# Patient Record
Sex: MALE | Race: OTHER | NOT HISPANIC OR LATINO | URBAN - METROPOLITAN AREA
[De-identification: names, ages, dates, MRNs, and addresses within clinical notes are randomized per-mention and may not be internally consistent; named-entity substitution may affect disease eponyms.]

---

## 2019-08-10 ENCOUNTER — INPATIENT (INPATIENT)
Facility: HOSPITAL | Age: 54
LOS: 10 days | Discharge: HOME | DRG: 247 | End: 2019-08-20
Attending: HOSPITALIST | Admitting: INTERNAL MEDICINE
Payer: MEDICAID

## 2019-08-10 VITALS
SYSTOLIC BLOOD PRESSURE: 154 MMHG | TEMPERATURE: 99 F | RESPIRATION RATE: 20 BRPM | OXYGEN SATURATION: 97 % | WEIGHT: 171.96 LBS | HEART RATE: 94 BPM | DIASTOLIC BLOOD PRESSURE: 104 MMHG

## 2019-08-10 DIAGNOSIS — I21.4 NON-ST ELEVATION (NSTEMI) MYOCARDIAL INFARCTION: ICD-10-CM
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
## 2019-08-10 LAB
ALBUMIN SERPL ELPH-MCNC: 3.5 G/DL — SIGNIFICANT CHANGE UP (ref 3.3–5)
ALBUMIN SERPL ELPH-MCNC: 3.6 G/DL — SIGNIFICANT CHANGE UP (ref 3.3–5)
ALBUMIN SERPL ELPH-MCNC: 3.7 G/DL — SIGNIFICANT CHANGE UP (ref 3.3–5)
ALP SERPL-CCNC: 55 U/L — SIGNIFICANT CHANGE UP (ref 40–120)
ALP SERPL-CCNC: 57 U/L — SIGNIFICANT CHANGE UP (ref 40–120)
ALP SERPL-CCNC: 59 U/L — SIGNIFICANT CHANGE UP (ref 40–120)
ALT FLD-CCNC: 182 U/L — HIGH (ref 10–45)
ALT FLD-CCNC: 87 U/L — HIGH (ref 10–45)
ALT FLD-CCNC: 98 U/L — HIGH (ref 10–45)
ANION GAP SERPL CALC-SCNC: 14 MMOL/L — SIGNIFICANT CHANGE UP (ref 5–17)
ANION GAP SERPL CALC-SCNC: 14 MMOL/L — SIGNIFICANT CHANGE UP (ref 5–17)
ANION GAP SERPL CALC-SCNC: 15 MMOL/L — SIGNIFICANT CHANGE UP (ref 5–17)
APTT BLD: 153.8 SEC — CRITICAL HIGH (ref 27.5–36.3)
AST SERPL-CCNC: 109 U/L — HIGH (ref 10–40)
AST SERPL-CCNC: 271 U/L — HIGH (ref 10–40)
AST SERPL-CCNC: 73 U/L — HIGH (ref 10–40)
BASOPHILS # BLD AUTO: 0 K/UL — SIGNIFICANT CHANGE UP (ref 0–0.2)
BILIRUB SERPL-MCNC: 0.2 MG/DL — SIGNIFICANT CHANGE UP (ref 0.2–1.2)
BILIRUB SERPL-MCNC: 0.3 MG/DL — SIGNIFICANT CHANGE UP (ref 0.2–1.2)
BILIRUB SERPL-MCNC: 0.3 MG/DL — SIGNIFICANT CHANGE UP (ref 0.2–1.2)
BLD GP AB SCN SERPL QL: NEGATIVE — SIGNIFICANT CHANGE UP
BUN SERPL-MCNC: 16 MG/DL — SIGNIFICANT CHANGE UP (ref 7–23)
BUN SERPL-MCNC: 17 MG/DL — SIGNIFICANT CHANGE UP (ref 7–23)
BUN SERPL-MCNC: 17 MG/DL — SIGNIFICANT CHANGE UP (ref 7–23)
CALCIUM SERPL-MCNC: 10 MG/DL — SIGNIFICANT CHANGE UP (ref 8.4–10.5)
CALCIUM SERPL-MCNC: 8.7 MG/DL — SIGNIFICANT CHANGE UP (ref 8.4–10.5)
CALCIUM SERPL-MCNC: 9.5 MG/DL — SIGNIFICANT CHANGE UP (ref 8.4–10.5)
CHLORIDE SERPL-SCNC: 100 MMOL/L — SIGNIFICANT CHANGE UP (ref 96–108)
CHLORIDE SERPL-SCNC: 102 MMOL/L — SIGNIFICANT CHANGE UP (ref 96–108)
CHLORIDE SERPL-SCNC: 103 MMOL/L — SIGNIFICANT CHANGE UP (ref 96–108)
CHOLEST SERPL-MCNC: 175 MG/DL — SIGNIFICANT CHANGE UP (ref 10–199)
CK MB BLD-MCNC: 10.1 % — HIGH (ref 0–3.5)
CK MB BLD-MCNC: 8.2 % — HIGH (ref 0–3.5)
CK MB CFR SERPL CALC: 14.1 NG/ML — HIGH (ref 0–6.7)
CK MB CFR SERPL CALC: 18.5 NG/ML — HIGH (ref 0–6.7)
CK SERPL-CCNC: 171 U/L — SIGNIFICANT CHANGE UP (ref 30–200)
CK SERPL-CCNC: 183 U/L — SIGNIFICANT CHANGE UP (ref 30–200)
CO2 SERPL-SCNC: 21 MMOL/L — LOW (ref 22–31)
CREAT SERPL-MCNC: 0.88 MG/DL — SIGNIFICANT CHANGE UP (ref 0.5–1.3)
CREAT SERPL-MCNC: 0.88 MG/DL — SIGNIFICANT CHANGE UP (ref 0.5–1.3)
CREAT SERPL-MCNC: 0.89 MG/DL — SIGNIFICANT CHANGE UP (ref 0.5–1.3)
EOSINOPHIL # BLD AUTO: 1.1 K/UL — HIGH (ref 0–0.5)
EOSINOPHIL NFR BLD AUTO: 7 % — HIGH (ref 0–6)
GLUCOSE SERPL-MCNC: 108 MG/DL — HIGH (ref 70–99)
GLUCOSE SERPL-MCNC: 112 MG/DL — HIGH (ref 70–99)
GLUCOSE SERPL-MCNC: 87 MG/DL — SIGNIFICANT CHANGE UP (ref 70–99)
HBA1C BLD-MCNC: 5.6 % — SIGNIFICANT CHANGE UP (ref 4–5.6)
HCT VFR BLD CALC: 36.5 % — LOW (ref 39–50)
HCT VFR BLD CALC: 38 % — LOW (ref 39–50)
HCT VFR BLD CALC: 38.6 % — LOW (ref 39–50)
HDLC SERPL-MCNC: 50 MG/DL — SIGNIFICANT CHANGE UP
HGB BLD-MCNC: 12.1 G/DL — LOW (ref 13–17)
HGB BLD-MCNC: 12.6 G/DL — LOW (ref 13–17)
HGB BLD-MCNC: 12.7 G/DL — LOW (ref 13–17)
INR BLD: 1.28 RATIO — HIGH (ref 0.88–1.16)
LIPID PNL WITH DIRECT LDL SERPL: 107 MG/DL — HIGH
LYMPHOCYTES # BLD AUTO: 10 % — LOW (ref 13–44)
LYMPHOCYTES # BLD AUTO: 2.6 K/UL — SIGNIFICANT CHANGE UP (ref 1–3.3)
MAGNESIUM SERPL-MCNC: 1.9 MG/DL — SIGNIFICANT CHANGE UP (ref 1.6–2.6)
MAGNESIUM SERPL-MCNC: 2.3 MG/DL — SIGNIFICANT CHANGE UP (ref 1.6–2.6)
MCHC RBC-ENTMCNC: 29.6 PG — SIGNIFICANT CHANGE UP (ref 27–34)
MCHC RBC-ENTMCNC: 30 PG — SIGNIFICANT CHANGE UP (ref 27–34)
MCHC RBC-ENTMCNC: 30.3 PG — SIGNIFICANT CHANGE UP (ref 27–34)
MCHC RBC-ENTMCNC: 32.5 GM/DL — SIGNIFICANT CHANGE UP (ref 32–36)
MCHC RBC-ENTMCNC: 33 GM/DL — SIGNIFICANT CHANGE UP (ref 32–36)
MCHC RBC-ENTMCNC: 33.3 GM/DL — SIGNIFICANT CHANGE UP (ref 32–36)
MCV RBC AUTO: 91 FL — SIGNIFICANT CHANGE UP (ref 80–100)
MCV RBC AUTO: 91 FL — SIGNIFICANT CHANGE UP (ref 80–100)
MCV RBC AUTO: 91.2 FL — SIGNIFICANT CHANGE UP (ref 80–100)
MONOCYTES # BLD AUTO: 2.4 K/UL — HIGH (ref 0–0.9)
MONOCYTES NFR BLD AUTO: 16 % — HIGH (ref 2–14)
NEUTROPHILS # BLD AUTO: 12.2 K/UL — HIGH (ref 1.8–7.4)
NEUTROPHILS NFR BLD AUTO: 67 % — SIGNIFICANT CHANGE UP (ref 43–77)
PHOSPHATE SERPL-MCNC: 3.3 MG/DL — SIGNIFICANT CHANGE UP (ref 2.5–4.5)
PLATELET # BLD AUTO: 247 K/UL — SIGNIFICANT CHANGE UP (ref 150–400)
PLATELET # BLD AUTO: 264 K/UL — SIGNIFICANT CHANGE UP (ref 150–400)
PLATELET # BLD AUTO: 271 K/UL — SIGNIFICANT CHANGE UP (ref 150–400)
POTASSIUM SERPL-MCNC: 3.7 MMOL/L — SIGNIFICANT CHANGE UP (ref 3.5–5.3)
POTASSIUM SERPL-MCNC: 3.8 MMOL/L — SIGNIFICANT CHANGE UP (ref 3.5–5.3)
POTASSIUM SERPL-MCNC: 4.5 MMOL/L — SIGNIFICANT CHANGE UP (ref 3.5–5.3)
POTASSIUM SERPL-SCNC: 3.7 MMOL/L — SIGNIFICANT CHANGE UP (ref 3.5–5.3)
POTASSIUM SERPL-SCNC: 3.8 MMOL/L — SIGNIFICANT CHANGE UP (ref 3.5–5.3)
POTASSIUM SERPL-SCNC: 4.5 MMOL/L — SIGNIFICANT CHANGE UP (ref 3.5–5.3)
PROT SERPL-MCNC: 6.5 G/DL — SIGNIFICANT CHANGE UP (ref 6–8.3)
PROT SERPL-MCNC: 6.6 G/DL — SIGNIFICANT CHANGE UP (ref 6–8.3)
PROT SERPL-MCNC: 6.9 G/DL — SIGNIFICANT CHANGE UP (ref 6–8.3)
PROTHROM AB SERPL-ACNC: 14.7 SEC — HIGH (ref 10–12.9)
RBC # BLD: 4.01 M/UL — LOW (ref 4.2–5.8)
RBC # BLD: 4.18 M/UL — LOW (ref 4.2–5.8)
RBC # BLD: 4.24 M/UL — SIGNIFICANT CHANGE UP (ref 4.2–5.8)
RBC # FLD: 12.1 % — SIGNIFICANT CHANGE UP (ref 10.3–14.5)
RBC # FLD: 12.2 % — SIGNIFICANT CHANGE UP (ref 10.3–14.5)
RBC # FLD: 12.3 % — SIGNIFICANT CHANGE UP (ref 10.3–14.5)
RH IG SCN BLD-IMP: POSITIVE — SIGNIFICANT CHANGE UP
SODIUM SERPL-SCNC: 135 MMOL/L — SIGNIFICANT CHANGE UP (ref 135–145)
SODIUM SERPL-SCNC: 138 MMOL/L — SIGNIFICANT CHANGE UP (ref 135–145)
SODIUM SERPL-SCNC: 138 MMOL/L — SIGNIFICANT CHANGE UP (ref 135–145)
TOTAL CHOLESTEROL/HDL RATIO MEASUREMENT: 3.5 RATIO — SIGNIFICANT CHANGE UP (ref 3.4–9.6)
TRIGL SERPL-MCNC: 88 MG/DL — SIGNIFICANT CHANGE UP (ref 10–149)
TROPONIN T, HIGH SENSITIVITY RESULT: 1176 NG/L — HIGH (ref 0–51)
TROPONIN T, HIGH SENSITIVITY RESULT: 506 NG/L — HIGH (ref 0–51)
TROPONIN T, HIGH SENSITIVITY RESULT: 756 NG/L — HIGH (ref 0–51)
TSH SERPL-MCNC: 5.66 UIU/ML — HIGH (ref 0.27–4.2)
WBC # BLD: 14.3 K/UL — HIGH (ref 3.8–10.5)
WBC # BLD: 14.7 K/UL — HIGH (ref 3.8–10.5)
WBC # BLD: 18.3 K/UL — HIGH (ref 3.8–10.5)
WBC # FLD AUTO: 14.3 K/UL — HIGH (ref 3.8–10.5)
WBC # FLD AUTO: 14.7 K/UL — HIGH (ref 3.8–10.5)
WBC # FLD AUTO: 18.3 K/UL — HIGH (ref 3.8–10.5)

## 2019-08-10 PROCEDURE — 99152 MOD SED SAME PHYS/QHP 5/>YRS: CPT

## 2019-08-10 PROCEDURE — 99291 CRITICAL CARE FIRST HOUR: CPT

## 2019-08-10 PROCEDURE — 93306 TTE W/DOPPLER COMPLETE: CPT | Mod: 26

## 2019-08-10 PROCEDURE — 92941 PRQ TRLML REVSC TOT OCCL AMI: CPT | Mod: LD

## 2019-08-10 PROCEDURE — 93458 L HRT ARTERY/VENTRICLE ANGIO: CPT | Mod: 26,59

## 2019-08-10 PROCEDURE — 93010 ELECTROCARDIOGRAM REPORT: CPT

## 2019-08-10 PROCEDURE — 93010 ELECTROCARDIOGRAM REPORT: CPT | Mod: 76

## 2019-08-10 RX ORDER — HYDRALAZINE HCL 50 MG
10 TABLET ORAL THREE TIMES A DAY
Refills: 0 | Status: DISCONTINUED | OUTPATIENT
Start: 2019-08-10 | End: 2019-08-10

## 2019-08-10 RX ORDER — MAGNESIUM SULFATE 500 MG/ML
1 VIAL (ML) INJECTION ONCE
Refills: 0 | Status: COMPLETED | OUTPATIENT
Start: 2019-08-10 | End: 2019-08-10

## 2019-08-10 RX ORDER — FENTANYL CITRATE 50 UG/ML
25 INJECTION INTRAVENOUS ONCE
Refills: 0 | Status: DISCONTINUED | OUTPATIENT
Start: 2019-08-10 | End: 2019-08-10

## 2019-08-10 RX ORDER — ATORVASTATIN CALCIUM 80 MG/1
80 TABLET, FILM COATED ORAL AT BEDTIME
Refills: 0 | Status: DISCONTINUED | OUTPATIENT
Start: 2019-08-10 | End: 2019-08-11

## 2019-08-10 RX ORDER — NITROGLYCERIN 6.5 MG
50 CAPSULE, EXTENDED RELEASE ORAL
Qty: 50 | Refills: 0 | Status: DISCONTINUED | OUTPATIENT
Start: 2019-08-10 | End: 2019-08-10

## 2019-08-10 RX ORDER — APIXABAN 2.5 MG/1
5 TABLET, FILM COATED ORAL
Refills: 0 | Status: DISCONTINUED | OUTPATIENT
Start: 2019-08-10 | End: 2019-08-12

## 2019-08-10 RX ORDER — METOPROLOL TARTRATE 50 MG
25 TABLET ORAL
Refills: 0 | Status: DISCONTINUED | OUTPATIENT
Start: 2019-08-10 | End: 2019-08-12

## 2019-08-10 RX ORDER — ACETAMINOPHEN 500 MG
650 TABLET ORAL EVERY 6 HOURS
Refills: 0 | Status: DISCONTINUED | OUTPATIENT
Start: 2019-08-10 | End: 2019-08-20

## 2019-08-10 RX ORDER — CHLORHEXIDINE GLUCONATE 213 G/1000ML
1 SOLUTION TOPICAL AT BEDTIME
Refills: 0 | Status: DISCONTINUED | OUTPATIENT
Start: 2019-08-10 | End: 2019-08-20

## 2019-08-10 RX ORDER — HYDRALAZINE HCL 50 MG
25 TABLET ORAL EVERY 8 HOURS
Refills: 0 | Status: DISCONTINUED | OUTPATIENT
Start: 2019-08-10 | End: 2019-08-12

## 2019-08-10 RX ORDER — ISOSORBIDE DINITRATE 5 MG/1
10 TABLET ORAL THREE TIMES A DAY
Refills: 0 | Status: DISCONTINUED | OUTPATIENT
Start: 2019-08-10 | End: 2019-08-11

## 2019-08-10 RX ORDER — TICAGRELOR 90 MG/1
90 TABLET ORAL EVERY 12 HOURS
Refills: 0 | Status: DISCONTINUED | OUTPATIENT
Start: 2019-08-10 | End: 2019-08-11

## 2019-08-10 RX ORDER — ACETAMINOPHEN 500 MG
650 TABLET ORAL ONCE
Refills: 0 | Status: COMPLETED | OUTPATIENT
Start: 2019-08-10 | End: 2019-08-10

## 2019-08-10 RX ORDER — POTASSIUM CHLORIDE 20 MEQ
40 PACKET (EA) ORAL ONCE
Refills: 0 | Status: COMPLETED | OUTPATIENT
Start: 2019-08-10 | End: 2019-08-10

## 2019-08-10 RX ORDER — ASPIRIN/CALCIUM CARB/MAGNESIUM 324 MG
81 TABLET ORAL DAILY
Refills: 0 | Status: DISCONTINUED | OUTPATIENT
Start: 2019-08-10 | End: 2019-08-20

## 2019-08-10 RX ORDER — HYDRALAZINE HCL 50 MG
10 TABLET ORAL ONCE
Refills: 0 | Status: COMPLETED | OUTPATIENT
Start: 2019-08-10 | End: 2019-08-10

## 2019-08-10 RX ORDER — HEPARIN SODIUM 5000 [USP'U]/ML
INJECTION INTRAVENOUS; SUBCUTANEOUS
Qty: 25000 | Refills: 0 | Status: DISCONTINUED | OUTPATIENT
Start: 2019-08-10 | End: 2019-08-10

## 2019-08-10 RX ORDER — CALAMINE AND ZINC OXIDE AND PHENOL 160; 10 MG/ML; MG/ML
1 LOTION TOPICAL THREE TIMES A DAY
Refills: 0 | Status: DISCONTINUED | OUTPATIENT
Start: 2019-08-10 | End: 2019-08-20

## 2019-08-10 RX ADMIN — HEPARIN SODIUM 950 UNIT(S)/HR: 5000 INJECTION INTRAVENOUS; SUBCUTANEOUS at 01:14

## 2019-08-10 RX ADMIN — Medication 15 MICROGRAM(S)/MIN: at 08:31

## 2019-08-10 RX ADMIN — Medication 1 APPLICATION(S): at 06:29

## 2019-08-10 RX ADMIN — Medication 15 MICROGRAM(S)/MIN: at 06:30

## 2019-08-10 RX ADMIN — Medication 81 MILLIGRAM(S): at 06:22

## 2019-08-10 RX ADMIN — Medication 25 MILLIGRAM(S): at 06:30

## 2019-08-10 RX ADMIN — ISOSORBIDE DINITRATE 10 MILLIGRAM(S): 5 TABLET ORAL at 21:35

## 2019-08-10 RX ADMIN — Medication 650 MILLIGRAM(S): at 06:05

## 2019-08-10 RX ADMIN — Medication 10 MILLIGRAM(S): at 09:30

## 2019-08-10 RX ADMIN — Medication 100 GRAM(S): at 08:31

## 2019-08-10 RX ADMIN — APIXABAN 5 MILLIGRAM(S): 2.5 TABLET, FILM COATED ORAL at 18:42

## 2019-08-10 RX ADMIN — Medication 40 MILLIEQUIVALENT(S): at 08:30

## 2019-08-10 RX ADMIN — Medication 10 MILLIGRAM(S): at 15:00

## 2019-08-10 RX ADMIN — FENTANYL CITRATE 25 MICROGRAM(S): 50 INJECTION INTRAVENOUS at 12:30

## 2019-08-10 RX ADMIN — Medication 15 MICROGRAM(S)/MIN: at 00:31

## 2019-08-10 RX ADMIN — CHLORHEXIDINE GLUCONATE 1 APPLICATION(S): 213 SOLUTION TOPICAL at 21:48

## 2019-08-10 RX ADMIN — Medication 25 MILLIGRAM(S): at 21:35

## 2019-08-10 RX ADMIN — FENTANYL CITRATE 25 MICROGRAM(S): 50 INJECTION INTRAVENOUS at 12:00

## 2019-08-10 RX ADMIN — FENTANYL CITRATE 25 MICROGRAM(S): 50 INJECTION INTRAVENOUS at 12:10

## 2019-08-10 RX ADMIN — TICAGRELOR 90 MILLIGRAM(S): 90 TABLET ORAL at 06:23

## 2019-08-10 RX ADMIN — Medication 650 MILLIGRAM(S): at 07:01

## 2019-08-10 RX ADMIN — ISOSORBIDE DINITRATE 10 MILLIGRAM(S): 5 TABLET ORAL at 13:20

## 2019-08-10 RX ADMIN — Medication 25 MILLIGRAM(S): at 18:42

## 2019-08-10 RX ADMIN — CALAMINE AND ZINC OXIDE AND PHENOL 1 APPLICATION(S): 160; 10 LOTION TOPICAL at 06:34

## 2019-08-10 RX ADMIN — Medication 1 APPLICATION(S): at 09:27

## 2019-08-10 RX ADMIN — ISOSORBIDE DINITRATE 10 MILLIGRAM(S): 5 TABLET ORAL at 08:30

## 2019-08-10 RX ADMIN — Medication 1 APPLICATION(S): at 18:43

## 2019-08-10 RX ADMIN — ATORVASTATIN CALCIUM 80 MILLIGRAM(S): 80 TABLET, FILM COATED ORAL at 21:35

## 2019-08-10 RX ADMIN — TICAGRELOR 90 MILLIGRAM(S): 90 TABLET ORAL at 18:42

## 2019-08-10 NOTE — ED PROVIDER NOTE - OBJECTIVE STATEMENT
54M no PMH presents as transfer from [facility] for NSTEMI 54M no PMH presents as transfer from [facility] for NSTEMI. Pt reports that while gardening today he developed sudden onset chest pain and shortness of breath.  He went to [facility] where EKG and trop were done. EKG with J point elevation in V1 V2 and pt found to have elevated troponin. Pt was given ASA, brillinta, started on heparin gtt and given nitro and transferred to [facility] for further cards eval. Pt reports that he was dx with poison ivy this week and has had a widespread rash. Upon arrival to [facility] pt reports being chest pain free without SOB; complaining of generalized throbbing frontal HA

## 2019-08-10 NOTE — ED PROVIDER NOTE - INTERPRETATION
EKG reviewed for rate, rhythm, axis, intervals and segments, including QRS morphology, P wave appearance T wave appearance, NM interval, and QT interval.  I find the EKG to be unremarkable in all of these regards except as follows: precordial j point elevation
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
## 2019-08-10 NOTE — CONSULT NOTE ADULT - ASSESSMENT
53 yo male, current smoker, has not seen a doctor in many years, presented to [place] with chest pain, noted to have elevated troponin and transferred to [facility].    - Admit to CCU for NSTEMI  - At outside hospital pt was loaded with ASA and Brilinta. c/w Heparin gtt.  - Check TTE  - Start Lipitor 80  - On nitro gtt for chest pain and hypertension  - Check TTE    Crow Chapa MD  Cardiology Fellow  [phone]  All Cardiology service information can be found 24/7 on [email or web address], password: [email or web address] 55 yo male, current smoker, has not seen a doctor in many years, presented to [place] with chest pain, noted to have elevated troponin and transferred to [facility].    - Admit to CCU for NSTEMI  - At outside hospital pt was loaded with ASA and Brilinta. c/w Heparin gtt.  - Check TTE  - Start Lipitor 80  - On nitro gtt for chest pain and hypertension  - At outside hospital had CTA which was negative for dissection. Did show lung nodule and recommended 12 month repeat CT chest.  - Check TTE    Crow Chapa MD  Cardiology Fellow  [phone]  All Cardiology service information can be found 24/7 on [email or web address], password: [email or web address]

## 2019-08-10 NOTE — H&P ADULT - HISTORY OF PRESENT ILLNESS
53 yo male, current smoker, has not seen a doctor in many years, presented to [place] with chest pain, noted to have elevated troponin and transferred to [facility]. Pt reports his symptoms began around noon while eating lunch. He reports 8/10 chest pain/pressure that radiated down his L arm and to his back. He felt short of breath during the episode. He went to the outside hospital where he received sublingual nitro and his symptoms improved. At this time his chest pain is 2-3/10 and he feels much improved. The pt denies any recent chest pain prior to today. No syncope, dyspnea, peripheral edema. The pt was diagnosed with Poison Ivy three days ago and was started yesterday on a Prednisone taper and Bactrim. Pt states he has not seen a doctor "in 30 yrs."    At OSH,  BP 170s/110s. Pt was started on heparin and nitro GTT, received ASA/plavix load, transferred to [facility] for further eval.    ED Course:  /104  HR 94  RR 20 T 98.6F 97%02  In the ED, pt was resumed on heparin and nitro GTT. Transferred to CCU for monitoring of NSTEMI. 55 yo male, current smoker, has not seen a doctor in many years, presented to [place] with chest pain, noted to have elevated troponin and transferred to [facility]. Pt reports his symptoms began around noon while eating lunch. He reports 8/10 chest pain/pressure that radiated down his L arm and to his back. He felt short of breath during the episode. He went to the outside hospital where he received sublingual nitro and his symptoms improved. At this time his chest pain is 2-3/10 and he feels much improved. The pt denies any recent chest pain prior to today. No syncope, dyspnea, peripheral edema.     Of note, pt was recently in the woods by a friends house 3d prior. States he had multiple mosquito bites all over his arm and exposure to bushes, does not know whether they looked like poison ivy but recalls that he was wearing a short-sleeved shirt and first noticed the rash on his b/l forearms. Afterwards, developed rash on his legs. The rash is itchy, although less now compared to before. Pt went to a doctor (?[facility]), where he was told he has poison ivy, was Rx'd a steroid taper (took 50mg x1 yesterday only), hydroxyzine PRN and bactrim. Has also been taking his friend's halobetasol which has helped.    At OSH,  BP 170s/110s. Pt was started on heparin and nitro GTT, received ASA/plavix load, transferred to [facility] for further eval.    ED Course:  /104  HR 94  RR 20 T 98.6F 97%02  In the ED, pt was resumed on heparin and nitro GTT. Transferred to CCU for monitoring of NSTEMI. 55 yo male, current smoker, has not seen a doctor in many years, presented to [place] with chest pain, noted to have elevated troponin and transferred to [facility]. Pt reports his symptoms began around noon while eating lunch. He reports 8/10 chest pain/pressure that radiated down his L arm and to his back. He felt short of breath during the episode. He went to the outside hospital where he received sublingual nitro and his symptoms improved. At this time his chest pain is 2-3/10 and he feels much improved. The pt denies any recent chest pain prior to today. No syncope, dyspnea, peripheral edema.     Of note, pt was recently in the woods/garden by a friends house 3d prior. States he had multiple mosquito bites all over his arm and exposure to bushes, does not know whether they looked like poison ivy but recalls that he was wearing a short-sleeved shirt and first noticed the rash on his b/l forearms. Afterwards, developed rash on his legs. The rash is itchy, although less now compared to before. Pt went to a doctor (?[facility]), where he was told he has poison ivy, was Rx'd a steroid taper (took 50mg x1 yesterday only), hydroxyzine PRN and bactrim. Has also been taking his friend's halobetasol which has helped.    At OSH,  /144. Pt was started on heparin and nitro GTT, received ASA/plavix load, transferred to [facility] for further eval. probnp 816. Trop I 1.420.    ED Course:  /104  HR 94  RR 20 T 98.6F 97%02  In the ED, pt was resumed on heparin and nitro GTT. Transferred to CCU for monitoring of NSTEMI.   Currently denies CP, SOB or palpitations. Has some headaches after starting nitro. 54M tobacco user w/ no known PMH (has not seen a doctor in many years) who presented to [place] with chest pain, noted to have elevated troponin and transferred to [facility]. Pt reports his symptoms began around noon while eating lunch. He reports 8/10 chest pain/pressure that radiated down his L arm and to his back. He felt short of breath during the episode. He went to the outside hospital where he received sublingual nitro and his symptoms improved. At this time his chest pain is 2-3/10 and he feels much improved. The pt denies any recent chest pain prior to today. No syncope, dyspnea, peripheral edema.     Of note, pt was recently in the woods/garden by a friends house 3d prior. States he had multiple mosquito bites all over his arm and exposure to bushes, does not know whether they looked like poison ivy but recalls that he was wearing a short-sleeved shirt and first noticed the rash on his b/l forearms. Afterwards, developed rash on his legs. The rash is itchy, although less now compared to before. Pt went to a doctor (?[facility]), where he was told he has poison ivy, was Rx'd a steroid taper (took 50mg x1 yesterday only), hydroxyzine PRN and bactrim. Has also been taking his friend's halobetasol which has helped.    At OSH,  /144. Pt was started on heparin and nitro GTT, received ASA/brilinta load, transferred to [facility] for further eval. probnp 816. Trop I 1.420.    ED Course:  /104  HR 94  RR 20 T 98.6F 97%02  In the ED, pt was resumed on heparin and nitro GTT. Transferred to CCU for monitoring of NSTEMI.   Currently denies CP, SOB or palpitations. Has some headaches after starting nitro.

## 2019-08-10 NOTE — H&P ADULT - NSHPSOCIALHISTORY_GEN_ALL_CORE
current smoker of 30 pack yrs -works as a ''  -drinks heavily per friends (about 3-4 100-150mL shots per day), has never had withdrawal symptoms, has gone 5d w/o drinking w/o symptoms  -family is in [place], only contacts are his close friends  -rents an apartment from someone. does not recall bed bugs at home  -uses tobacco but not cigarettes or chewing tobacco (per friend, keeps the tobacco under his tongue)  -denies rec drug use

## 2019-08-10 NOTE — PATIENT PROFILE ADULT - INTERPRETATION SERVICES DECLINED
Pt can make needs met in English Patient/Caregiver requests family/friend to interpret./Pt can make needs met in English

## 2019-08-10 NOTE — PROGRESS NOTE ADULT - SUBJECTIVE AND OBJECTIVE BOX
====================  CCU MIDNIGHT ROUNDS  ====================    CAMILLA STILL  [id number]  Patient is a 54y old  Male who presents with a chief complaint of NSTEMI (10 Aug 2019 02:09)      ====================  SUMMARY: 54M Bulgarian speaking current smoker, no known PMH 2/2 not seeing a doctor for many yrs who presented to [place] w/ chest +back pain, elevated BPs and elevated troponin, transferred to [facility] for mgmt of HTN emergency w/ NSTEMI, now s/p LHC 8/10 showing 100%occ to AD s/p RADHA x1.  ====================      ====================  NEW EVENTS: Hydralazine dose increased to 25mg TID from 10mg due to elevated BP.   ====================    MEDICATIONS  (STANDING):  apixaban 5 milliGRAM(s) Oral two times a day  aspirin  chewable 81 milliGRAM(s) Oral daily  atorvastatin 80 milliGRAM(s) Oral at bedtime  chlorhexidine 2% Cloths 1 Application(s) Topical at bedtime  clobetasol 0.05% Ointment 1 Application(s) Topical two times a day  hydrALAZINE 25 milliGRAM(s) Oral every 8 hours  isosorbide   dinitrate Tablet (ISORDIL) 10 milliGRAM(s) Oral three times a day  metoprolol tartrate 25 milliGRAM(s) Oral two times a day  ticagrelor 90 milliGRAM(s) Oral every 12 hours    MEDICATIONS  (PRN):  acetaminophen   Tablet .. 650 milliGRAM(s) Oral every 6 hours PRN Temp greater or equal to 38C (100.4F), Mild Pain (1 - 3), Moderate Pain (4 - 6), Severe Pain (7 - 10)  calamine Lotion 1 Application(s) Topical three times a day PRN Itching      ====================  VITALS (Last 12 hrs):  ====================    T(C): --  T(F): --  HR: 70 (08-10-19 @ 21:00) (66 - 94)  BP: 146/92 (08-10-19 @ 21:00) (118/75 - 164/106)  BP(mean): 119 (08-10-19 @ 21:00) (94 - 136)  ABP: 74/98 (08-10-19 @ 12:00) (74/98 - 148/94)  ABP(mean): 40 (08-10-19 @ 12:00) (40 - 112)  RR: 22 (08-10-19 @ 21:00) (17 - 36)  SpO2: 99% (08-10-19 @ 20:44) (97% - 99%)    I&O's Summary    09 Aug 2019 07:01  -  10 Aug 2019 07:00  --------------------------------------------------------  IN: 217.5 mL / OUT: 700 mL / NET: -482.5 mL    10 Aug 2019 07:01  -  10 Aug 2019 21:56  --------------------------------------------------------  IN: 425 mL / OUT: 2175 mL / NET: -1750 mL      ====================  NEW LABS:  ====================      08-10    135  |  100  |  17  ----------------------------<  87  4.5   |  21<L>  |  0.88    Ca    8.7      10 Aug 2019 12:45  Phos  3.3     08-10  Mg     2.3     08-10    TPro  6.5  /  Alb  3.6  /  TBili  0.3  /  DBili  x   /  AST  271<H>  /  ALT  182<H>  /  AlkPhos  57  08-10    PT/INR - ( 10 Aug 2019 06:20 )   PT: 14.7 sec;   INR: 1.28 ratio         PTT - ( 10 Aug 2019 06:20 )  PTT:153.8 sec  Creatine Kinase, Serum: 171 U/L (08-10-19 @ 12:45)  Creatine Kinase, Serum: 183 U/L (08-10-19 @ 06:20)  Creatine Kinase, Serum: 208 U/L <H> (08-10-19 @ 00:50)    CKMB Units: 14.1 ng/mL (08-10 @ 12:45)  CKMB Units: 18.5 ng/mL (08-10 @ 06:20)  CKMB Units: 18.6 ng/mL (08-10 @ 00:50)      ====================  PLAN:  ====================  #Cardiac  NSTEMI s/p LHC RADHA x1 mLAD  -c/w DAPT and high dose statin  -c/w lopressor 25mg BID  -increased hydralazine to 25mg TID  -c/w isosorbide 10mg TID  -Planned to transition to ACE/ARB tomorrow  -CE peaked    LV thrombus  -TTE 8/10 shows apical thrombus  -started eliquis 5mg BID    #DERM  Poison Ivy rash  -c/w calamine lotion for itchiness  -c/w temovate oinment  -f/u derm recs    Maria Esther Aggarwal CCU NP  Beeper #[id number]  Spectra # [phone]

## 2019-08-10 NOTE — ED PROVIDER NOTE - CLINICAL SUMMARY MEDICAL DECISION MAKING FREE TEXT BOX
see attending attestation authored by me, Bill Rivas MD see attending attestation authored by me, MD Salina Pang PGY4: 54M arrives as transfer for NSTEMI; no evidence of STEMI on EKG; s/p asa and brillinta; on hep gtt; will check trop, cxr, and admit per cardiology

## 2019-08-10 NOTE — ED PROVIDER NOTE - ATTENDING CONTRIBUTION TO CARE
55 yo male transfer from OSH for CP, reportedly elevated trop, dx with NSTEMI, started on heparin and nitro GTT, transferred here for further eval.  EKG here without STEMI, cards fellow at the bedside.  BP 170s/110s.  draw labs, resume heparin and nitro GTT, dispo per cardiology.  already got ASA and plavix load at OSH.

## 2019-08-10 NOTE — H&P ADULT - ASSESSMENT
54M German speaking current smoker, no known PMH 2/2 not seeing a doctor for many yrs who presented to [place] w/ chest +back pain, elevated BPs and elevated troponin, transferred to [facility] for mgmt of NSTEMI.      #Neuro  -mentating at baseline, A0 x 4    #Pulm  - satting well on RA  - CTA Chest at OSH unremarkable for PE but did show lung nodule  - pt will need 12 mth repeat CT chest as oupt    #CV  NSTEMI  Linn score:      ANTWAN score:  -EKG shows  -Found to have elevated troponins to 506, continue to trend  -CTA chest at OSH neg for acute dissection  -TTE in AM  -s/p asa, brilinta load at OSH  -c/w asa 81mg, brilinta 90 mg qd  -c/w hep gtt and nitro gtt  -monitor BPs  -start BB and ACE/ARB when tolerated    Hypertensive Emergency  -presented to OSH w/ BP 170s/110s w/ concurrent ACS  -now on nitro gtt  -monitor BPs; goal <160/110 over 24hrs    #GI  -Diet: DASH  -found to have transaminitis   -possibly 2/2 acute HTN emergency    #Derm  -pt w/ diffuse pruritic rash, c/w poison ivy  -will administer calamine lotion for symptom control    #Heme  -Hgb stable, no signs of acute bleed    #ID:  -leukocytosis to 18.3, afebrile. suspect 2/2 recent prednisone use.  -will continue to monitor     #DVT ppx:  -on hep gtt for NSTEMI      Franklyn Dickson  PGY2, Internal Medicine  CCU  pager [phone] 54M [place] speaking current smoker, no known PMH 2/2 not seeing a doctor for many yrs who presented to [place] w/ chest +back pain, elevated BPs and elevated troponin, transferred to [facility] for mgmt of NSTEMI.      #Neuro  -mentating at baseline, A0 x 4    #Pulm  - satting well on RA  - CTA Chest at OSH unremarkable for PE but did show lung nodule  - pt will need 12 mth repeat CT chest as oupt    #CV  NSTEMI  Linn score:      ANTWAN score:  -EKG shows  -Found to have elevated troponins to 506, continue to trend  -CTA chest at OSH neg for acute dissection  -TTE in AM  -s/p asa, brilinta load at OSH  -c/w asa 81mg, brilinta 90 mg qd  -start atorvastatin 80mg qhs  -c/w hep gtt and nitro gtt  -monitor BPs  -start BB and ACE/ARB when tolerated    Hypertensive Emergency  -presented to OSH w/ BP 170s/110s w/ concurrent ACS  -now on nitro gtt  -monitor BPs; goal <160/110 over 24hrs    #GI  -Diet: DASH  -found to have transaminitis   -possibly 2/2 acute HTN emergency    #Derm  -pt w/ diffuse pruritic rash, c/w poison ivy  -will administer calamine lotion for symptom control    #Heme  -Hgb stable, no signs of acute bleed    #ID:  -leukocytosis to 18.3, afebrile. suspect 2/2 recent prednisone use.  -will continue to monitor     #DVT ppx:  -on hep gtt for NSTEMI      Franklyn Dickson  PGY2, Internal Medicine  CCU  pager [phone] 54M [place] speaking current smoker, no known PMH 2/2 not seeing a doctor for many yrs who presented to [place] w/ chest +back pain, elevated BPs and elevated troponin, transferred to [facility] for mgmt of HTN emergency w/ NSTEMI.       #Neuro  -mentating at baseline, A0 x 4    #Pulm  - satting well on RA  - CTA Chest at OSH unremarkable for PE but did show lung nodule  - pt will need 12 mth repeat CT chest as oupt for f/u nodule    #CV  NSTEMI  Linn score:      ANTWAN score:  -Found to have elevated troponins to 506, continue to trend  -CTA chest at OSH neg for acute dissection  -TTE in AM  -s/p asa, brilinta load at OSH  -c/w asa 81mg, brilinta 90 mg qd  -start atorvastatin 80mg qhs  -c/w hep gtt and nitro gtt  -monitor BPs  -start BB and ACE/ARB when tolerated    Hypertensive Emergency  -presented to OSH w/ BP 170s/110s w/ concurrent ACS  -now on nitro gtt  -monitor BPs; goal <160/110 over 24hrs    #GI  -Diet: DASH  -found to have transaminitis   -possibly elevated in the setting of NSTEMI/HTN emergency  -will continue to trend  -no abd pain    #Derm  -pt w/ diffuse pruritic rash, c/w poison ivy  -will administer calamine lotion for symptom control  -consider topic corticosteroids if necessary, derm consult if unresolving    #Heme  -Hgb stable, no signs of acute bleed    #ID:  -leukocytosis to 18.3, afebrile. suspect 2/2 recent prednisone use.  -will continue to monitor     #DVT ppx:  -on hep gtt for NSTEMI      Franklyn Dickson  PGY2, Internal Medicine  CCU  pager [phone] 54M [place] speaking current smoker, no known PMH 2/2 not seeing a doctor for many yrs who presented to [place] w/ chest +back pain, elevated BPs and elevated troponin, transferred to [facility] for mgmt of HTN emergency w/ NSTEMI.       #Neuro  -mentating at baseline, A0 x 4    #Pulm  - satting well on RA  - CTA Chest at OSH unremarkable for PE but did show lung nodule  - pt will need 12 mth repeat CT chest as oupt for f/u nodule    #CV  NSTEMI  Linn score: 74 (1.8% prob of death < 6mths)  ANTWAN score: 2 (8% risk in 14d)  -Found to have elevated troponins to 506, continue to trend  -CTA chest at OSH neg for acute dissection  -TTE in AM  -s/p asa, brilinta load at OSH  -c/w asa 81mg, brilinta 90 mg qd  -start atorvastatin 80mg qhs  -c/w hep gtt and nitro gtt  -monitor BPs  -start BB and ACE/ARB when tolerated  -will need LHC at some pt, currently HD stable w/ controlled symptoms    Hypertensive Emergency  -presented to OSH w/ BP 170s/110s w/ concurrent ACS  -now on nitro gtt  -monitor BPs; goal <160/110 over 24hrs    #GI  -Diet: DASH  -found to have transaminitis   -possibly elevated in the setting of NSTEMI/HTN emergency  -will continue to trend  -no abd pain    #Derm  -pt w/ diffuse pruritic rash, c/w poison ivy  -will administer calamine lotion + topical corticosteroids for symptom control  -derm consult if unresolving    #Heme  -Hgb stable, no signs of acute bleed    #Endo  -HbA1c and lipid panel in AM  -no active issues    #ID:  -leukocytosis to 18.3, afebrile. suspect 2/2 recent prednisone use.  -will continue to monitor     #DVT ppx:  -on hep gtt for NSTEMI      Franklyn Dickson  PGY2, Internal Medicine  CCU  pager [phone] 54M Zimbabwean speaking current smoker, no known PMH 2/2 not seeing a doctor for many yrs who presented to [place] w/ chest +back pain, elevated BPs and elevated troponin, transferred to [facility] for mgmt of HTN emergency w/ NSTEMI.       #Neuro  -mentating at baseline, A0 x 4    #Pulm  - satting well on RA  - CTA Chest at OSH unremarkable for PE but did show lung nodule  - pt will need 12 mth repeat CT chest as oupt for f/u nodule    #CV  NSTEMI  Linn score: 74 (1.8% prob of death < 6mths)  ANTWAN score: 2 (8% risk in 14d)  -Found to have elevated troponins to 506, continue to trend  -CTA chest at OSH neg for acute dissection  -TTE in AM  -s/p asa, brilinta load at OSH  -c/w asa 81mg, brilinta 90 mg qd  -start atorvastatin 80mg qhs  -c/w hep gtt and nitro gtt  -monitor BPs  -start BB and ACE/ARB when tolerated  -will need [facility] (tentatively this wknd), currently HD stable w/ controlled symptoms    Hypertensive Emergency  -presented to OSH w/ BP 170s/110s w/ concurrent ACS  -now on nitro gtt  -monitor BPs; goal <160/110 over 24hrs from presentation    #GI  -Diet: DASH  -found to have transaminitis   -possibly elevated in the setting of NSTEMI/HTN emergency  -will continue to trend  -no abd pain    #Derm  -pt w/ diffuse pruritic weeping rash in the s/o of recent outing in the woods, c/w poison ivy. interdigital vesicles noted, though given history, lower susp for scabies justin  -will administer calamine lotion + topical corticosteroids for symptom control  -will hold off on bactrim as arm does not appear to be superinfected justin  -derm consult in AM for assistance with diagnosis and treatment    #Heme  -Hgb stable, no signs of acute bleed    #Endo  -HbA1c and lipid panel in AM  -no active issues    #ID:  -leukocytosis to 18.3, afebrile. suspect 2/2 recent prednisone use, lower susp for systemic infection  -blood cx's if febrile  -will continue to monitor     #DVT ppx:  -on hep gtt for NSTEMI      Franklyn Dickson  PGY2, Internal Medicine  CCU  pager [phone] 54M Montenegrin speaking current smoker, no known PMH 2/2 not seeing a doctor for many yrs who presented to [place] w/ chest +back pain, elevated BPs and elevated troponin, transferred to [facility] for mgmt of HTN emergency w/ NSTEMI.       #Neuro  -mentating at baseline, A0 x 4    #Pulm  - satting well on RA  - CTA Chest at OSH unremarkable for PE but did show lung nodule  - pt will need 12 mth repeat CT chest as oupt for f/u nodule    #CV  NSTEMI  Linn score: 74 (1.8% prob of death < 6mths)  ANTWAN score: 2 (8% risk in 14d)  -Found to have elevated troponins to 506, continue to trend  -CTA chest at OSH neg for acute dissection  -TTE in AM  -s/p asa, brilinta load at OSH  -c/w asa 81mg, brilinta 90 mg qd  -start atorvastatin 80mg qhs  -c/w hep gtt and nitro gtt  -monitor BPs  -start BB and ACE/ARB when tolerated  -will need [facility] (tentatively this wknd), currently HD stable w/ controlled symptoms    Hypertensive Emergency  -presented to OSH w/ BP 170s/110s w/ concurrent ACS  -now on nitro gtt  -monitor BPs; goal <160/110 over 24hrs from presentation    #GI  -Diet: DASH  -found to have transaminitis   -possibly elevated in the setting of NSTEMI/HTN emergency  -will continue to trend  -no abd pain    #Derm  -pt w/ diffuse pruritic weeping rash in the s/o of recent outing in the woods, c/w poison ivy. interdigital vesicles noted, though given history, lower susp for scabies justin  -will administer calamine lotion + topical corticosteroids for symptom control  -will hold off on bactrim as arm does not appear to be superinfected justin  -derm consult in AM for assistance with diagnosis and treatment    #Heme  -Hgb stable, no signs of acute bleed    #Endo  -HbA1c and lipid panel in AM  -no active issues    #Renal  -Cr 0.89  -continue to monitor  -replete lytes as necessary    #ID:  -leukocytosis to 18.3, afebrile. suspect 2/2 recent prednisone use, lower susp for systemic infection  -blood cx's if febrile  -will continue to monitor     #DVT ppx:  -on hep gtt for NSTEMI      Franklyn Dickson  PGY2, Internal Medicine  CCU  pager [phone] 54M Ukrainian speaking current smoker, no known PMH 2/2 not seeing a doctor for many yrs who presented to [place] w/ chest +back pain, elevated BPs and elevated troponin, transferred to [facility] for mgmt of HTN emergency w/ NSTEMI.       #Neuro  -mentating at baseline, A0 x 4    #Pulm  - satting well on RA  - CTA Chest at OSH unremarkable for PE but did show lung nodule  - pt will need 12 mth repeat CT chest as oupt for f/u nodule    #CV  NSTEMI  Linn score: 74 (1.8% prob of death < 6mths)  ANTWAN score: 2 (8% risk in 14d)  -now s/p LHC on 8/10 w/ 1 RADHA to pLAD via R fem  -currently HDS  -Found to have elevated troponins to 506, continue to trend  -CTA chest at OSH neg for acute dissection  -TTE in AM  -s/p asa, brilinta load at OSH  -c/w asa 81mg, brilinta 90 mg qd  -start atorvastatin 80mg qhs  -c/w hep gtt and nitro gtt  -monitor BPs  -start BB and ACE/ARB when tolerated    Hypertensive Emergency  -presented to OSH w/ BP 170s/110s w/ concurrent ACS  -now on nitro gtt  -monitor BPs; goal <160/110 over 24hrs from presentation    #GI  -Diet: DASH  -found to have transaminitis   -possibly elevated in the setting of NSTEMI/HTN emergency  -will continue to trend  -no abd pain    #Derm  -pt w/ diffuse pruritic weeping rash in the s/o of recent outing in the woods, c/w poison ivy. interdigital vesicles noted, though given history, lower susp for scabies justin  -will administer calamine lotion + topical corticosteroids for symptom control  -will hold off on bactrim as arm does not appear to be superinfected justin  -derm consult in AM for assistance with diagnosis and treatment    #Heme  -Hgb stable, no signs of acute bleed    #Endo  -HbA1c and lipid panel in AM  -no active issues    #Renal  -Cr 0.89  -continue to monitor  -replete lytes as necessary    #ID:  -leukocytosis to 18.3, afebrile. suspect 2/2 recent prednisone use, lower susp for systemic infection  -blood cx's if febrile  -will continue to monitor     #DVT ppx:  -on hep gtt for NSTEMI      Franklyn Claytonjordandelfin  PGY2, Internal Medicine  CCU  pager [phone] 54M Filipino speaking current smoker, no known PMH 2/2 not seeing a doctor for many yrs who presented to [place] w/ chest +back pain, elevated BPs and elevated troponin, transferred to [facility] for mgmt of HTN emergency w/ NSTEMI.       #Neuro  -mentating at baseline, A0 x 4    #Pulm  - satting well on RA  - CTA Chest at OSH unremarkable for PE but did show lung nodule  - pt will need 12 mth repeat CT chest as oupt for f/u nodule    #CV  NSTEMI  Linn score: 74 (1.8% prob of death < 6mths)  ANTWAN score: 2 (8% risk in 14d)  --now s/p LHC on 8/10 w/ 1 RADHA to pLAD via R fem  -currently HDS  -found to have elevated troponins to 506, continue to trend  -CTA chest at OSH neg for acute dissection  -TTE in AM  -s/p asa, brilinta load at OSH  -c/w asa 81mg, brilinta 90 mg qd  -start atorvastatin 80mg qhs  -started on metoprolol 25 BID  -s/p hep gtt  - c/w nitro gtt for HTN emergency as below  -monitor BPs  -start ACE/ARB when tolerated    Hypertensive Emergency  -presented to OSH w/ BP 170s/110s w/ concurrent ACS  -now on nitro gtt  -monitor BPs; goal <160/110 over 24hrs from presentation    #GI  -Diet: DASH  -found to have transaminitis   -possibly elevated in the setting of NSTEMI/HTN emergency  -will continue to trend  -no abd pain    #Derm  -pt w/ diffuse pruritic weeping rash in the s/o of recent outing in the woods, c/w poison ivy. interdigital vesicles noted, though given history, lower susp for scabies justin  -will administer calamine lotion + topical corticosteroids for symptom control  -will hold off on bactrim as arm does not appear to be superinfected justin  -derm consult in AM for assistance with diagnosis and treatment    #Heme  -Hgb stable, no signs of acute bleed    #Endo  -HbA1c and lipid panel in AM  -no active issues    #Renal  -Cr 0.89  -continue to monitor  -replete lytes as necessary    #ID:  -leukocytosis to 18.3, afebrile. suspect 2/2 recent prednisone use, lower susp for systemic infection  -blood cx's if febrile  -will continue to monitor     #DVT ppx:  -on hep gtt for NSTEMI      Franklyn Dickson  PGY2, Internal Medicine  CCU  pager [phone] 54M Maldivian speaking current smoker, no known PMH 2/2 not seeing a doctor for many yrs who presented to [place] w/ chest +back pain, elevated BPs and elevated troponin, transferred to [facility] for mgmt of HTN emergency w/ NSTEMI.       #Neuro  -mentating at baseline, A0 x 4    #Pulm  - satting well on RA  - CTA Chest at OSH unremarkable for PE but did show lung nodule  - pt will need 12 mth repeat CT chest as oupt for f/u nodule    #CV  NSTEMI  Linn score: 74 (1.8% prob of death < 6mths)  ANTWAN score: 2 (8% risk in 14d)  --now s/p LHC on 8/10 w/ 1 RADHA to pLAD via R fem  -currently HDS  -found to have elevated troponins to 506, continue to trend  -CTA chest at OSH neg for acute dissection  -TTE in AM  -s/p asa, brilinta load at OSH  -c/w asa 81mg, brilinta 90 mg qd  -start atorvastatin 80mg qhs  -started on metoprolol 25 BID  -s/p hep gtt  - c/w nitro gtt for HTN emergency as below  -monitor BPs  -start ACE/ARB when tolerated    Hypertensive Emergency  -presented to OSH w/ BP 170s/110s w/ concurrent ACS  -now on nitro gtt  -monitor BPs; goal <160/110 over 24hrs from presentation    #GI  -Diet: DASH  -found to have transaminitis   -possibly elevated in the setting of NSTEMI/HTN emergency  -will continue to trend  -no abd pain    #Derm  -pt w/ diffuse pruritic weeping rash in the s/o of recent outing in the woods, c/w poison ivy. interdigital vesicles noted, though given history, lower susp for scabies justin  -will administer calamine lotion + topical corticosteroids (clobetasol ointment BID) for symptom control  -holding oral prednisone given pt is on antiplatelets w/ recent hep gtt, would prefer to avoid prolongued healing of fem cath site  -will hold off on bactrim as arm does not appear to be superinfected justin  -derm consulted for assistance with diagnosis and treatment, will f/u recs    #Heme  -Hgb stable, no signs of acute bleed    #Endo  -HbA1c and lipid panel in AM  -no active issues    #Renal  -Cr 0.89  -continue to monitor  -replete lytes as necessary    #ID:  -leukocytosis to 18.3, afebrile. suspect 2/2 recent prednisone use, lower susp for systemic infection  -blood cx's if febrile  -will continue to monitor     #DVT ppx:  -on hep gtt for NSTEMI      Franklyn Dickson  PGY2, Internal Medicine  CCU  pager [phone] 54M Armenian speaking current smoker, no known PMH 2/2 not seeing a doctor for many yrs who presented to [place] w/ chest +back pain, elevated BPs and elevated troponin, transferred to [facility] for mgmt of HTN emergency w/ NSTEMI.       #Neuro  -mentating at baseline, A0 x 4    #Pulm  - satting well on RA  - CTA Chest at OSH unremarkable for PE but did show lung nodule  - pt will need 12 mth repeat CT chest as oupt for f/u nodule    #CV  NSTEMI  Linn score: 74 (1.8% prob of death < 6mths)  ANTWAN score: 2 (8% risk in 14d)  --now s/p LHC on 8/10 w/ 1 RADHA to pLAD via R fem  -currently HDS  -found to have elevated troponins to 506, continue to trend  -CTA chest at OSH neg for acute dissection  -TTE in AM  -s/p asa, brilinta load at OSH  -c/w asa 81mg, brilinta 90 mg qd  -start atorvastatin 80mg qhs  -started on metoprolol 25 BID  -s/p hep gtt  - c/w nitro gtt for HTN emergency as below  -monitor BPs  -start ACE/ARB when tolerated (pt just had 2 contrast    Hypertensive Emergency  -presented to OSH w/ BP 170s/110s w/ concurrent ACS  -now on nitro gtt, plan to wean as BPs allow  -started on isordil 10 TID  -plan to start ACEi   -monitor BPs; goal <160/110 over 24hrs from presentation    #GI  -Diet: DASH  -found to have transaminitis   -possibly elevated in the setting of NSTEMI/HTN emergency  -will continue to trend  -no abd pain    #Derm  -pt w/ diffuse pruritic weeping rash in the s/o of recent outing in the woods, c/w poison ivy. interdigital vesicles noted, though given history, lower susp for scabies justin  -will administer calamine lotion + topical corticosteroids (clobetasol ointment BID) for symptom control  -holding oral prednisone given pt is on antiplatelets w/ recent hep gtt, would prefer to avoid prolongued healing of fem cath site  -will hold off on bactrim as arm does not appear to be superinfected justin  -derm consulted for assistance with diagnosis and treatment, will f/u recs    #Heme  -Hgb stable, no signs of acute bleed    #Endo  -HbA1c and lipid panel in AM  -no active issues    #Renal  -Cr 0.89  -continue to monitor  -replete lytes as necessary    #ID:  -leukocytosis to 18.3, afebrile. suspect 2/2 recent prednisone use, lower susp for systemic infection  -blood cx's if febrile  -will continue to monitor     #DVT ppx:  -on hep gtt for NSTEMI      Franklyn Dickson  PGY2, Internal Medicine  CCU  pager [phone] 54M Barbadian speaking current smoker, no known PMH 2/2 not seeing a doctor for many yrs who presented to [place] w/ chest +back pain, elevated BPs and elevated troponin, transferred to [facility] for mgmt of HTN emergency w/ NSTEMI.       #Neuro  -mentating at baseline, A0 x 4    #Pulm  - satting well on RA  - CTA Chest at OSH unremarkable for PE but did show lung nodule  - pt will need 12 mth repeat CT chest as oupt for f/u nodule    #CV  NSTEMI  Linn score: 74 (1.8% prob of death < 6mths)  ANTWAN score: 2 (8% risk in 14d)  --now s/p LHC on 8/10 w/ 1 RADHA to pLAD via R fem  -currently HDS  -found to have elevated troponins to 506, continue to trend  -CTA chest at OSH neg for acute dissection  -TTE in AM  -s/p asa, brilinta load at OSH, hep gtt  -c/w asa 81mg, brilinta 90 mg qd  -start atorvastatin 80mg qhs  -started on metoprolol 25 BID  - c/w nitro gtt for HTN emergency as below  -monitor BPs  -start ACE/ARB when tolerated (pt just had 2 contrast procedures, will wait 24hrs prior to starting)    Hypertensive Emergency  -presented to OSH w/ BP 170s/110s w/ concurrent ACS  -now on nitro gtt, plan to wean as BPs allow  -started on isordil 10 TID  -plan to start ACEi   -monitor BPs; goal <160/110 over 24hrs from presentation    #GI  -Diet: DASH  -found to have transaminitis   -possibly elevated in the setting of NSTEMI/HTN emergency  -will continue to trend  -no abd pain    #Derm  -pt w/ diffuse pruritic weeping rash in the s/o of recent outing in the woods, c/w poison ivy. interdigital vesicles noted, though given history, lower susp for scabies justin  -will administer calamine lotion + topical corticosteroids (clobetasol ointment BID) for symptom control  -holding oral prednisone given pt is on antiplatelets w/ recent hep gtt, would prefer to avoid prolongued healing of fem cath site  -will hold off on bactrim as arm does not appear to be superinfected justin  -derm consulted for assistance with diagnosis and treatment, will f/u recs    #Heme  -Hgb stable, no signs of acute bleed    #Endo  -HbA1c and lipid panel in AM  -no active issues    #Renal  -Cr 0.89  -continue to monitor  -replete lytes as necessary    #ID:  -leukocytosis to 18.3, afebrile. suspect 2/2 recent prednisone use, lower susp for systemic infection  -blood cx's if febrile  -will continue to monitor     #DVT ppx:  -on hep gtt for NSTEMI      Franklyn Dickson  PGY2, Internal Medicine  CCU  pager [phone] 54M Kittitian speaking current smoker, no known PMH 2/2 not seeing a doctor for many yrs who presented to [place] w/ chest +back pain, elevated BPs and elevated troponin, transferred to [facility] for mgmt of HTN emergency w/ NSTEMI, now s/p LHC 8/10 showing 100%occ to mLAD s/p RADHA x1.      #Neuro  -mentating at baseline, A0 x 4    #Pulm  - satting well on RA  - CTA Chest at OSH unremarkable for PE but did show lung nodule  - pt will need 12 mth repeat CT chest as oupt for f/u nodule    #CV  NSTEMI  Linn score: 74 (1.8% prob of death < 6mths)  ANTWAN score: 2 (8% risk in 14d)  --now s/p LHC on 8/10 showing 100% occlusion to mLAD (thought to be chronic) w/ 1 RADHA to mLAD via R fem  -R fem sheath to be pulled at 7:30am, currently CDI  -currently HDS  -found to have elevated troponins to 506 -> 1176  -CK peaked at 208 ->183, will no longer trend  -CTA chest at OSH neg for acute dissection  -TTE in AM  -s/p asa, brilinta load at OSH, hep gtt  -c/w asa 81mg, brilinta 90 mg qd  -start atorvastatin 80mg qhs  -started on metoprolol 25 BID  - c/w nitro gtt for HTN emergency as below  -monitor BPs  -start ACE/ARB when tolerated (pt just had 2 contrast procedures, will wait 24hrs prior to starting)    Hypertensive Emergency  -presented to OSH w/ BP 170s/110s w/ concurrent ACS  -now on nitro gtt, plan to wean as BPs allow  -started on isordil 10 TID  -plan to start ACEi   -monitor BPs; goal <160/110 over 24hrs from presentation    #GI  -Diet: DASH  -found to have transaminitis   -possibly elevated in the setting of NSTEMI/HTN emergency  -will continue to trend  -no abd pain    #Derm  -pt w/ diffuse pruritic weeping rash in the s/o of recent outing in the woods, c/w poison ivy. interdigital vesicles noted, though given history, lower susp for scabies justin  -will administer calamine lotion + topical corticosteroids (clobetasol ointment BID) for symptom control  -holding oral prednisone given pt is on antiplatelets w/ recent hep gtt, would prefer to avoid prolongued healing of fem cath site  -will hold off on bactrim as arm does not appear to be superinfected justin  -derm consulted for assistance with diagnosis and treatment, will f/u recs    #Heme  -Hgb stable, no signs of acute bleed    #Endo  -HbA1c and lipid panel in AM  -no active issues    #Renal  -Cr 0.89  -continue to monitor  -replete lytes as necessary    #ID:  -leukocytosis to 18.3, afebrile. suspect 2/2 recent prednisone use, lower susp for systemic infection  -blood cx's if febrile  -will continue to monitor     #DVT ppx:  -on hep gtt for NSTEMI      Franklyn Dickson  PGY2, Internal Medicine  CCU  pager [phone]

## 2019-08-10 NOTE — ED ADULT NURSE NOTE - OBJECTIVE STATEMENT
54 year old male presents to the ED via EMS as transfer from OSH for NSTEMI. Pt is currently on heparin gtt infusing @ 9.5cc/hr and nitro gtt @ 15cc/hr.  As per MD Rivas and MD Downing, continue heparin gtt at current stated weight on transfer paperwork and repeat PTT labs @ 0436 - 6 hours from start of infusion at prior hospital at 2236. Patient hypertensive upon arrival to ED with no c/o chest pain at this time, cardiology at bedside for eval. Pt is also avid  and is currently covered in poison ivy. Denies itchiness or pain. No c/o discomfort at this time. Comfort and safety measures maintained.

## 2019-08-10 NOTE — H&P ADULT - NSHPLABSRESULTS_GEN_ALL_CORE
12.7   18.3  )-----------( 271      ( 10 Aug 2019 00:50 )             38.0       08-10    138  |  103  |  17  ----------------------------<  112<H>  3.8   |  21<L>  |  0.89    Ca    10.0      10 Aug 2019 00:50    TPro  6.9  /  Alb  3.7  /  TBili  0.2  /  DBili  x   /  AST  73<H>  /  ALT  87<H>  /  AlkPhos  59  08-10              Lactate Trend      CAPILLARY BLOOD GLUCOSE      Cultures: none    Radiology:  none    EKG: TELEMETRY: 	    ECG:  	Sinus HR 89 poor R wave progression 12.7   18.3  )-----------( 271      ( 10 Aug 2019 00:50 )             38.0       08-10    138  |  103  |  17  ----------------------------<  112<H>  3.8   |  21<L>  |  0.89    Ca    10.0      10 Aug 2019 00:50    TPro  6.9  /  Alb  3.7  /  TBili  0.2  /  DBili  x   /  AST  73<H>  /  ALT  87<H>  /  AlkPhos  59  08-10              Lactate Trend      CAPILLARY BLOOD GLUCOSE      Cultures: none    Radiology:  none    EKG: TELEMETRY: 	    ECG:  Sinus HR 89 poor R wave progression 12.7   18.3  )-----------( 271      ( 10 Aug 2019 00:50 )             38.0       08-10    138  |  103  |  17  ----------------------------<  112<H>  3.8   |  21<L>  |  0.89    Ca    10.0      10 Aug 2019 00:50    TPro  6.9  /  Alb  3.7  /  TBili  0.2  /  DBili  x   /  AST  73<H>  /  ALT  87<H>  /  AlkPhos  59  08-10              Lactate Trend      CAPILLARY BLOOD GLUCOSE      Cultures: none    Radiology:  none    EKG: TELEMETRY: 	    ECG:  Sinus HR 89 poor R wave progression. ?Qwave in V2

## 2019-08-10 NOTE — H&P ADULT - NSHPREVIEWOFSYSTEMS_GEN_ALL_CORE
REVIEW OF SYSTEMS:    CONSTITUTIONAL: No weakness, fevers or chills  EYES/ENT: No visual changes;  No vertigo or throat pain   NECK: No pain or stiffness  RESPIRATORY: No cough, wheezing, hemoptysis; No shortness of breath  CARDIOVASCULAR: No chest pain or palpitations  GASTROINTESTINAL: No abdominal or epigastric pain. No nausea, vomiting, or hematemesis; No diarrhea or constipation. No melena or hematochezia.  GENITOURINARY: No dysuria, frequency or hematuria  NEUROLOGICAL: No numbness or weakness  SKIN: No itching, rashes  All other negative REVIEW OF SYSTEMS:    CONSTITUTIONAL: No weakness, fevers or chills. +HAs  EYES/ENT: No visual changes;  No vertigo or throat pain   NECK: No pain or stiffness  RESPIRATORY: No cough, wheezing, hemoptysis; No shortness of breath  CARDIOVASCULAR: No chest pain or palpitations  GASTROINTESTINAL: No abdominal or epigastric pain. No nausea, vomiting, or hematemesis; No diarrhea or constipation. No melena or hematochezia.  GENITOURINARY: No dysuria, frequency or hematuria  NEUROLOGICAL: No numbness or weakness  SKIN: No itching, rashes  All other negative

## 2019-08-10 NOTE — ED PROVIDER NOTE - PROGRESS NOTE DETAILS
[place] PGY4: trop 504; cardiology notified; will admit to CCU; pt remains CP free continues to c/o mild frontal HA
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
## 2019-08-10 NOTE — H&P ADULT - ATTENDING COMMENTS
Patient presents with anterior wall MI, NSTEMI now status post PCI to LAD.  TTE with LV thrombus.  Continue dual antiplatelet therapy, high intensity statin, beta-blocker and ARB as tolerated.  For LV thrombus, patient will require anticoagulation.    I have personally provided 30 minutes of critical care time concurrently with resident/fellow and excludes time spent on separate procedures.  I have reviewed the resident and fellow's documentation and I agree with the resident and fellow's assessments and plans of care.

## 2019-08-11 LAB
ALBUMIN SERPL ELPH-MCNC: 3.9 G/DL — SIGNIFICANT CHANGE UP (ref 3.3–5)
ALP SERPL-CCNC: 71 U/L — SIGNIFICANT CHANGE UP (ref 40–120)
ALT FLD-CCNC: 250 U/L — HIGH (ref 10–45)
ANION GAP SERPL CALC-SCNC: 15 MMOL/L — SIGNIFICANT CHANGE UP (ref 5–17)
APTT BLD: 27.2 SEC — LOW (ref 27.5–36.3)
AST SERPL-CCNC: 270 U/L — HIGH (ref 10–40)
BILIRUB SERPL-MCNC: 0.4 MG/DL — SIGNIFICANT CHANGE UP (ref 0.2–1.2)
BUN SERPL-MCNC: 17 MG/DL — SIGNIFICANT CHANGE UP (ref 7–23)
CALCIUM SERPL-MCNC: 9.2 MG/DL — SIGNIFICANT CHANGE UP (ref 8.4–10.5)
CHLORIDE SERPL-SCNC: 99 MMOL/L — SIGNIFICANT CHANGE UP (ref 96–108)
CO2 SERPL-SCNC: 20 MMOL/L — LOW (ref 22–31)
CREAT SERPL-MCNC: 0.89 MG/DL — SIGNIFICANT CHANGE UP (ref 0.5–1.3)
GLUCOSE SERPL-MCNC: 106 MG/DL — HIGH (ref 70–99)
HCT VFR BLD CALC: 42 % — SIGNIFICANT CHANGE UP (ref 39–50)
HGB BLD-MCNC: 14 G/DL — SIGNIFICANT CHANGE UP (ref 13–17)
INR BLD: 1.2 RATIO — HIGH (ref 0.88–1.16)
MAGNESIUM SERPL-MCNC: 2.3 MG/DL — SIGNIFICANT CHANGE UP (ref 1.6–2.6)
MCHC RBC-ENTMCNC: 30.2 PG — SIGNIFICANT CHANGE UP (ref 27–34)
MCHC RBC-ENTMCNC: 33.3 GM/DL — SIGNIFICANT CHANGE UP (ref 32–36)
MCV RBC AUTO: 90.9 FL — SIGNIFICANT CHANGE UP (ref 80–100)
PHOSPHATE SERPL-MCNC: 4.4 MG/DL — SIGNIFICANT CHANGE UP (ref 2.5–4.5)
PLATELET # BLD AUTO: 254 K/UL — SIGNIFICANT CHANGE UP (ref 150–400)
POTASSIUM SERPL-MCNC: 4.7 MMOL/L — SIGNIFICANT CHANGE UP (ref 3.5–5.3)
POTASSIUM SERPL-SCNC: 4.7 MMOL/L — SIGNIFICANT CHANGE UP (ref 3.5–5.3)
PROT SERPL-MCNC: 7.7 G/DL — SIGNIFICANT CHANGE UP (ref 6–8.3)
PROTHROM AB SERPL-ACNC: 13.9 SEC — HIGH (ref 10–12.9)
RBC # BLD: 4.63 M/UL — SIGNIFICANT CHANGE UP (ref 4.2–5.8)
RBC # FLD: 12.3 % — SIGNIFICANT CHANGE UP (ref 10.3–14.5)
SODIUM SERPL-SCNC: 134 MMOL/L — LOW (ref 135–145)
WBC # BLD: 12 K/UL — HIGH (ref 3.8–10.5)
WBC # FLD AUTO: 12 K/UL — HIGH (ref 3.8–10.5)

## 2019-08-11 PROCEDURE — 99291 CRITICAL CARE FIRST HOUR: CPT

## 2019-08-11 PROCEDURE — 93010 ELECTROCARDIOGRAM REPORT: CPT

## 2019-08-11 RX ORDER — LISINOPRIL 2.5 MG/1
5 TABLET ORAL DAILY
Refills: 0 | Status: DISCONTINUED | OUTPATIENT
Start: 2019-08-11 | End: 2019-08-12

## 2019-08-11 RX ORDER — ATORVASTATIN CALCIUM 80 MG/1
40 TABLET, FILM COATED ORAL AT BEDTIME
Refills: 0 | Status: DISCONTINUED | OUTPATIENT
Start: 2019-08-11 | End: 2019-08-13

## 2019-08-11 RX ORDER — CLOPIDOGREL BISULFATE 75 MG/1
75 TABLET, FILM COATED ORAL DAILY
Refills: 0 | Status: DISCONTINUED | OUTPATIENT
Start: 2019-08-12 | End: 2019-08-20

## 2019-08-11 RX ORDER — CLOPIDOGREL BISULFATE 75 MG/1
300 TABLET, FILM COATED ORAL ONCE
Refills: 0 | Status: COMPLETED | OUTPATIENT
Start: 2019-08-11 | End: 2019-08-11

## 2019-08-11 RX ORDER — LISINOPRIL 2.5 MG/1
2.5 TABLET ORAL DAILY
Refills: 0 | Status: DISCONTINUED | OUTPATIENT
Start: 2019-08-11 | End: 2019-08-11

## 2019-08-11 RX ADMIN — CLOPIDOGREL BISULFATE 300 MILLIGRAM(S): 75 TABLET, FILM COATED ORAL at 05:18

## 2019-08-11 RX ADMIN — APIXABAN 5 MILLIGRAM(S): 2.5 TABLET, FILM COATED ORAL at 17:46

## 2019-08-11 RX ADMIN — ISOSORBIDE DINITRATE 10 MILLIGRAM(S): 5 TABLET ORAL at 13:01

## 2019-08-11 RX ADMIN — Medication 25 MILLIGRAM(S): at 20:42

## 2019-08-11 RX ADMIN — Medication 1 APPLICATION(S): at 18:19

## 2019-08-11 RX ADMIN — Medication 25 MILLIGRAM(S): at 21:01

## 2019-08-11 RX ADMIN — Medication 25 MILLIGRAM(S): at 15:02

## 2019-08-11 RX ADMIN — CHLORHEXIDINE GLUCONATE 1 APPLICATION(S): 213 SOLUTION TOPICAL at 21:02

## 2019-08-11 RX ADMIN — Medication 81 MILLIGRAM(S): at 13:01

## 2019-08-11 RX ADMIN — ISOSORBIDE DINITRATE 10 MILLIGRAM(S): 5 TABLET ORAL at 05:19

## 2019-08-11 RX ADMIN — Medication 650 MILLIGRAM(S): at 16:34

## 2019-08-11 RX ADMIN — Medication 25 MILLIGRAM(S): at 05:18

## 2019-08-11 RX ADMIN — LISINOPRIL 2.5 MILLIGRAM(S): 2.5 TABLET ORAL at 05:18

## 2019-08-11 RX ADMIN — APIXABAN 5 MILLIGRAM(S): 2.5 TABLET, FILM COATED ORAL at 05:19

## 2019-08-11 RX ADMIN — ATORVASTATIN CALCIUM 40 MILLIGRAM(S): 80 TABLET, FILM COATED ORAL at 21:01

## 2019-08-11 RX ADMIN — Medication 40 MILLIGRAM(S): at 20:41

## 2019-08-11 RX ADMIN — Medication 1 APPLICATION(S): at 05:19

## 2019-08-11 RX ADMIN — Medication 650 MILLIGRAM(S): at 17:00

## 2019-08-11 RX ADMIN — Medication 25 MILLIGRAM(S): at 07:58

## 2019-08-11 NOTE — CHART NOTE - NSCHARTNOTEFT_GEN_A_CORE
Dermatology Brief Note    HPI:  55 yo M admitted for NSTEMI now s/p cath. Dermatology consulted for itchy rash on the arms, right leg, abdomen. Had exposure to plants through gardening. Very itchy. Rash x several days. Was on prednisone as an outpatient but was stopped in the hospital. Improving with use of halobetasol topically.     PHYSICAL EXAM:  Vital Signs Last 24 Hrs  T(C): 36.3 (11 Aug 2019 11:00), Max: 36.3 (11 Aug 2019 08:00)  T(F): 97.4 (11 Aug 2019 11:00), Max: 97.4 (11 Aug 2019 11:00)  HR: 66 (11 Aug 2019 15:00) (58 - 98)  BP: 111/67 (11 Aug 2019 15:00) (100/56 - 168/87)  BP(mean): 88 (11 Aug 2019 15:00) (71 - 136)  RR: 20 (11 Aug 2019 15:00) (16 - 34)  SpO2: 97% (11 Aug 2019 15:00) (96% - 99%)    Skin exam notable for:  The patient was alert and oriented X 3, well nourished, and in no apparent distress. Oropharynx showed no ulcerations. There was no visible lymphadenopathy. Conjunctiva were non-injected. There was no clubbing or edema of extremities.    The scalp, hair, face, eyebrows, lips, oropharynx , neck, chest, back, buttocks, extremities X 4, hands, feet, nails were examined. There was no hyperhidrosis or bromhidrosis.     The following lesions are noted:   pink vesiculopapules on the bilateral hands forearms with purpuric changes and linear excoriations    the abdomen with pink eczematous plaque and papules     the right leg with eczematous plaque      ASSESSMENT/PLAN:    #Contact Dermatitis- poison ivy. Anticoagulation is making scratched and traumatized areas appear purpuric. Rash can continue to spread up to 3 weeks after exposure     -Clobetasol ointment BID to the AA, not to apply in the underarms or groin or face  -Start prednisone taper- 40mg for 5 days, 30mg for 5 days, 20mg for 5 days, 10mg for 5 days then STOP    Discussed with primary team.  Discussed with attending, Dr. Thomson. Formal rounds to be conducted on 8/12. Will update assessment and plan at that time.    Carri Murphy MD  PGY-3, Dermatology

## 2019-08-11 NOTE — PROGRESS NOTE ADULT - SUBJECTIVE AND OBJECTIVE BOX
PATIENT:  CAMILLA STILL  [id number]    CHIEF COMPLAINT:  Patient is a 54y old  Male who presents with a chief complaint of NSTEMI (10 Aug 2019 21:56)      INTERVAL HISTORYOVERNIGHT EVENTS: off nitro gtt, on PO metoprolol/hydral/isordil      REVIEW OF SYSTEMS:    Constitutional:     [ ] negative [ ] fevers [ ] chills [ ] weight loss [ ] weight gain  HEENT:                  [ ] negative [ ] dry eyes [ ] eye irritation [ ] postnasal drip [ ] nasal congestion  CV:                         [ ] negative  [ ] chest pain [ ] orthopnea [ ] palpitations [ ] murmur  Resp:                     [ ] negative [ ] cough [ ] shortness of breath [ ] dyspnea [ ] wheezing [ ] sputum [ ] hemoptysis  GI:                          [ ] negative [ ] nausea [ ] vomiting [ ] diarrhea [ ] constipation [ ] abd pain [ ] dysphagia   :                        [ ] negative [ ] dysuria [ ] nocturia [ ] hematuria [ ] increased urinary frequency  Musculoskeletal: [ ] negative [ ] back pain [ ] myalgias [ ] arthralgias [ ] fracture  Skin:                       [ ] negative [ ] rash [ ] itch  Neurological:        [ ] negative [ ] headache [ ] dizziness [ ] syncope [ ] weakness [ ] numbness  Psychiatric:           [ ] negative [ ] anxiety [ ] depression  Endocrine:            [ ] negative [ ] diabetes [ ] thyroid problem  Heme/Lymph:      [ ] negative [ ] anemia [ ] bleeding problem  Allergic/Immune: [ ] negative [ ] itchy eyes [ ] nasal discharge [ ] hives [ ] angioedema    [ ] All other systems negative  [ ] Unable to assess ROS because ________.    MEDICATIONS:  MEDICATIONS  (STANDING):  apixaban 5 milliGRAM(s) Oral two times a day  aspirin  chewable 81 milliGRAM(s) Oral daily  atorvastatin 80 milliGRAM(s) Oral at bedtime  chlorhexidine 2% Cloths 1 Application(s) Topical at bedtime  clobetasol 0.05% Ointment 1 Application(s) Topical two times a day  hydrALAZINE 25 milliGRAM(s) Oral every 8 hours  isosorbide   dinitrate Tablet (ISORDIL) 10 milliGRAM(s) Oral three times a day  lisinopril 2.5 milliGRAM(s) Oral daily  metoprolol tartrate 25 milliGRAM(s) Oral two times a day    MEDICATIONS  (PRN):  acetaminophen   Tablet .. 650 milliGRAM(s) Oral every 6 hours PRN Temp greater or equal to 38C (100.4F), Mild Pain (1 - 3), Moderate Pain (4 - 6), Severe Pain (7 - 10)  calamine Lotion 1 Application(s) Topical three times a day PRN Itching      ALLERGIES:  Allergies    No Known Allergies    Intolerances        OBJECTIVE:  ICU Vital Signs Last 24 Hrs  T(C): --  T(F): --  HR: 98 (11 Aug 2019 05:00) (64 - 98)  BP: 119/84 (11 Aug 2019 05:00) (118/75 - 168/87)  BP(mean): 92 (11 Aug 2019 05:00) (92 - 136)  ABP: 74/98 (10 Aug 2019 12:00) (74/98 - 164/106)  ABP(mean): 40 (10 Aug 2019 12:00) (40 - 124)  RR: 34 (11 Aug 2019 05:00) (16 - 38)  SpO2: 99% (10 Aug 2019 20:44) (96% - 99%)      Adult Advanced Hemodynamics Last 24 Hrs  CVP(mm Hg): --  CVP(cm H2O): --  CO: --  CI: --  PA: --  PA(mean): --  PCWP: --  SVR: --  SVRI: --  PVR: --  PVRI: --  CAPILLARY BLOOD GLUCOSE        CAPILLARY BLOOD GLUCOSE        I&O's Summary    09 Aug 2019 07:01  -  10 Aug 2019 07:00  --------------------------------------------------------  IN: 217.5 mL / OUT: 700 mL / NET: -482.5 mL    10 Aug 2019 07:01  -  11 Aug 2019 06:31  --------------------------------------------------------  IN: 425 mL / OUT: 3375 mL / NET: -2950 mL      Daily     Daily     PHYSICAL EXAMINATION:  General: WN/WD NAD  HEENT: PERRLA, EOMI, moist mucous membranes  Neurology: A&Ox3, nonfocal, MUÑOZ x 4  Respiratory: CTA B/L, normal respiratory effort, no wheezes, crackles, rales  CV: RRR, S1S2, no murmurs, rubs or gallops  Abdominal: Soft, NT, ND +BS, Last BM  Extremities: No edema, + peripheral pulses  Incisions:   Tubes:    LABS:                          14.0   12.0  )-----------( 254      ( 11 Aug 2019 05:21 )             42.0     08-11    134<L>  |  99  |  17  ----------------------------<  106<H>  4.7   |  20<L>  |  0.89    Ca    9.2      11 Aug 2019 05:21  Phos  4.4     08-11  Mg     2.3     08-11    TPro  7.7  /  Alb  3.9  /  TBili  0.4  /  DBili  x   /  AST  270<H>  /  ALT  250<H>  /  AlkPhos  71  08-11    LIVER FUNCTIONS - ( 11 Aug 2019 05:21 )  Alb: 3.9 g/dL / Pro: 7.7 g/dL / ALK PHOS: 71 U/L / ALT: 250 U/L / AST: 270 U/L / GGT: x           PT/INR - ( 11 Aug 2019 05:21 )   PT: 13.9 sec;   INR: 1.20 ratio         PTT - ( 11 Aug 2019 05:21 )  PTT:27.2 sec  CKMB Units: 14.1 ng/mL (08-10 @ 12:45)  Creatine Kinase, Serum: 171 U/L (08-10 @ 12:45)    CARDIAC MARKERS ( 10 Aug 2019 12:45 )  x     / x     / 171 U/L / x     / 14.1 ng/mL  CARDIAC MARKERS ( 10 Aug 2019 06:20 )  x     / x     / 183 U/L / x     / 18.5 ng/mL  CARDIAC MARKERS ( 10 Aug 2019 00:50 )  x     / x     / 208 U/L / x     / 18.6 ng/mL          TELEMETRY:     EKG:     IMAGING: PATIENT:  CAMILLA STILL  [id number]    CHIEF COMPLAINT:  Patient is a 54y old  Male who presents with a chief complaint of NSTEMI (10 Aug 2019 21:56)      INTERVAL HISTORYOVERNIGHT EVENTS: off nitro gtt, on PO metoprolol/hydral/isordil. Lisinopril started. Echo showed LV thrombus, started on eliquis and brilinta switched to plavix for triple therapy.      REVIEW OF SYSTEMS:    Constitutional:     [ ] negative [ ] fevers [ ] chills [ ] weight loss [ ] weight gain  HEENT:                  [ ] negative [ ] dry eyes [ ] eye irritation [ ] postnasal drip [ ] nasal congestion  CV:                         [ ] negative  [ ] chest pain [ ] orthopnea [ ] palpitations [ ] murmur  Resp:                     [ ] negative [ ] cough [ ] shortness of breath [ ] dyspnea [ ] wheezing [ ] sputum [ ] hemoptysis  GI:                          [ ] negative [ ] nausea [ ] vomiting [ ] diarrhea [ ] constipation [ ] abd pain [ ] dysphagia   :                        [ ] negative [ ] dysuria [ ] nocturia [ ] hematuria [ ] increased urinary frequency  Musculoskeletal: [ ] negative [ ] back pain [ ] myalgias [ ] arthralgias [ ] fracture  Skin:                       [ ] negative [ ] rash [ ] itch  Neurological:        [ ] negative [ ] headache [ ] dizziness [ ] syncope [ ] weakness [ ] numbness  Psychiatric:           [ ] negative [ ] anxiety [ ] depression  Endocrine:            [ ] negative [ ] diabetes [ ] thyroid problem  Heme/Lymph:      [ ] negative [ ] anemia [ ] bleeding problem  Allergic/Immune: [ ] negative [ ] itchy eyes [ ] nasal discharge [ ] hives [ ] angioedema    [x] All other systems negative  [ ] Unable to assess ROS because ________.    MEDICATIONS:  MEDICATIONS  (STANDING):  apixaban 5 milliGRAM(s) Oral two times a day  aspirin  chewable 81 milliGRAM(s) Oral daily  atorvastatin 80 milliGRAM(s) Oral at bedtime  chlorhexidine 2% Cloths 1 Application(s) Topical at bedtime  clobetasol 0.05% Ointment 1 Application(s) Topical two times a day  hydrALAZINE 25 milliGRAM(s) Oral every 8 hours  isosorbide   dinitrate Tablet (ISORDIL) 10 milliGRAM(s) Oral three times a day  lisinopril 2.5 milliGRAM(s) Oral daily  metoprolol tartrate 25 milliGRAM(s) Oral two times a day    MEDICATIONS  (PRN):  acetaminophen   Tablet .. 650 milliGRAM(s) Oral every 6 hours PRN Temp greater or equal to 38C (100.4F), Mild Pain (1 - 3), Moderate Pain (4 - 6), Severe Pain (7 - 10)  calamine Lotion 1 Application(s) Topical three times a day PRN Itching      ALLERGIES:  Allergies    No Known Allergies    Intolerances        OBJECTIVE:  ICU Vital Signs Last 24 Hrs  T(C): --  T(F): --  HR: 98 (11 Aug 2019 05:00) (64 - 98)  BP: 119/84 (11 Aug 2019 05:00) (118/75 - 168/87)  BP(mean): 92 (11 Aug 2019 05:00) (92 - 136)  ABP: 74/98 (10 Aug 2019 12:00) (74/98 - 164/106)  ABP(mean): 40 (10 Aug 2019 12:00) (40 - 124)  RR: 34 (11 Aug 2019 05:00) (16 - 38)  SpO2: 99% (10 Aug 2019 20:44) (96% - 99%)      Adult Advanced Hemodynamics Last 24 Hrs  CVP(mm Hg): --  CVP(cm H2O): --  CO: --  CI: --  PA: --  PA(mean): --  PCWP: --  SVR: --  SVRI: --  PVR: --  PVRI: --  CAPILLARY BLOOD GLUCOSE        CAPILLARY BLOOD GLUCOSE        I&O's Summary    09 Aug 2019 07:01  -  10 Aug 2019 07:00  --------------------------------------------------------  IN: 217.5 mL / OUT: 700 mL / NET: -482.5 mL    10 Aug 2019 07:01  -  11 Aug 2019 06:31  --------------------------------------------------------  IN: 425 mL / OUT: 3375 mL / NET: -2950 mL      Daily     Daily     PHYSICAL EXAMINATION:  General: WN/WD NAD  HEENT: PERRLA, EOMI, moist mucous membranes  Neurology: A&Ox3, nonfocal, MUÑOZ x 4  Respiratory: CTA B/L, normal respiratory effort, no wheezes, crackles, rales  CV: RRR, S1S2, no murmurs, rubs or gallops  Abdominal: Soft, NT, ND +BS, Last BM  Extremities: No edema, + peripheral pulses  Incisions:   Tubes:    LABS:                          14.0   12.0  )-----------( 254      ( 11 Aug 2019 05:21 )             42.0     08-11    134<L>  |  99  |  17  ----------------------------<  106<H>  4.7   |  20<L>  |  0.89    Ca    9.2      11 Aug 2019 05:21  Phos  4.4     08-11  Mg     2.3     08-11    TPro  7.7  /  Alb  3.9  /  TBili  0.4  /  DBili  x   /  AST  270<H>  /  ALT  250<H>  /  AlkPhos  71  08-11    LIVER FUNCTIONS - ( 11 Aug 2019 05:21 )  Alb: 3.9 g/dL / Pro: 7.7 g/dL / ALK PHOS: 71 U/L / ALT: 250 U/L / AST: 270 U/L / GGT: x           PT/INR - ( 11 Aug 2019 05:21 )   PT: 13.9 sec;   INR: 1.20 ratio         PTT - ( 11 Aug 2019 05:21 )  PTT:27.2 sec  CKMB Units: 14.1 ng/mL (08-10 @ 12:45)  Creatine Kinase, Serum: 171 U/L (08-10 @ 12:45)    CARDIAC MARKERS ( 10 Aug 2019 12:45 )  x     / x     / 171 U/L / x     / 14.1 ng/mL  CARDIAC MARKERS ( 10 Aug 2019 06:20 )  x     / x     / 183 U/L / x     / 18.5 ng/mL  CARDIAC MARKERS ( 10 Aug 2019 00:50 )  x     / x     / 208 U/L / x     / 18.6 ng/mL          TELEMETRY:     EKG:     IMAGING: PATIENT:  CAMILLA STILL  [id number]    CHIEF COMPLAINT:  Patient is a 54y old  Male who presents with a chief complaint of NSTEMI (10 Aug 2019 21:56)      INTERVAL HISTORYOVERNIGHT EVENTS: off nitro gtt, on PO metoprolol/hydral/isordil. Lisinopril started. Echo showed LV thrombus, started on eliquis and brilinta switched to plavix for triple therapy.      REVIEW OF SYSTEMS:    Constitutional:     [ ] negative [ ] fevers [ ] chills [ ] weight loss [ ] weight gain  HEENT:                  [ ] negative [ ] dry eyes [ ] eye irritation [ ] postnasal drip [ ] nasal congestion  CV:                         [ ] negative  [ ] chest pain [ ] orthopnea [ ] palpitations [ ] murmur  Resp:                     [ ] negative [ ] cough [ ] shortness of breath [ ] dyspnea [ ] wheezing [ ] sputum [ ] hemoptysis  GI:                          [ ] negative [ ] nausea [ ] vomiting [ ] diarrhea [ ] constipation [ ] abd pain [ ] dysphagia   :                        [ ] negative [ ] dysuria [ ] nocturia [ ] hematuria [ ] increased urinary frequency  Musculoskeletal: [ ] negative [ ] back pain [ ] myalgias [ ] arthralgias [ ] fracture  Skin:                       [ ] negative [ ] rash [ ] itch  Neurological:        [ ] negative [ ] headache [ ] dizziness [ ] syncope [ ] weakness [ ] numbness  Psychiatric:           [ ] negative [ ] anxiety [ ] depression  Endocrine:            [ ] negative [ ] diabetes [ ] thyroid problem  Heme/Lymph:      [ ] negative [ ] anemia [ ] bleeding problem  Allergic/Immune: [ ] negative [ ] itchy eyes [ ] nasal discharge [ ] hives [ ] angioedema    [x] All other systems negative  [ ] Unable to assess ROS because ________.    MEDICATIONS:  MEDICATIONS  (STANDING):  apixaban 5 milliGRAM(s) Oral two times a day  aspirin  chewable 81 milliGRAM(s) Oral daily  atorvastatin 80 milliGRAM(s) Oral at bedtime  chlorhexidine 2% Cloths 1 Application(s) Topical at bedtime  clobetasol 0.05% Ointment 1 Application(s) Topical two times a day  hydrALAZINE 25 milliGRAM(s) Oral every 8 hours  isosorbide   dinitrate Tablet (ISORDIL) 10 milliGRAM(s) Oral three times a day  lisinopril 2.5 milliGRAM(s) Oral daily  metoprolol tartrate 25 milliGRAM(s) Oral two times a day    MEDICATIONS  (PRN):  acetaminophen   Tablet .. 650 milliGRAM(s) Oral every 6 hours PRN Temp greater or equal to 38C (100.4F), Mild Pain (1 - 3), Moderate Pain (4 - 6), Severe Pain (7 - 10)  calamine Lotion 1 Application(s) Topical three times a day PRN Itching      ALLERGIES:  Allergies    No Known Allergies    Intolerances        OBJECTIVE:  ICU Vital Signs Last 24 Hrs  T(C): --  T(F): --  HR: 98 (11 Aug 2019 05:00) (64 - 98)  BP: 119/84 (11 Aug 2019 05:00) (118/75 - 168/87)  BP(mean): 92 (11 Aug 2019 05:00) (92 - 136)  ABP: 74/98 (10 Aug 2019 12:00) (74/98 - 164/106)  ABP(mean): 40 (10 Aug 2019 12:00) (40 - 124)  RR: 34 (11 Aug 2019 05:00) (16 - 38)  SpO2: 99% (10 Aug 2019 20:44) (96% - 99%)      Adult Advanced Hemodynamics Last 24 Hrs  CVP(mm Hg): --  CVP(cm H2O): --  CO: --  CI: --  PA: --  PA(mean): --  PCWP: --  SVR: --  SVRI: --  PVR: --  PVRI: --  CAPILLARY BLOOD GLUCOSE        CAPILLARY BLOOD GLUCOSE        I&O's Summary    09 Aug 2019 07:01  -  10 Aug 2019 07:00  --------------------------------------------------------  IN: 217.5 mL / OUT: 700 mL / NET: -482.5 mL    10 Aug 2019 07:01  -  11 Aug 2019 06:31  --------------------------------------------------------  IN: 425 mL / OUT: 3375 mL / NET: -2950 mL      Daily     Daily     PHYSICAL EXAMINATION:  General: WN/WD NAD  HEENT: PERRLA, EOMI, moist mucous membranes  Neurology: A&Ox3, nonfocal, MUÑOZ x 4  Respiratory: CTA B/L, normal respiratory effort, no wheezes, crackles, rales  CV: RRR, S1S2, no murmurs, rubs or gallops  Abdominal: Soft, NT, ND +BS, Last BM  Extremities: No edema, + peripheral pulses  Skin: Diffuse macular purpural rash to b/l forearms with erythematous raised linear appearance at margins, involvement of abd and torso scattered  Lines: PIV    LABS:                          14.0   12.0  )-----------( 254      ( 11 Aug 2019 05:21 )             42.0     08-11    134<L>  |  99  |  17  ----------------------------<  106<H>  4.7   |  20<L>  |  0.89    Ca    9.2      11 Aug 2019 05:21  Phos  4.4     08-11  Mg     2.3     08-11    TPro  7.7  /  Alb  3.9  /  TBili  0.4  /  DBili  x   /  AST  270<H>  /  ALT  250<H>  /  AlkPhos  71  08-11    LIVER FUNCTIONS - ( 11 Aug 2019 05:21 )  Alb: 3.9 g/dL / Pro: 7.7 g/dL / ALK PHOS: 71 U/L / ALT: 250 U/L / AST: 270 U/L / GGT: x           PT/INR - ( 11 Aug 2019 05:21 )   PT: 13.9 sec;   INR: 1.20 ratio         PTT - ( 11 Aug 2019 05:21 )  PTT:27.2 sec  CKMB Units: 14.1 ng/mL (08-10 @ 12:45)  Creatine Kinase, Serum: 171 U/L (08-10 @ 12:45)    CARDIAC MARKERS ( 10 Aug 2019 12:45 )  x     / x     / 171 U/L / x     / 14.1 ng/mL  CARDIAC MARKERS ( 10 Aug 2019 06:20 )  x     / x     / 183 U/L / x     / 18.5 ng/mL  CARDIAC MARKERS ( 10 Aug 2019 00:50 )  x     / x     / 208 U/L / x     / 18.6 ng/mL          TELEMETRY:     EKG:     IMAGING:

## 2019-08-11 NOTE — PROGRESS NOTE ADULT - SUBJECTIVE AND OBJECTIVE BOX
====================  CCU MIDNIGHT ROUNDS  ====================    CAMILLA STILL  [id number]    Patient is a 54y old  Male who presents with a chief complaint of NSTEMI (11 Aug 2019 06:30)    ====================  SUMMARY:  ====================      ====================  NEW EVENTS:  ====================    MEDICATIONS  (STANDING):  apixaban 5 milliGRAM(s) Oral two times a day  aspirin  chewable 81 milliGRAM(s) Oral daily  atorvastatin 40 milliGRAM(s) Oral at bedtime  chlorhexidine 2% Cloths 1 Application(s) Topical at bedtime  clobetasol 0.05% Ointment 1 Application(s) Topical two times a day  hydrALAZINE 25 milliGRAM(s) Oral every 8 hours  lisinopril 5 milliGRAM(s) Oral daily  metoprolol tartrate 25 milliGRAM(s) Oral two times a day  predniSONE   Tablet 40 milliGRAM(s) Oral daily    MEDICATIONS  (PRN):  acetaminophen   Tablet .. 650 milliGRAM(s) Oral every 6 hours PRN Temp greater or equal to 38C (100.4F), Mild Pain (1 - 3), Moderate Pain (4 - 6), Severe Pain (7 - 10)  calamine Lotion 1 Application(s) Topical three times a day PRN Itching    ====================  VITALS (Last 12 hrs):  ====================  T(C): 37 (08-11-19 @ 18:00), Max: 37 (08-11-19 @ 18:00)  T(F): 98.6 (08-11-19 @ 18:00), Max: 98.6 (08-11-19 @ 18:00)  HR: 82 (08-11-19 @ 21:00) (60 - 92)  BP: 151/83 (08-11-19 @ 21:00) (106/63 - 151/83)  BP(mean): 106 (08-11-19 @ 21:00) (78 - 106)  RR: 42 (08-11-19 @ 21:00) (15 - 42)  SpO2: 98% (08-11-19 @ 18:00) (96% - 99%)      I&O's Summary    10 Aug 2019 07:01  -  11 Aug 2019 07:00  --------------------------------------------------------  IN: 425 mL / OUT: 3375 mL / NET: -2950 mL    11 Aug 2019 07:01  -  11 Aug 2019 21:15  --------------------------------------------------------  IN: 720 mL / OUT: 0 mL / NET: 720 mL        ====================  NEW LABS:  ====================  08-11    134<L>  |  99  |  17  ----------------------------<  106<H>  4.7   |  20<L>  |  0.89    Ca    9.2      11 Aug 2019 05:21  Phos  4.4     08-11  Mg     2.3     08-11    TPro  7.7  /  Alb  3.9  /  TBili  0.4  /  DBili  x   /  AST  270<H>  /  ALT  250<H>  /  AlkPhos  71  08-11    PT/INR - ( 11 Aug 2019 05:21 )   PT: 13.9 sec;   INR: 1.20 ratio       PTT - ( 11 Aug 2019 05:21 )  PTT:27.2 sec    ====================  PLAN:  ====================      Holli Knight U NP  Beeper #[id number]  Spectra # [phone] ====================  CCU MIDNIGHT ROUNDS  ====================    CAMILLA STILL  [id number]    Patient is a 54y old  Male who presents with a chief complaint of NSTEMI (11 Aug 2019 06:30)    ====================  SUMMARY: 54M Azerbaijani speaking current smoker, no known PMH 2/2 not seeing a doctor for many yrs who presented to [place] w/ chest +back pain, elevated BPs and elevated troponin, transferred to [facility] for mgmt of HTN emergency w/ NSTEMI, now s/p C 8/10 showing 100%occ to AD s/p RADHA x1.  ====================      ====================  NEW EVENTS: None   ====================    MEDICATIONS  (STANDING):  apixaban 5 milliGRAM(s) Oral two times a day  aspirin  chewable 81 milliGRAM(s) Oral daily  atorvastatin 40 milliGRAM(s) Oral at bedtime  chlorhexidine 2% Cloths 1 Application(s) Topical at bedtime  clobetasol 0.05% Ointment 1 Application(s) Topical two times a day  hydrALAZINE 25 milliGRAM(s) Oral every 8 hours  lisinopril 5 milliGRAM(s) Oral daily  metoprolol tartrate 25 milliGRAM(s) Oral two times a day  predniSONE   Tablet 40 milliGRAM(s) Oral daily    MEDICATIONS  (PRN):  acetaminophen   Tablet .. 650 milliGRAM(s) Oral every 6 hours PRN Temp greater or equal to 38C (100.4F), Mild Pain (1 - 3), Moderate Pain (4 - 6), Severe Pain (7 - 10)  calamine Lotion 1 Application(s) Topical three times a day PRN Itching    ====================  VITALS (Last 12 hrs):  ====================  T(C): 37 (08-11-19 @ 18:00), Max: 37 (08-11-19 @ 18:00)  T(F): 98.6 (08-11-19 @ 18:00), Max: 98.6 (08-11-19 @ 18:00)  HR: 82 (08-11-19 @ 21:00) (60 - 92)  BP: 151/83 (08-11-19 @ 21:00) (106/63 - 151/83)  BP(mean): 106 (08-11-19 @ 21:00) (78 - 106)  RR: 42 (08-11-19 @ 21:00) (15 - 42)  SpO2: 98% (08-11-19 @ 18:00) (96% - 99%)      I&O's Summary    10 Aug 2019 07:01  -  11 Aug 2019 07:00  --------------------------------------------------------  IN: 425 mL / OUT: 3375 mL / NET: -2950 mL    11 Aug 2019 07:01  -  11 Aug 2019 21:15  --------------------------------------------------------  IN: 720 mL / OUT: 0 mL / NET: 720 mL        ====================  NEW LABS:  ====================  08-11    #Derm  -pt w/ diffuse pruritic weeping rash in the s/o of recent outing in the woods, c/w poison ivy. interdigital vesicles noted, though given history, lower susp for scabies justin  -will administer calamine lotion + topical corticosteroids (clobetasol ointment BID) for symptom control  -holding oral prednisone given pt is on antiplatelets w/ recent hep gtt, would prefer to avoid prolongued healing of fem cath site  -will hold off on bactrim as arm does not appear to be superinfected justin  -derm consulted for assistance with diagnosis and treatment, will f/u [id number]<L>  |  99  |  17  ----------------------------<  106<H>  4.7   |  20<L>  |  0.89    Ca    9.2      11 Aug 2019 05:21  Phos  4.4     08-11  Mg     2.3     08-11    TPro  7.7  /  Alb  3.9  /  TBili  0.4  /  DBili  x   /  AST  270<H>  /  ALT  250<H>  /  AlkPhos  71  08-11    PT/INR - ( 11 Aug 2019 05:21 )   PT: 13.9 sec;   INR: 1.20 ratio       PTT - ( 11 Aug 2019 05:21 )  PTT:27.2 sec    ====================  PLAN:  ====================  #NSTEMI s/p RADHA to mLAD  - Cont. DAPT + High intensity statin; monitor LFTs closely   - Currently on triple therapy, plan stop ASA outpatient in 1 month   - Change Metoprolol 25mg q12h to Toprol 50mg daily   - Increase Lisinopril to 10mg daily   - CE trended down   - DC planning     #Chronic systolic HF; EF 25%  - EP consult in AM for possible Lifevest   - Euvolemic on exam; c/w BB/ACE-i     #LV Thrombus   - Currently off label NOAC per Dr. Yusuf   - Triple therapy x 1month, stop ASA in 1 month         Holli Knight CCU NP  Beeper #[id number]  Spectra # [phone] ====================  CCU MIDNIGHT ROUNDS  ====================    CAMILLA STILL  [id number]    Patient is a 54y old  Male who presents with a chief complaint of NSTEMI (11 Aug 2019 06:30)    ====================  SUMMARY: 54M Central African speaking current smoker, no known PMH 2/2 not seeing a doctor for many yrs who presented to [place] w/ chest +back pain, elevated BPs and elevated troponin, transferred to [facility] for mgmt of HTN emergency w/ NSTEMI, now s/p C 8/10 showing 100%occ to AD s/p RADHA x1.  ====================      ====================  NEW EVENTS: None   ====================    MEDICATIONS  (STANDING):  apixaban 5 milliGRAM(s) Oral two times a day  aspirin  chewable 81 milliGRAM(s) Oral daily  atorvastatin 40 milliGRAM(s) Oral at bedtime  chlorhexidine 2% Cloths 1 Application(s) Topical at bedtime  clobetasol 0.05% Ointment 1 Application(s) Topical two times a day  hydrALAZINE 25 milliGRAM(s) Oral every 8 hours  lisinopril 5 milliGRAM(s) Oral daily  metoprolol tartrate 25 milliGRAM(s) Oral two times a day  predniSONE   Tablet 40 milliGRAM(s) Oral daily    MEDICATIONS  (PRN):  acetaminophen   Tablet .. 650 milliGRAM(s) Oral every 6 hours PRN Temp greater or equal to 38C (100.4F), Mild Pain (1 - 3), Moderate Pain (4 - 6), Severe Pain (7 - 10)  calamine Lotion 1 Application(s) Topical three times a day PRN Itching    ====================  VITALS (Last 12 hrs):  ====================  T(C): 37 (08-11-19 @ 18:00), Max: 37 (08-11-19 @ 18:00)  T(F): 98.6 (08-11-19 @ 18:00), Max: 98.6 (08-11-19 @ 18:00)  HR: 82 (08-11-19 @ 21:00) (60 - 92)  BP: 151/83 (08-11-19 @ 21:00) (106/63 - 151/83)  BP(mean): 106 (08-11-19 @ 21:00) (78 - 106)  RR: 42 (08-11-19 @ 21:00) (15 - 42)  SpO2: 98% (08-11-19 @ 18:00) (96% - 99%)      I&O's Summary    10 Aug 2019 07:01  -  11 Aug 2019 07:00  --------------------------------------------------------  IN: 425 mL / OUT: 3375 mL / NET: -2950 mL    11 Aug 2019 07:01  -  11 Aug 2019 21:15  --------------------------------------------------------  IN: 720 mL / OUT: 0 mL / NET: 720 mL        ====================  NEW LABS:  ====================  08-11    134<L>  |  99  |  17  ----------------------------<  106<H>  4.7   |  20<L>  |  0.89    Ca    9.2      11 Aug 2019 05:21  Phos  4.4     08-11  Mg     2.3     08-11    TPro  7.7  /  Alb  3.9  /  TBili  0.4  /  DBili  x   /  AST  270<H>  /  ALT  250<H>  /  AlkPhos  71  08-11    PT/INR - ( 11 Aug 2019 05:21 )   PT: 13.9 sec;   INR: 1.20 ratio       PTT - ( 11 Aug 2019 05:21 )  PTT:27.2 sec    ====================  PLAN:  ====================  #NSTEMI s/p RADHA to mLAD  - Cont. DAPT + High intensity statin; monitor LFTs closely   - Currently on triple therapy, plan stop ASA outpatient in 1 month   - Cont. Metoprolol 25mg q12h, change to Toprol XL upon DC   - Cont. Lisinopril 5mg daily; titrate up if BP tolerates    - CE trended down   - DC planning     #Chronic systolic HF; EF 25%  - EP consult in AM for possible Lifevest   - Euvolemic on exam; c/w BB/ACE-i     #LV Thrombus   - Cont. Eliquis 5mg BD (Dr. Yusuf)     #Derm  - Predisone 3 week hayde for presumed poison ivy   - F/U Derm recs       Holli Knight CCU NP  Beeper #[id number]  Spectra # [phone] ====================  CCU MIDNIGHT ROUNDS  ====================    CAMILLA STILL  [id number]    Patient is a 54y old  Male who presents with a chief complaint of NSTEMI (11 Aug 2019 06:30)    ====================  SUMMARY: 54M Chadian speaking current smoker, no known PMH 2/2 not seeing a doctor for many yrs who presented to [place] w/ chest +back pain, elevated BPs and elevated troponin, transferred to [facility] for mgmt of HTN emergency w/ NSTEMI, now s/p C 8/10 showing 100%occ to AD s/p RADHA x1.  ====================      ====================  NEW EVENTS: None   ====================    MEDICATIONS  (STANDING):  apixaban 5 milliGRAM(s) Oral two times a day  aspirin  chewable 81 milliGRAM(s) Oral daily  atorvastatin 40 milliGRAM(s) Oral at bedtime  chlorhexidine 2% Cloths 1 Application(s) Topical at bedtime  clobetasol 0.05% Ointment 1 Application(s) Topical two times a day  hydrALAZINE 25 milliGRAM(s) Oral every 8 hours  lisinopril 5 milliGRAM(s) Oral daily  metoprolol tartrate 25 milliGRAM(s) Oral two times a day  predniSONE   Tablet 40 milliGRAM(s) Oral daily    MEDICATIONS  (PRN):  acetaminophen   Tablet .. 650 milliGRAM(s) Oral every 6 hours PRN Temp greater or equal to 38C (100.4F), Mild Pain (1 - 3), Moderate Pain (4 - 6), Severe Pain (7 - 10)  calamine Lotion 1 Application(s) Topical three times a day PRN Itching    ====================  VITALS (Last 12 hrs):  ====================  T(C): 37 (08-11-19 @ 18:00), Max: 37 (08-11-19 @ 18:00)  T(F): 98.6 (08-11-19 @ 18:00), Max: 98.6 (08-11-19 @ 18:00)  HR: 82 (08-11-19 @ 21:00) (60 - 92)  BP: 151/83 (08-11-19 @ 21:00) (106/63 - 151/83)  BP(mean): 106 (08-11-19 @ 21:00) (78 - 106)  RR: 42 (08-11-19 @ 21:00) (15 - 42)  SpO2: 98% (08-11-19 @ 18:00) (96% - 99%)      I&O's Summary    10 Aug 2019 07:01  -  11 Aug 2019 07:00  --------------------------------------------------------  IN: 425 mL / OUT: 3375 mL / NET: -2950 mL    11 Aug 2019 07:01  -  11 Aug 2019 21:15  --------------------------------------------------------  IN: 720 mL / OUT: 0 mL / NET: 720 mL        ====================  NEW LABS:  ====================  08-11    134<L>  |  99  |  17  ----------------------------<  106<H>  4.7   |  20<L>  |  0.89    Ca    9.2      11 Aug 2019 05:21  Phos  4.4     08-11  Mg     2.3     08-11    TPro  7.7  /  Alb  3.9  /  TBili  0.4  /  DBili  x   /  AST  270<H>  /  ALT  250<H>  /  AlkPhos  71  08-11    PT/INR - ( 11 Aug 2019 05:21 )   PT: 13.9 sec;   INR: 1.20 ratio       PTT - ( 11 Aug 2019 05:21 )  PTT:27.2 sec    ====================  PLAN:  ====================  #NSTEMI s/p RADHA to mLAD  - Cont. DAPT + High intensity statin; monitor LFTs closely   - Currently on triple therapy, plan stop ASA outpatient in 1 month   - Cont. Metoprolol 25mg q12h, change to Toprol XL upon DC   - Cont. Lisinopril 5mg daily; titrate up if BP tolerates    - CE trended down   - DC planning     #Chronic systolic HF; EF 25%  - EP consult in AM for possible Lifevest   - Euvolemic on exam; c/w BB/ACE-i     #LV Thrombus   - Cont. Eliquis 5mg BID (Dr. Yusuf)     #Derm  - Predisone 3 week hayde for presumed poison ivy   - F/U Derm recs       Holli Knight CCU NP  Beeper #[id number]  Spectra # [phone]

## 2019-08-11 NOTE — PROGRESS NOTE ADULT - ASSESSMENT
54M Zimbabwean speaking current smoker, no known PMH 2/2 not seeing a doctor for many yrs who presented to [place] w/ chest +back pain, elevated BPs and elevated troponin, transferred to [facility] for mgmt of HTN emergency w/ NSTEMI, now s/p LHC 8/10 showing 100%occ to mLAD s/p RADHA x1.      #Neuro  -mentating at baseline, A0 x 4    #Pulm  - satting well on RA  - CTA Chest at OSH unremarkable for PE but did show lung nodule  - pt will need 12 mth repeat CT chest as oupt for f/u nodule    #CV  NSTEMI  Linn score: 74 (1.8% prob of death < 6mths)  ANTWAN score: 2 (8% risk in 14d)  --now s/p C on 8/10 showing 100% occlusion to mLAD (thought to be chronic) w/ 1 RADHA to mLAD via R fem  -currently HDS  -found to have elevated troponins to 506 -> 1176  -CK peaked at 208 ->183, will no longer trend  -CTA chest at OSH neg for acute dissection  -TTE in AM  -s/p asa, brilinta load at OSH, hep gtt  -c/w asa 81mg, brilinta 90 mg qd  -start atorvastatin 80mg qhs  -started on metoprolol 25 BID, hydralazine 10 TID, and isordil 10 TID - off nitro gtt  -monitor BPs  -start ACE/ARB when tolerated (pt just had 2 contrast procedures, will wait 24hrs prior to starting)    Hypertensive Emergency  -presented to OSH w/ BP 170s/110s w/ concurrent ACS  -now weaned off nitro gtt  -started on isordil 10 TID, metoprolol 25 BID, and hydral 10 TID  -plan to start ACEi   -monitor BPs; goal <160/110 over 24hrs from presentation    #GI  -Diet: DASH  -found to have transaminitis   -possibly elevated in the setting of NSTEMI/HTN emergency  -will continue to trend  -no abd pain    #Derm  -pt w/ diffuse pruritic weeping rash in the s/o of recent outing in the woods, c/w poison ivy. interdigital vesicles noted, though given history, lower susp for scabies justin  -will administer calamine lotion + topical corticosteroids (clobetasol ointment BID) for symptom control  -holding oral prednisone given pt is on antiplatelets w/ recent hep gtt, would prefer to avoid prolongued healing of fem cath site  -will hold off on bactrim as arm does not appear to be superinfected justin  -derm consulted for assistance with diagnosis and treatment, will f/u recs    #Heme  -Hgb stable, no signs of acute bleed    #Endo  -HbA1c and lipid panel in AM  -no active issues    #Renal  -Cr 0.89  -continue to monitor  -replete lytes as necessary    #ID:  -leukocytosis to 18.3, afebrile. suspect 2/2 recent prednisone use, lower susp for systemic infection  -blood cx's if febrile  -will continue to monitor     #DVT ppx:  -on hep gtt for NSTEMI      Franklyn Dickson  PGY2, Internal Medicine  CCU  pager [phone] 54M Angolan speaking current smoker, no known PMH 2/2 not seeing a doctor for many yrs who presented to [place] w/ chest +back pain, elevated BPs and elevated troponin, transferred to [facility] for mgmt of HTN emergency w/ NSTEMI, now s/p LHC 8/10 showing 100%occ to mLAD s/p RADHA x1.      #Neuro  -mentating at baseline, A0 x 4    #Pulm  - satting well on RA  - CTA Chest at OSH unremarkable for PE but did show lung nodule  - pt will need 12 mth repeat CT chest as oupt for f/u nodule    #CV  NSTEMI  Linn score: 74 (1.8% prob of death < 6mths)  ANTWAN score: 2 (8% risk in 14d)  --now s/p LHC on 8/10 showing 100% occlusion to mLAD (thought to be chronic) w/ 1 RADHA to mLAD via R fem  -found to have elevated troponins to 506 -> 1176  -CK peaked at 208 ->183, will no longer trend  -CTA chest at OSH neg for acute dissection  -TTE w/ LV thrombus - EF 25%  -c/w asa 81mg, brilinta 90 mg qd  -Statin decreased to 40 lipitor (uptrending LFTs)  -monitor BPs    Hypertensive Emergency  -presented to OSH w/ BP 170s/110s w/ concurrent ACS  -now weaned off nitro gtt  -started on isordil 10 TID, metoprolol 25 BID, and hydral 10 TID  - Lisinopril 5mg started  -monitor BPs; goal <160/110 over 24hrs from presentation    #GI  -Diet: DASH  -found to have transaminitis - lowered dose of lipitor  -possibly elevated in the setting of NSTEMI/HTN emergency v statin  -will continue to trend  -no abd pain    #Derm  -pt w/ diffuse pruritic weeping rash in the s/o of recent outing in the woods, c/w poison ivy. interdigital vesicles noted, though given history, lower susp for scabies justin  -will administer calamine lotion + topical corticosteroids (clobetasol ointment BID) for symptom control  -holding oral prednisone given pt is on antiplatelets w/ recent hep gtt, would prefer to avoid prolongued healing of fem cath site  -will hold off on bactrim as arm does not appear to be superinfected justin  -derm consulted for assistance with diagnosis and treatment, will f/u recs    #Heme  -Hgb stable, no signs of acute bleed  -DAP and eliquis    #Endo  -HbA1c and lipid panel in AM  -no active issues    #Renal  -Cr 0.89  -continue to monitor  -replete lytes as necessary    #ID:  -leukocytosis to 18.3, afebrile. suspect 2/2 recent prednisone use, lower susp for systemic infection  -blood cx's if febrile  -will continue to monitor     #DVT ppx:  -on eliquis for lv thrombus      Franklyn Dickson  PGY2, Internal Medicine  CCU  pager [phone] 54M Welsh speaking current smoker, no known PMH 2/2 not seeing a doctor for many yrs who presented to [place] w/ chest +back pain, elevated BPs and elevated troponin, transferred to [facility] for mgmt of HTN emergency w/ NSTEMI, now s/p LHC 8/10 showing 100%occ to mLAD s/p RADHA x1.      #Neuro  -mentating at baseline, A0 x 4    #Pulm  - satting well on RA  - CTA Chest at OSH unremarkable for PE but did show lung nodule  - pt will need 12 mth repeat CT chest as oupt for f/u nodule    #CV  NSTEMI  Linn score: 74 (1.8% prob of death < 6mths)  ANTWAN score: 2 (8% risk in 14d)  --now s/p LHC on 8/10 showing 100% occlusion to mLAD (thought to be chronic) w/ 1 RADHA to mLAD via R fem  -found to have elevated troponins to 506 -> 1176  -CK peaked at 208 ->183, will no longer trend  -CTA chest at OSH neg for acute dissection  -TTE w/ LV thrombus - EF 25%  -c/w asa 81mg, brilinta 90 mg qd  -Statin decreased to 40 lipitor (uptrending LFTs)  -monitor BPs    Hypertensive Emergency  -presented to OSH w/ BP 170s/110s w/ concurrent ACS  -now weaned off nitro gtt  -started on isordil 10 TID, metoprolol 25 BID, and hydral 10 TID - wean isordil as tolerated  - Lisinopril 5mg started  -monitor BPs; goal <160/110 over 24hrs from presentation    #GI  -Diet: DASH  -found to have transaminitis - lowered dose of lipitor  -possibly elevated in the setting of NSTEMI/HTN emergency v statin  -will continue to trend  -no abd pain    #Derm  -pt w/ diffuse pruritic weeping rash in the s/o of recent outing in the woods, c/w poison ivy. interdigital vesicles noted, though given history, lower susp for scabies justin  -will administer calamine lotion + topical corticosteroids (clobetasol ointment BID) for symptom control  -holding oral prednisone given pt is on antiplatelets w/ recent hep gtt, would prefer to avoid prolongued healing of fem cath site  -will hold off on bactrim as arm does not appear to be superinfected justin  -derm consulted for assistance with diagnosis and treatment, will f/u recs    #Heme  -Hgb stable, no signs of acute bleed  -DAP and eliquis    #Endo  -HbA1c and lipid panel in AM  -no active issues    #Renal  -Cr 0.89  -continue to monitor  -replete lytes as necessary    #ID:  -leukocytosis to 18.3, afebrile. suspect 2/2 recent prednisone use, lower susp for systemic infection  -blood cx's if febrile  -will continue to monitor     #DVT ppx:  -on eliquis for lv thrombus      Franklyn Dickson  PGY2, Internal Medicine  CCU  pager [phone] 54M [place]ine[place] speaking current smoker, no known PMH 2/2 not seeing a doctor for many yrs who presented to [place] w/ chest +back pain, elevated BPs and elevated troponin, transferred to [facility] for mgmt of HTN emergency w/ NSTEMI, now s/p LHC 8/10 showing 100%occ to mLAD s/p RADHA x1.      #Neuro  -mentating at baseline, A0 x 4    #Pulm  - satting well on RA  - CTA Chest at OSH unremarkable for PE but did show lung nodule  - pt will need 12 mth repeat CT chest as oupt for f/u nodule    #CV  NSTEMI  Linn score: 74 (1.8% prob of death < 6mths)  ANTWAN score: 2 (8% risk in 14d)  --now s/p LHC on 8/10 showing 100% occlusion to mLAD (thought to be chronic) w/ 1 RADHA to mLAD via R fem  -found to have elevated troponins to 506 -> 1176  -CK peaked at 208 ->183, will no longer trend  -CTA chest at OSH neg for acute dissection  -TTE w/ LV thrombus - EF 25%  -c/w asa 81mg, brilinta switched to plavix  -Statin decreased to 40 lipitor (uptrending LFTs)  -monitor BPs    Hypertensive Emergency  -presented to OSH w/ BP 170s/110s w/ concurrent ACS  -now weaned off nitro gtt  -started on isordil 10 TID, metoprolol 25 BID, and hydral 10 TID - wean isordil as tolerated  - Lisinopril 5mg started  -monitor BPs; goal <160/110 over 24hrs from presentation    #GI  -Diet: DASH  -found to have transaminitis - lowered dose of lipitor  -possibly elevated in the setting of NSTEMI/HTN emergency v statin  -will continue to trend  -no abd pain    #Derm  -pt w/ diffuse pruritic weeping rash in the s/o of recent outing in the woods, c/w poison ivy. interdigital vesicles noted, though given history, lower susp for scabies justin  -will administer calamine lotion + topical corticosteroids (clobetasol ointment BID) for symptom control  -holding oral prednisone given pt is on antiplatelets w/ recent hep gtt, would prefer to avoid prolongued healing of fem cath site  -will hold off on bactrim as arm does not appear to be superinfected justin  -derm consulted for assistance with diagnosis and treatment, will f/u recs    #Heme  -Hgb stable, no signs of acute bleed  -DAP and eliquis    #Endo  -HbA1c and lipid panel in AM  -no active issues    #Renal  -Cr 0.89  -continue to monitor  -replete lytes as necessary    #ID:  -leukocytosis to 18.3, afebrile. suspect 2/2 recent prednisone use, lower susp for systemic infection  -blood cx's if febrile  -will continue to monitor     #DVT ppx:  -on eliquis for lv thrombus      Franklyn Dickson  PGY2, Internal Medicine  CCU  pager [phone]

## 2019-08-12 LAB
ALBUMIN SERPL ELPH-MCNC: 3.8 G/DL — SIGNIFICANT CHANGE UP (ref 3.3–5)
ALP SERPL-CCNC: 73 U/L — SIGNIFICANT CHANGE UP (ref 40–120)
ALT FLD-CCNC: 233 U/L — HIGH (ref 10–45)
ANION GAP SERPL CALC-SCNC: 13 MMOL/L — SIGNIFICANT CHANGE UP (ref 5–17)
APTT BLD: 52.4 SEC — HIGH (ref 27.5–36.3)
AST SERPL-CCNC: 178 U/L — HIGH (ref 10–40)
BILIRUB SERPL-MCNC: 0.3 MG/DL — SIGNIFICANT CHANGE UP (ref 0.2–1.2)
BUN SERPL-MCNC: 23 MG/DL — SIGNIFICANT CHANGE UP (ref 7–23)
CALCIUM SERPL-MCNC: 9.1 MG/DL — SIGNIFICANT CHANGE UP (ref 8.4–10.5)
CHLORIDE SERPL-SCNC: 101 MMOL/L — SIGNIFICANT CHANGE UP (ref 96–108)
CO2 SERPL-SCNC: 20 MMOL/L — LOW (ref 22–31)
CREAT SERPL-MCNC: 0.84 MG/DL — SIGNIFICANT CHANGE UP (ref 0.5–1.3)
GLUCOSE SERPL-MCNC: 154 MG/DL — HIGH (ref 70–99)
HCT VFR BLD CALC: 40.7 % — SIGNIFICANT CHANGE UP (ref 39–50)
HCT VFR BLD CALC: 41.8 % — SIGNIFICANT CHANGE UP (ref 39–50)
HGB BLD-MCNC: 13.1 G/DL — SIGNIFICANT CHANGE UP (ref 13–17)
HGB BLD-MCNC: 13.6 G/DL — SIGNIFICANT CHANGE UP (ref 13–17)
MAGNESIUM SERPL-MCNC: 2.5 MG/DL — SIGNIFICANT CHANGE UP (ref 1.6–2.6)
MCHC RBC-ENTMCNC: 29.1 PG — SIGNIFICANT CHANGE UP (ref 27–34)
MCHC RBC-ENTMCNC: 29.9 PG — SIGNIFICANT CHANGE UP (ref 27–34)
MCHC RBC-ENTMCNC: 32.1 GM/DL — SIGNIFICANT CHANGE UP (ref 32–36)
MCHC RBC-ENTMCNC: 32.6 GM/DL — SIGNIFICANT CHANGE UP (ref 32–36)
MCV RBC AUTO: 90.6 FL — SIGNIFICANT CHANGE UP (ref 80–100)
MCV RBC AUTO: 91.6 FL — SIGNIFICANT CHANGE UP (ref 80–100)
PHOSPHATE SERPL-MCNC: 4.5 MG/DL — SIGNIFICANT CHANGE UP (ref 2.5–4.5)
PLATELET # BLD AUTO: 225 K/UL — SIGNIFICANT CHANGE UP (ref 150–400)
PLATELET # BLD AUTO: 273 K/UL — SIGNIFICANT CHANGE UP (ref 150–400)
POTASSIUM SERPL-MCNC: 4.8 MMOL/L — SIGNIFICANT CHANGE UP (ref 3.5–5.3)
POTASSIUM SERPL-SCNC: 4.8 MMOL/L — SIGNIFICANT CHANGE UP (ref 3.5–5.3)
PROT SERPL-MCNC: 7.4 G/DL — SIGNIFICANT CHANGE UP (ref 6–8.3)
RBC # BLD: 4.49 M/UL — SIGNIFICANT CHANGE UP (ref 4.2–5.8)
RBC # BLD: 4.56 M/UL — SIGNIFICANT CHANGE UP (ref 4.2–5.8)
RBC # FLD: 12 % — SIGNIFICANT CHANGE UP (ref 10.3–14.5)
RBC # FLD: 12.1 % — SIGNIFICANT CHANGE UP (ref 10.3–14.5)
SODIUM SERPL-SCNC: 134 MMOL/L — LOW (ref 135–145)
WBC # BLD: 13.8 K/UL — HIGH (ref 3.8–10.5)
WBC # BLD: 9.5 K/UL — SIGNIFICANT CHANGE UP (ref 3.8–10.5)
WBC # FLD AUTO: 13.8 K/UL — HIGH (ref 3.8–10.5)
WBC # FLD AUTO: 9.5 K/UL — SIGNIFICANT CHANGE UP (ref 3.8–10.5)

## 2019-08-12 PROCEDURE — 99233 SBSQ HOSP IP/OBS HIGH 50: CPT | Mod: GC

## 2019-08-12 PROCEDURE — 93010 ELECTROCARDIOGRAM REPORT: CPT

## 2019-08-12 PROCEDURE — 99223 1ST HOSP IP/OBS HIGH 75: CPT

## 2019-08-12 RX ORDER — METOPROLOL TARTRATE 50 MG
50 TABLET ORAL DAILY
Refills: 0 | Status: DISCONTINUED | OUTPATIENT
Start: 2019-08-12 | End: 2019-08-13

## 2019-08-12 RX ORDER — WARFARIN SODIUM 2.5 MG/1
7.5 TABLET ORAL ONCE
Refills: 0 | Status: COMPLETED | OUTPATIENT
Start: 2019-08-12 | End: 2019-08-12

## 2019-08-12 RX ORDER — HEPARIN SODIUM 5000 [USP'U]/ML
INJECTION INTRAVENOUS; SUBCUTANEOUS
Qty: 25000 | Refills: 0 | Status: DISCONTINUED | OUTPATIENT
Start: 2019-08-12 | End: 2019-08-16

## 2019-08-12 RX ORDER — WARFARIN SODIUM 2.5 MG/1
5 TABLET ORAL ONCE
Refills: 0 | Status: DISCONTINUED | OUTPATIENT
Start: 2019-08-12 | End: 2019-08-12

## 2019-08-12 RX ORDER — HEPARIN SODIUM 5000 [USP'U]/ML
6500 INJECTION INTRAVENOUS; SUBCUTANEOUS EVERY 6 HOURS
Refills: 0 | Status: DISCONTINUED | OUTPATIENT
Start: 2019-08-12 | End: 2019-08-16

## 2019-08-12 RX ORDER — LISINOPRIL 2.5 MG/1
10 TABLET ORAL DAILY
Refills: 0 | Status: DISCONTINUED | OUTPATIENT
Start: 2019-08-12 | End: 2019-08-13

## 2019-08-12 RX ORDER — HEPARIN SODIUM 5000 [USP'U]/ML
3000 INJECTION INTRAVENOUS; SUBCUTANEOUS EVERY 6 HOURS
Refills: 0 | Status: DISCONTINUED | OUTPATIENT
Start: 2019-08-12 | End: 2019-08-16

## 2019-08-12 RX ADMIN — Medication 25 MILLIGRAM(S): at 05:45

## 2019-08-12 RX ADMIN — CLOPIDOGREL BISULFATE 75 MILLIGRAM(S): 75 TABLET, FILM COATED ORAL at 12:34

## 2019-08-12 RX ADMIN — WARFARIN SODIUM 7.5 MILLIGRAM(S): 2.5 TABLET ORAL at 22:00

## 2019-08-12 RX ADMIN — Medication 40 MILLIGRAM(S): at 05:46

## 2019-08-12 RX ADMIN — HEPARIN SODIUM 1600 UNIT(S)/HR: 5000 INJECTION INTRAVENOUS; SUBCUTANEOUS at 18:44

## 2019-08-12 RX ADMIN — APIXABAN 5 MILLIGRAM(S): 2.5 TABLET, FILM COATED ORAL at 05:46

## 2019-08-12 RX ADMIN — ATORVASTATIN CALCIUM 40 MILLIGRAM(S): 80 TABLET, FILM COATED ORAL at 21:33

## 2019-08-12 RX ADMIN — Medication 650 MILLIGRAM(S): at 23:42

## 2019-08-12 RX ADMIN — Medication 1 APPLICATION(S): at 18:45

## 2019-08-12 RX ADMIN — HEPARIN SODIUM 1400 UNIT(S)/HR: 5000 INJECTION INTRAVENOUS; SUBCUTANEOUS at 11:02

## 2019-08-12 RX ADMIN — LISINOPRIL 5 MILLIGRAM(S): 2.5 TABLET ORAL at 05:45

## 2019-08-12 RX ADMIN — Medication 81 MILLIGRAM(S): at 12:34

## 2019-08-12 RX ADMIN — CHLORHEXIDINE GLUCONATE 1 APPLICATION(S): 213 SOLUTION TOPICAL at 21:33

## 2019-08-12 NOTE — PROGRESS NOTE ADULT - SUBJECTIVE AND OBJECTIVE BOX
Admission date:  CHIEF COMPLAINT:  HPI:  54M tobacco user w/ no known PMH (has not seen a doctor in many years) who presented to [facility] with chest pain, noted to have elevated troponin and transferred to [facility]. Pt reports his symptoms began around noon while eating lunch. He reports 8/10 chest pain/pressure that radiated down his L arm and to his back. He felt short of breath during the episode. He went to the outside hospital where he received sublingual nitro and his symptoms improved. At this time his chest pain is 2-3/10 and he feels much improved. The pt denies any recent chest pain prior to today. No syncope, dyspnea, peripheral edema.     Of note, pt was recently in the woods/garden by a friends house 3d prior. States he had multiple mosquito bites all over his arm and exposure to bushes, does not know whether they looked like poison ivy but recalls that he was wearing a short-sleeved shirt and first noticed the rash on his b/l forearms. Afterwards, developed rash on his legs. The rash is itchy, although less now compared to before. Pt went to a doctor (?[facility]), where he was told he has poison ivy, was Rx'd a steroid taper (took 50mg x1 yesterday only), hydroxyzine PRN and bactrim. Has also been taking his friend's halobetasol which has helped.    At OSH,  /144. Pt was started on heparin and nitro GTT, received ASA/brilinta load, transferred to [facility] for further eval. probnp 816. Trop I 1.420.    ED Course:  /104  HR 94  RR 20 T 98.6F 97%02  In the ED, pt was resumed on heparin and nitro GTT. Transferred to CCU for monitoring of NSTEMI.   Currently denies CP, SOB or palpitations. Has some headaches after starting nitro. (10 Aug 2019 02:09)    INTERVAL HISTORY: Patient seen and examined, denies CP, dyspnea, orthopnea, PND, palpitations and able to lay flat. NAD noted.    REVIEW OF SYSTEMS:    CONSTITUTIONAL: No weakness, fevers or chills  EYES/ENT: No visual changes;  No vertigo or throat pain   NECK: No pain or stiffness  RESPIRATORY: No cough, wheezing, hemoptysis; No shortness of breath  CARDIOVASCULAR: No chest pain or palpitations  GASTROINTESTINAL: No abdominal or epigastric pain. No nausea, vomiting, or hematemesis; No diarrhea or constipation. No melena or hematochezia.  GENITOURINARY: No dysuria, frequency or hematuria  NEUROLOGICAL: No numbness or weakness  SKIN: No itching, rashes      MEDICATIONS  (STANDING):  apixaban 5 milliGRAM(s) Oral two times a day  aspirin  chewable 81 milliGRAM(s) Oral daily  atorvastatin 40 milliGRAM(s) Oral at bedtime  chlorhexidine 2% Cloths 1 Application(s) Topical at bedtime  clobetasol 0.05% Ointment 1 Application(s) Topical two times a day  clopidogrel Tablet 75 milliGRAM(s) Oral daily  hydrALAZINE 25 milliGRAM(s) Oral every 8 hours  lisinopril 5 milliGRAM(s) Oral daily  metoprolol tartrate 25 milliGRAM(s) Oral two times a day  predniSONE   Tablet 40 milliGRAM(s) Oral daily    MEDICATIONS  (PRN):  acetaminophen   Tablet .. 650 milliGRAM(s) Oral every 6 hours PRN Temp greater or equal to 38C (100.4F), Mild Pain (1 - 3), Moderate Pain (4 - 6), Severe Pain (7 - 10)  calamine Lotion 1 Application(s) Topical three times a day PRN Itching      Objective:  ICU Vital Signs Last 24 Hrs  T(C): 36.5 (12 Aug 2019 04:00), Max: 37 (11 Aug 2019 18:00)  T(F): 97.7 (12 Aug 2019 04:00), Max: 98.6 (11 Aug 2019 18:00)  HR: 58 (12 Aug 2019 07:00) (54 - 92)  BP: 114/64 (12 Aug 2019 07:00) (106/63 - 151/83)  BP(mean): 82 (12 Aug 2019 07:00) (78 - 113)  ABP: --  ABP(mean): --  RR: 18 (12 Aug 2019 07:00) (13 - 42)  SpO2: 97% (12 Aug 2019 07:00) (96% - 99%)      08-11 @ 07:01  -  08-12 @ 07:00  --------------------------------------------------------  IN: 720 mL / OUT: 825 mL / NET: -105 mL     Daily Weight in k.9 (12 Aug 2019 05:00)    PHYSICAL EXAM:    General: WN/WD NAD  Neurology: Awake, nonfocal, MUÑOZ x 4  Eyes: Scleras clear, PERRLA/ EOMI, Gross vision intact  ENT:Gross hearing intact, grossly patent pharynx, no stridor  Neck: Neck supple, trachea midline, No JVD,   Respiratory: CTA B/L, No wheezing, rales, rhonchi  CV: RRR, S1S2, no murmurs, rubs or gallops  Abdominal: Soft, NT, ND +BS,   Extremities: No edema, + peripheral pulses  Skin: No Rashes, Hematoma, Ecchymosis  Lymphatic: No Neck, axilla, groin LAD  Psych: Oriented x 3, normal affect  Incisions:       TELEMETRY:     EKG:   < from: 12 Lead ECG (08.10.19 @ 00:59) >    Ventricular Rate 89 BPM    Atrial Rate 89 BPM    P-R Interval 132 ms    QRS Duration 100 ms    Q-T Interval 390 ms    QTC Calculation(Bezet) 474 ms    P Axis 64 degrees    R Axis 23 degrees    T Axis 47 degrees    Diagnosis Line NORMAL SINUS RHYTHM  NONSPECIFIC T WAVE ABNORMALITY  PROLONGED QT  ABNORMAL ECG    < end of copied text >    IMAGING:  < from: Transthoracic Echocardiogram (08.10.19 @ 08:07) >  CONCLUSIONS:  1. The left ventricle is mildly dilated.  There is globaly  hypokinesis with minor regional variation.  The LVEF about  25%.  There is a filling defect in the apical septum with  Definity contrast (and suggested on the non-Defiinty image  #59), consistent with a left ventricular apical thrombus.  2. The right ventricle is not well visualized.  On the  parasternal and right ventricular outflow views the  function is normal.  However, not all the walls are well  seen as the apical views are limited.  3. There is no pericardial effusion.  4. There is no significant valvular regurgitation or  stenosis.  There are no prior studies available for comparison.  Results discussed with Dr. Reggie Santos on 8/10/2019 at  4:33PM when these results were seen.    < end of copied text >    Labs:                          13.6   9.5   )-----------( 225      ( 12 Aug 2019 04:18 )             41.8     08-12    134<L>  |  101  |  23  ----------------------------<  154<H>  4.8   |  20<L>  |  0.84    Ca    9.1      12 Aug 2019 04:18  Phos  4.5     -  Mg     2.5         TPro  7.4  /  Alb  3.8  /  TBili  0.3  /  DBili  x   /  AST  178<H>  /  ALT  233<H>  /  AlkPhos  73  0812    LIVER FUNCTIONS - ( 12 Aug 2019 04:18 )  Alb: 3.8 g/dL / Pro: 7.4 g/dL / ALK PHOS: 73 U/L / ALT: 233 U/L / AST: 178 U/L / GGT: x           PT/INR - ( 11 Aug 2019 05:21 )   PT: 13.9 sec;   INR: 1.20 ratio         PTT - ( 11 Aug 2019 05:21 )  PTT:27.2 sec        HEALTH ISSUES - PROBLEM Dx:

## 2019-08-12 NOTE — PROGRESS NOTE ADULT - SUBJECTIVE AND OBJECTIVE BOX
====================  CCU MIDNIGHT ROUNDS  ====================    CAMILLA STILL  [id number]    Patient is a 54y old  Male who presents with a chief complaint of NSTEMI (12 Aug 2019 12:40)    ====================  SUMMARY: 54M Uruguayan speaking current smoker, no known PMH 2/2 not seeing a doctor for many yrs who presented to [place] w/ chest +back pain, elevated BPs and elevated troponin, transferred to [facility] for mgmt of HTN emergency w/ NSTEMI, now s/p LHC 8/10 showing 100%occ to mLAD s/p RADHA x1.  ====================    ====================  NEW EVENTS: None  ====================    MEDICATIONS  (STANDING):  aspirin  chewable 81 milliGRAM(s) Oral daily  atorvastatin 40 milliGRAM(s) Oral at bedtime  chlorhexidine 2% Cloths 1 Application(s) Topical at bedtime  clobetasol 0.05% Ointment 1 Application(s) Topical two times a day  clopidogrel Tablet 75 milliGRAM(s) Oral daily  heparin  Infusion.  Unit(s)/Hr (14 mL/Hr) IV Continuous <Continuous>  lisinopril 10 milliGRAM(s) Oral daily  metoprolol succinate ER 50 milliGRAM(s) Oral daily  predniSONE   Tablet 40 milliGRAM(s) Oral daily  warfarin 7.5 milliGRAM(s) Oral once    MEDICATIONS  (PRN):  acetaminophen   Tablet .. 650 milliGRAM(s) Oral every 6 hours PRN Temp greater or equal to 38C (100.4F), Mild Pain (1 - 3), Moderate Pain (4 - 6), Severe Pain (7 - 10)  calamine Lotion 1 Application(s) Topical three times a day PRN Itching  heparin  Injectable 6500 Unit(s) IV Push every 6 hours PRN For aPTT less than 40  heparin  Injectable 3000 Unit(s) IV Push every 6 hours PRN For aPTT between 40 - 57    ====================  VITALS (Last 12 hrs):  ====================  T(C): 36.8 (08-12-19 @ 19:00), Max: 36.8 (08-12-19 @ 19:00)  T(F): 98.3 (08-12-19 @ 19:00), Max: 98.3 (08-12-19 @ 19:00)  HR: 64 (08-12-19 @ 20:00) (62 - 82)  BP: 137/78 (08-12-19 @ 20:00) (112/71 - 137/78)  BP(mean): 101 (08-12-19 @ 20:00) (87 - 109)  RR: 19 (08-12-19 @ 20:00) (16 - 23)  SpO2: 96% (08-12-19 @ 16:00) (92% - 98%)        I&O's Summary    11 Aug 2019 07:01  -  12 Aug 2019 07:00  --------------------------------------------------------  IN: 720 mL / OUT: 825 mL / NET: -105 mL    12 Aug 2019 07:01  -  12 Aug 2019 20:44  --------------------------------------------------------  IN: 596 mL / OUT: 900 mL / NET: -304 mL        ====================  NEW LABS:  ====================  08-12    134<L>  |  101  |  23  ----------------------------<  154<H>  4.8   |  20<L>  |  0.84    Ca    9.1      12 Aug 2019 04:18  Phos  4.5     08-12  Mg     2.5     08-12    TPro  7.4  /  Alb  3.8  /  TBili  0.3  /  DBili  x   /  AST  178<H>  /  ALT  233<H>  /  AlkPhos  73  08-12    PT/INR - ( 11 Aug 2019 05:21 )   PT: 13.9 sec;   INR: 1.20 ratio      PTT - ( 12 Aug 2019 17:35 )  PTT:52.4 sec  ====================  PLAN:  ====================  #NSTEMI s/p RADHA to mLAD  - Cont. DAPT + High intensity statin; monitor LFTs   - Currently on triple therapy, plan stop ASA outpatient in 1 month   - Cont. Toprol XL 50mg, Lisinopril 10mg; titrate up if BP tolerates      #Chronic systolic HF; EF 25%  - Euvolemic on exam; c/w BB/ACE-i   - No lifevest due to insurance coverage inability      #LV Thrombus   - Heparin gtt bridge to Coumadin  - Coumadin 7.5mg tonight, trend coags   - DC planning once INR therapeutic     #Derm  - Cont. Prednisone 3 week hayde for presumed poison ivy   - F/U Derm recs       Holli Knight CCU NP  Beeper #[id number]  Spectra # [phone] ====================  CCU MIDNIGHT ROUNDS  ====================    CAMILLA STILL  [id number]    Patient is a 54y old  Male who presents with a chief complaint of NSTEMI (12 Aug 2019 12:40)    ====================  SUMMARY: 54M Chinese speaking current smoker, no known PMH 2/2 not seeing a doctor for many yrs who presented to [place] w/ chest +back pain, elevated BPs and elevated troponin, transferred to [facility] for mgmt of HTN emergency w/ NSTEMI, now s/p LHC 8/10 showing 100%occ to mLAD s/p RADHA x1.  ====================    ====================  NEW EVENTS: None  ====================    MEDICATIONS  (STANDING):  aspirin  chewable 81 milliGRAM(s) Oral daily  atorvastatin 40 milliGRAM(s) Oral at bedtime  chlorhexidine 2% Cloths 1 Application(s) Topical at bedtime  clobetasol 0.05% Ointment 1 Application(s) Topical two times a day  clopidogrel Tablet 75 milliGRAM(s) Oral daily  heparin  Infusion.  Unit(s)/Hr (14 mL/Hr) IV Continuous <Continuous>  lisinopril 10 milliGRAM(s) Oral daily  metoprolol succinate ER 50 milliGRAM(s) Oral daily  predniSONE   Tablet 40 milliGRAM(s) Oral daily  warfarin 7.5 milliGRAM(s) Oral once    MEDICATIONS  (PRN):  acetaminophen   Tablet .. 650 milliGRAM(s) Oral every 6 hours PRN Temp greater or equal to 38C (100.4F), Mild Pain (1 - 3), Moderate Pain (4 - 6), Severe Pain (7 - 10)  calamine Lotion 1 Application(s) Topical three times a day PRN Itching  heparin  Injectable 6500 Unit(s) IV Push every 6 hours PRN For aPTT less than 40  heparin  Injectable 3000 Unit(s) IV Push every 6 hours PRN For aPTT between 40 - 57    ====================  VITALS (Last 12 hrs):  ====================  T(C): 36.8 (08-12-19 @ 19:00), Max: 36.8 (08-12-19 @ 19:00)  T(F): 98.3 (08-12-19 @ 19:00), Max: 98.3 (08-12-19 @ 19:00)  HR: 64 (08-12-19 @ 20:00) (62 - 82)  BP: 137/78 (08-12-19 @ 20:00) (112/71 - 137/78)  BP(mean): 101 (08-12-19 @ 20:00) (87 - 109)  RR: 19 (08-12-19 @ 20:00) (16 - 23)  SpO2: 96% (08-12-19 @ 16:00) (92% - 98%)        I&O's Summary    11 Aug 2019 07:01  -  12 Aug 2019 07:00  --------------------------------------------------------  IN: 720 mL / OUT: 825 mL / NET: -105 mL    12 Aug 2019 07:01  -  12 Aug 2019 20:44  --------------------------------------------------------  IN: 596 mL / OUT: 900 mL / NET: -304 mL        ====================  NEW LABS:  ====================  08-12    134<L>  |  101  |  23  ----------------------------<  154<H>  4.8   |  20<L>  |  0.84    Ca    9.1      12 Aug 2019 04:18  Phos  4.5     08-12  Mg     2.5     08-12    TPro  7.4  /  Alb  3.8  /  TBili  0.3  /  DBili  x   /  AST  178<H>  /  ALT  233<H>  /  AlkPhos  73  08-12    PT/INR - ( 11 Aug 2019 05:21 )   PT: 13.9 sec;   INR: 1.20 ratio      PTT - ( 12 Aug 2019 17:35 )  PTT:52.4 sec  ====================  PLAN:  ====================  #NSTEMI s/p RADHA to mLAD  - Cont. DAPT + High intensity statin; monitor LFTs   - Currently on triple therapy, stop ASA outpatient in 1 month   - Cont. Toprol XL 50mg, Lisinopril 10mg; titrate up if BP tolerates      #Chronic systolic HF; EF 25%  - Euvolemic on exam; c/w BB/ACE-i   - No lifevest due to insurance coverage inability      #LV Thrombus   - Heparin gtt bridge to Coumadin  - Coumadin 7.5mg tonight, trend coags   - DC planning once INR therapeutic     #Derm  - Cont. Prednisone 3 week hayde for presumed poison ivy   - F/U Derm recs       Holli Knight CCU NP  Beeper #[id number]  Spectra # [phone]
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
## 2019-08-12 NOTE — PROGRESS NOTE ADULT - ASSESSMENT
54M [place] speaking current smoker, no known PMH 2/2 not seeing a doctor for many yrs who presented to [facility] w/ chest +back pain, elevated BPs and elevated troponin, transferred to [facility] for mgmt of HTN emergency w/ NSTEMI, now s/p LHC 8/10 showing 100%occ to mLAD s/p RADHA x1.      #Neuro  -mentating at baseline, A0 x 4    #Pulm  - satting well on RA  - CTA Chest at OSH unremarkable for PE but did show lung nodule  - pt will need 12 mth repeat CT chest as oupt for f/u nodule    #CV  NSTEMI  Linn score: 74 (1.8% prob of death < 6mths)  ANTWAN score: 2 (8% risk in 14d)  --now s/p LHC on 8/10 showing 100% occlusion to mLAD (thought to be chronic) w/ 1 RADHA to mLAD via R fem  -found to have elevated troponins to 506 -> 1176  -CK peaked at 208 ->183, will no longer trend  -CTA chest at OSH neg for acute dissection  -TTE w/ LV thrombus - EF 25%  -c/w asa 81mg, brilinta switched to plavix  -Statin decreased to 40 lipitor (uptrending LFTs)  -monitor BPs    Hypertensive Emergency  -presented to OSH w/ BP 170s/110s w/ concurrent ACS  -now weaned off nitro gtt  -started on isordil 10 TID, metoprolol 25 BID, and hydral 10 TID - wean isordil as tolerated  - Lisinopril 5mg started  -monitor BPs; goal <160/110 over 24hrs from presentation    #GI  -Diet: DASH  -found to have transaminitis - lowered dose of lipitor  -possibly elevated in the setting of NSTEMI/HTN emergency v statin  -will continue to trend  -no abd pain    #Derm  -pt w/ diffuse pruritic weeping rash in the s/o of recent outing in the woods, c/w poison ivy. interdigital vesicles noted, though given history, lower susp for scabies justin  -will administer calamine lotion + topical corticosteroids (clobetasol ointment BID) for symptom control  -holding oral prednisone given pt is on antiplatelets w/ recent hep gtt, would prefer to avoid prolongued healing of fem cath site  -will hold off on bactrim as arm does not appear to be superinfected justin  -derm consulted for assistance with diagnosis and treatment, will f/u recs    #Heme  -Hgb stable, no signs of acute bleed  -DAP and eliquis    #Endo  -HbA1c and lipid panel in AM  -no active issues    #Renal  -Cr 0.84  -continue to monitor  -replete lytes as necessary    #ID:  -leukocytosis to 18.3--> now 9.3, afebrile. possible 2/2 recent prednisone use, lower susp for systemic infection  -blood cx's if febrile  -will continue to monitor     #DVT ppx:  -on eliquis for lv thrombus 54M [place] speaking current smoker, no known PMH 2/2 not seeing a doctor for many yrs who presented to [place] w/ chest +back pain, elevated BPs and elevated troponin, transferred to [facility] for mgmt of HTN emergency w/ NSTEMI, now s/p LHC 8/10 showing 100%occ to mLAD s/p RADHA x1.      #Neuro  -mentating at baseline, A0 x 4    #Pulm  - satting well on RA  - CTA Chest at OSH unremarkable for PE but did show lung nodule  - pt will need 12 mth repeat CT chest as oupt for f/u nodule    #CV  NSTEMI  Linn score: 74 (1.8% prob of death < 6mths)  ANTWAN score: 2 (8% risk in 14d)  --now s/p LHC on 8/10 showing 100% occlusion to mLAD (thought to be chronic) w/ 1 RADHA to mLAD via R fem  -found to have elevated troponins to 506 -> 1176  -CK peaked at 208 ->183, will no longer trend  -CTA chest at OSH neg for acute dissection  -TTE w/ LV thrombus - EF 25% Heparin gtt bridge to coumadin; As per LSCW Patient reports that he does not have a green card and is not a [place] citizen. NS  finance office following for Emergency Medicaid application. Patient is not covered for [employer]  understands the limitations of coverage with Emergency Medicaid.   -c/w asa 81mg, brilinta switched to plavix  -Statin decreased to 40 lipitor (uptrending LFTs)  -monitor BPs    Hypertensive Emergency  -presented to OSH w/ BP 170s/110s w/ concurrent ACS  -now weaned off nitro gtt  -change Toprol XL 50mg, stopped hydral 10 TID   -increased to Lisinopril 10mg with parameters  -monitor BPs; goal <160/110 over 24hrs from presentation    #GI  -Diet: DASH  -found to have transaminitis - lowered dose of lipitor  -possibly elevated in the setting of NSTEMI / HTN emergency v statin  -will continue to trend  -no abd pain    #Derm  -pt w/ diffuse pruritic weeping rash in the s/o of recent outing in the woods, c/w poison ivy. interdigital vesicles noted, though given history, lower susp for scabies justin  -will administer calamine lotion + topical corticosteroids (clobetasol ointment BID) for symptom control  -holding oral prednisone given pt is on antiplatelets w/ recent hep gtt, would prefer to avoid prolongued healing of fem cath site  -will hold off on bactrim as arm does not appear to be superinfected justin  -derm consulted for assistance with diagnosis and treatment, will f/u recs    #Heme  -Hgb stable, no signs of acute bleed  -DAP and eliquis    #Endo  -HbA1c and lipid panel in AM  -no active issues    #Renal  -Cr 0.84  -continue to monitor  -replete lytes as necessary    #ID:  -leukocytosis to 18.3--> now 9.3, afebrile. possible 2/2 recent prednisone use, lower susp for systemic infection  -blood cx's if febrile  -will continue to monitor     #DVT ppx:  -on eliquis for lv thrombus

## 2019-08-12 NOTE — CONSULT NOTE ADULT - ASSESSMENT
#Contact Dermatitis- likely secondary to poison ivy. Anticoagulation is making scratched and traumatized areas appear purpuric. Rash can continue to spread up to 3 weeks after exposure     -Continue with clobetasol ointment BID to the AA, not to apply in the underarms or groin or face  -Dermatology to sign off on this patient. Please re-consult for further questions or concerns.

## 2019-08-13 DIAGNOSIS — I21.02 ST ELEVATION (STEMI) MYOCARDIAL INFARCTION INVOLVING LEFT ANTERIOR DESCENDING CORONARY ARTERY: ICD-10-CM

## 2019-08-13 DIAGNOSIS — L23.7 ALLERGIC CONTACT DERMATITIS DUE TO PLANTS, EXCEPT FOOD: ICD-10-CM

## 2019-08-13 DIAGNOSIS — I16.0 HYPERTENSIVE URGENCY: ICD-10-CM

## 2019-08-13 DIAGNOSIS — I51.9 HEART DISEASE, UNSPECIFIED: ICD-10-CM

## 2019-08-13 DIAGNOSIS — I21.29 ST ELEVATION (STEMI) MYOCARDIAL INFARCTION INVOLVING OTHER SITES: ICD-10-CM

## 2019-08-13 LAB
ALBUMIN SERPL ELPH-MCNC: 3.5 G/DL — SIGNIFICANT CHANGE UP (ref 3.3–5)
ALP SERPL-CCNC: 65 U/L — SIGNIFICANT CHANGE UP (ref 40–120)
ALT FLD-CCNC: 171 U/L — HIGH (ref 10–45)
ANION GAP SERPL CALC-SCNC: 13 MMOL/L — SIGNIFICANT CHANGE UP (ref 5–17)
APTT BLD: 73.3 SEC — HIGH (ref 27.5–36.3)
APTT BLD: 82.4 SEC — HIGH (ref 27.5–36.3)
AST SERPL-CCNC: 87 U/L — HIGH (ref 10–40)
BILIRUB SERPL-MCNC: 0.2 MG/DL — SIGNIFICANT CHANGE UP (ref 0.2–1.2)
BUN SERPL-MCNC: 22 MG/DL — SIGNIFICANT CHANGE UP (ref 7–23)
CALCIUM SERPL-MCNC: 8.9 MG/DL — SIGNIFICANT CHANGE UP (ref 8.4–10.5)
CHLORIDE SERPL-SCNC: 102 MMOL/L — SIGNIFICANT CHANGE UP (ref 96–108)
CO2 SERPL-SCNC: 19 MMOL/L — LOW (ref 22–31)
CREAT SERPL-MCNC: 0.92 MG/DL — SIGNIFICANT CHANGE UP (ref 0.5–1.3)
GLUCOSE SERPL-MCNC: 170 MG/DL — HIGH (ref 70–99)
HCT VFR BLD CALC: 39 % — SIGNIFICANT CHANGE UP (ref 39–50)
HGB BLD-MCNC: 12.3 G/DL — LOW (ref 13–17)
INR BLD: 1.22 RATIO — HIGH (ref 0.88–1.16)
MAGNESIUM SERPL-MCNC: 2.2 MG/DL — SIGNIFICANT CHANGE UP (ref 1.6–2.6)
MCHC RBC-ENTMCNC: 28.5 PG — SIGNIFICANT CHANGE UP (ref 27–34)
MCHC RBC-ENTMCNC: 31.5 GM/DL — LOW (ref 32–36)
MCV RBC AUTO: 90.5 FL — SIGNIFICANT CHANGE UP (ref 80–100)
PHOSPHATE SERPL-MCNC: 2.9 MG/DL — SIGNIFICANT CHANGE UP (ref 2.5–4.5)
PLATELET # BLD AUTO: 225 K/UL — SIGNIFICANT CHANGE UP (ref 150–400)
POTASSIUM SERPL-MCNC: 4.1 MMOL/L — SIGNIFICANT CHANGE UP (ref 3.5–5.3)
POTASSIUM SERPL-SCNC: 4.1 MMOL/L — SIGNIFICANT CHANGE UP (ref 3.5–5.3)
PROT SERPL-MCNC: 6.9 G/DL — SIGNIFICANT CHANGE UP (ref 6–8.3)
PROTHROM AB SERPL-ACNC: 14.1 SEC — HIGH (ref 10–12.9)
RBC # BLD: 4.31 M/UL — SIGNIFICANT CHANGE UP (ref 4.2–5.8)
RBC # FLD: 11.9 % — SIGNIFICANT CHANGE UP (ref 10.3–14.5)
SODIUM SERPL-SCNC: 134 MMOL/L — LOW (ref 135–145)
WBC # BLD: 12 K/UL — HIGH (ref 3.8–10.5)
WBC # FLD AUTO: 12 K/UL — HIGH (ref 3.8–10.5)

## 2019-08-13 PROCEDURE — 99233 SBSQ HOSP IP/OBS HIGH 50: CPT | Mod: GC

## 2019-08-13 PROCEDURE — 99223 1ST HOSP IP/OBS HIGH 75: CPT

## 2019-08-13 PROCEDURE — 93010 ELECTROCARDIOGRAM REPORT: CPT

## 2019-08-13 RX ORDER — WARFARIN SODIUM 2.5 MG/1
7.5 TABLET ORAL AT BEDTIME
Refills: 0 | Status: DISCONTINUED | OUTPATIENT
Start: 2019-08-13 | End: 2019-08-14

## 2019-08-13 RX ORDER — LISINOPRIL 2.5 MG/1
20 TABLET ORAL DAILY
Refills: 0 | Status: DISCONTINUED | OUTPATIENT
Start: 2019-08-13 | End: 2019-08-20

## 2019-08-13 RX ORDER — ATORVASTATIN CALCIUM 80 MG/1
80 TABLET, FILM COATED ORAL AT BEDTIME
Refills: 0 | Status: DISCONTINUED | OUTPATIENT
Start: 2019-08-13 | End: 2019-08-20

## 2019-08-13 RX ORDER — METOPROLOL TARTRATE 50 MG
75 TABLET ORAL DAILY
Refills: 0 | Status: DISCONTINUED | OUTPATIENT
Start: 2019-08-14 | End: 2019-08-20

## 2019-08-13 RX ORDER — METOPROLOL TARTRATE 50 MG
25 TABLET ORAL ONCE
Refills: 0 | Status: COMPLETED | OUTPATIENT
Start: 2019-08-13 | End: 2019-08-13

## 2019-08-13 RX ADMIN — ATORVASTATIN CALCIUM 80 MILLIGRAM(S): 80 TABLET, FILM COATED ORAL at 21:07

## 2019-08-13 RX ADMIN — CHLORHEXIDINE GLUCONATE 1 APPLICATION(S): 213 SOLUTION TOPICAL at 21:07

## 2019-08-13 RX ADMIN — Medication 650 MILLIGRAM(S): at 11:04

## 2019-08-13 RX ADMIN — HEPARIN SODIUM 1600 UNIT(S)/HR: 5000 INJECTION INTRAVENOUS; SUBCUTANEOUS at 02:00

## 2019-08-13 RX ADMIN — Medication 650 MILLIGRAM(S): at 00:30

## 2019-08-13 RX ADMIN — HEPARIN SODIUM 1600 UNIT(S)/HR: 5000 INJECTION INTRAVENOUS; SUBCUTANEOUS at 09:31

## 2019-08-13 RX ADMIN — Medication 1 APPLICATION(S): at 05:13

## 2019-08-13 RX ADMIN — Medication 81 MILLIGRAM(S): at 11:05

## 2019-08-13 RX ADMIN — Medication 25 MILLIGRAM(S): at 16:48

## 2019-08-13 RX ADMIN — WARFARIN SODIUM 7.5 MILLIGRAM(S): 2.5 TABLET ORAL at 21:07

## 2019-08-13 RX ADMIN — Medication 1 APPLICATION(S): at 16:48

## 2019-08-13 RX ADMIN — CLOPIDOGREL BISULFATE 75 MILLIGRAM(S): 75 TABLET, FILM COATED ORAL at 11:05

## 2019-08-13 RX ADMIN — Medication 650 MILLIGRAM(S): at 11:34

## 2019-08-13 RX ADMIN — Medication 50 MILLIGRAM(S): at 05:14

## 2019-08-13 RX ADMIN — LISINOPRIL 10 MILLIGRAM(S): 2.5 TABLET ORAL at 05:14

## 2019-08-13 RX ADMIN — Medication 40 MILLIGRAM(S): at 05:13

## 2019-08-13 NOTE — CONSULT NOTE ADULT - SUBJECTIVE AND OBJECTIVE BOX
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 deployed at amaximum inflation pressure  of 16 justin. Balloon angioplasty was performed, using a 3.50 X 20 NC APEX  balloon, with 3 inflations and a maximum inflation pressure of 16 justin.  CONTRAST GIVEN: Omnipaque 28 ml. Omnipaque 75 ml.  MEDICATIONS GIVEN: Fentanyl, 25 mcg, IV. Midazolam, 1 mg, IV. Heparin, 7000  units, IV. Aspirin, 81 mg, PO. Ticagrelor, 90 mg, PO.  VENTRICLES: EF estimated was 25 %.  CORONARY VESSELS: The coronary circulation is right dominant.  LM:   --  LM: Normal.  LAD:   --  Proximal LAD: There was a 100 % stenosis.  CX:   --  Circumflex: Angiography showed minor luminal irregularities with  no flow limiting lesions.  RCA:   --  RCA: Angiography showed minor luminal irregularities with no  flow limiting lesions.  COMPLICATIONS: Therewere no complications.  DIAGNOSTIC RECOMMENDATIONS:  1. Successful PCI to the mLAD with a 3.0mm RADHA post-dilated to 3.5mm in the  setting of a STEMI.  2. DAPT.  INTERVENTIONAL RECOMMENDATIONS:  1. Successful PCI to the mLAD with a 3.0mm RADHA post-dilated to 3.5mm in the  setting of a STEMI.  2. DAPT.  Prepared and signed by  Shahid Rodriguez M.D.

## 2019-08-13 NOTE — DIETITIAN INITIAL EVALUATION ADULT. - PERTINENT LABORATORY DATA
8/10: Cholesterol 175, TG 88, HDL 50, , A1C 5.6%, 8/13: sodium 134, glucose 170- currently on steroids

## 2019-08-13 NOTE — DIETITIAN INITIAL EVALUATION ADULT. - REASON INDICATOR FOR ASSESSMENT
Pt seen for LOS in CCU2  Source: pt, [place] : #[id number]    Pt presented to OSH c CP, transferred to [facility] with increased troponin, pt c NSTEMI, S/P LHC, 100% occlusion to mLAD, S/P RADHA x 1.

## 2019-08-13 NOTE — CHART NOTE - NSCHARTNOTEFT_GEN_A_CORE
====================  CCU MIDNIGHT ROUNDS  ====================    CAMILLA STILL  [id number]    ====================  SUMMARY: HPI:  54M tobacco user w/ no known PMH (has not seen a doctor in many years) who presented to [place] with chest pain, noted to have elevated troponin and transferred to [facility]. Pt reports his symptoms began around noon while eating lunch. He reports 8/10 chest pain/pressure that radiated down his L arm and to his back. He felt short of breath during the episode. He went to the outside hospital where he received sublingual nitro and his symptoms improved. At this time his chest pain is 2-3/10 and he feels much improved. The pt denies any recent chest pain prior to today. No syncope, dyspnea, peripheral edema.     Of note, pt was recently in the woods/garden by a friends house 3d prior. States he had multiple mosquito bites all over his arm and exposure to bushes, does not know whether they looked like poison ivy but recalls that he was wearing a short-sleeved shirt and first noticed the rash on his b/l forearms. Afterwards, developed rash on his legs. The rash is itchy, although less now compared to before. Pt went to a doctor (?[facility]), where he was told he has poison ivy, was Rx'd a steroid taper (took 50mg x1 yesterday only), hydroxyzine PRN and bactrim. Has also been taking his friend's halobetasol which has helped.    At OSH,  /144. Pt was started on heparin and nitro GTT, received ASA/brilinta load, transferred to [facility] for further eval. probnp 816. Trop I 1.420.    ED Course:  /104  HR 94  RR 20 T 98.6F 97%02  In the ED, pt was resumed on heparin and nitro GTT. Transferred to CCU for monitoring of NSTEMI.   Currently denies CP, SOB or palpitations. Has some headaches after starting nitro. (10 Aug 2019 02:09)    ====================        ====================  NEW EVENTS:  Coumadin 7.5mg tonight.   No complaints.  ====================        ====================  VITALS (Last 12 hrs):  ====================    T(C): 37.1 (08-13-19 @ 19:00), Max: 37.1 (08-13-19 @ 15:00)  HR: 65 (08-13-19 @ 23:00) (59 - 81)  BP: 163/103 (08-13-19 @ 23:00) (126/76 - 163/103)  BP(mean): 128 (08-13-19 @ 23:00) (95 - 132)  RR: 24 (08-13-19 @ 23:00) (16 - 29)  SpO2: 98% (08-13-19 @ 17:00) (98% - 98%)      I&O's Summary    12 Aug 2019 07:01  -  13 Aug 2019 07:00  --------------------------------------------------------  IN: 1372 mL / OUT: 2000 mL / NET: -628 mL    13 Aug 2019 07:01  -  13 Aug 2019 23:33  --------------------------------------------------------  IN: 1156 mL / OUT: 1400 mL / NET: -244 mL        ====================  PLAN:  # AWSTEMI, Apical thrombus  - s/p RADHA x1 to LAD  - c/w DAPT, statin, BB, ACE ; uptitrate as tolerated  - EF is 25%, pt does not have insurance to cover lifevest  - c/w heparin-coumadin bridge  - daily INR checks  - monitor ectopy on tele  ====================    HEALTH ISSUES - PROBLEM Dx:  Contact dermatitis due to poison ivy: Contact dermatitis due to poison ivy  Hypertensive urgency: Hypertensive urgency  Systolic dysfunction: Systolic dysfunction  Left ventricular thrombus with acute MI: Left ventricular thrombus with acute MI  STEMI involving left anterior descending coronary artery: STEMI involving left anterior descending coronary artery        Karley Rosario, CCU PA #[id number]

## 2019-08-13 NOTE — DIETITIAN INITIAL EVALUATION ADULT. - OTHER INFO
Diet PTA: regular, reports he works as a  and cooks 2 meals daily for himself- after eating in house, feels he was using too much salt in his cooking. Reports he does not velasco or use butter in cooking. States he does eat some fruits and vegetables, diet tends to be heavier in meat. Reports he does not eat pork due to Jewish reasons.     Reports NKFA. States no micronutrient coverage at home.     Reports wt has been stable, can be anywhere from 165 to 187 pounds, c/w current admit weights of 165.1->174.6 pounds.     Pt agreeable to verbal education regarding Coumadin and DASH diet using  and agreeable to written material in English which he will share with brother. Went over [facility] "Taking Warfarin Safely" booklet. Discussed interaction between vitamin K and Coumadin, discussed point system and keeping vitamin K intake consistent. RD provided verbal and written material regarding Heart Healthy Nutrition Therapy and ways to cut down on salt and fat intake and reviewed healthy sources of fat.

## 2019-08-13 NOTE — CONSULT NOTE ADULT - ASSESSMENT
54 year old male, active smoker with no known PMH transferred from [facility] with chest pain found to have AWSTEMI. [facility] 8/10 w/ 100% stenosis in proximal LAD now s/p 1 RADHA to pLAD c/b newly found EF of 25% and LV apical thrombus. EPS consulted for 23 beats NSVT noted on telemetry.    NSVT   -In the setting of post RADHA to pLAD with EF 25%  -Agree with increasing Toprol XL to 75mg QD  -Would max. on ACEI as BP can tolerates   -Continue with coumadin/heparin bridge for LV apical thrombus  -Continue to monitor telemetry for 48 hours     [id number]

## 2019-08-13 NOTE — DIETITIAN INITIAL EVALUATION ADULT. - PHYSICAL APPEARANCE
well nourished/other (specify) Skin: intact  Edema: none at this time  ht: 5'6", wt: 165.1pounds, BMI: 26.6kg/m2, IBW: 142pounds +/- 10%

## 2019-08-13 NOTE — CONSULT NOTE ADULT - ATTENDING COMMENTS
I personally interviewed the patient (via ) and examined him. He had a 23 beat run of VT at >200 bpm late after his myocardial infarction. He has severe LV dysfunction with late revascularization making recovery of LV function less likely. Hence, I think it would be reasonable to perform an EP study of good dose of beta blockers. If he is inducible for a specific monomorphic VT, he would be a candidate for an ICD implant without waiting the traditional 90 days. However, if he has a negative EPS, continue beta blockers with reassessment of LV function in 3 months would be appropriate.

## 2019-08-13 NOTE — PROGRESS NOTE ADULT - ASSESSMENT
53 y/o male, current smoker, admitted for AWSTEMI s/p 1 RADHA to pLAD c/b newly found EF of 25% and LV thrombus. Currentky anticoagulated on heparin drip to coumadin transition.

## 2019-08-13 NOTE — PROGRESS NOTE ADULT - SUBJECTIVE AND OBJECTIVE BOX
Patient is a 54y old  Male who presents with a chief complaint of NSTEMI (12 Aug 2019 20:44)      Overnight Event:    REVIEW OF SYSTEMS:  	    MEDICATIONS  (STANDING):  aspirin  chewable 81 milliGRAM(s) Oral daily  atorvastatin 40 milliGRAM(s) Oral at bedtime  chlorhexidine 2% Cloths 1 Application(s) Topical at bedtime  clobetasol 0.05% Ointment 1 Application(s) Topical two times a day  clopidogrel Tablet 75 milliGRAM(s) Oral daily  heparin  Infusion.  Unit(s)/Hr (14 mL/Hr) IV Continuous <Continuous>  lisinopril 10 milliGRAM(s) Oral daily  metoprolol succinate ER 50 milliGRAM(s) Oral daily  predniSONE   Tablet 40 milliGRAM(s) Oral daily  warfarin 7.5 milliGRAM(s) Oral at bedtime    MEDICATIONS  (PRN):  acetaminophen   Tablet .. 650 milliGRAM(s) Oral every 6 hours PRN Temp greater or equal to 38C (100.4F), Mild Pain (1 - 3), Moderate Pain (4 - 6), Severe Pain (7 - 10)  calamine Lotion 1 Application(s) Topical three times a day PRN Itching  heparin  Injectable 6500 Unit(s) IV Push every 6 hours PRN For aPTT less than 40  heparin  Injectable 3000 Unit(s) IV Push every 6 hours PRN For aPTT between 40 - 57        PHYSICAL EXAM:  Vital Signs Last 24 Hrs  T(C): 36.9 (13 Aug 2019 05:05), Max: 36.9 (13 Aug 2019 01:00)  T(F): 98.4 (13 Aug 2019 05:05), Max: 98.4 (13 Aug 2019 01:00)  HR: 65 (13 Aug 2019 06:00) (57 - 82)  BP: 143/93 (13 Aug 2019 06:00) (112/71 - 158/101)  BP(mean): 114 (13 Aug 2019 06:00) (87 - 124)  RR: 22 (13 Aug 2019 06:00) (13 - 24)  SpO2: 96% (12 Aug 2019 16:00) (92% - 98%)  I&O's Summary    12 Aug 2019 07:01  -  13 Aug 2019 07:00  --------------------------------------------------------  IN: 1372 mL / OUT: 2000 mL / NET: -628 mL        Appearance: Normal	  HEENT:   Normal oral mucosa, PERRL, EOMI	  Lymphatic: No lymphadenopathy  Cardiovascular: Normal S1 S2, No JVD, No murmurs, No edema  Respiratory: Lungs clear to auscultation	  Psychiatry: A & O x 3, Mood & affect appropriate  Gastrointestinal:  Soft, Non-tender, + BS	  Skin: No rashes, No ecchymoses, No cyanosis	  Neurologic: Non-focal  Extremities: Normal range of motion, No clubbing, cyanosis or edema  Vascular: Peripheral pulses palpable 2+ bilaterally    LABS:	 	                        12.3   12.0  )-----------( 225      ( 13 Aug 2019 01:28 )             39.0     Auto Eosinophil # x     / Auto Eosinophil % x     / Auto Neutrophil # x     / Auto Neutrophil % x     / BANDS % x                            13.1   13.8  )-----------( 273      ( 12 Aug 2019 17:35 )             40.7     Auto Eosinophil # x     / Auto Eosinophil % x     / Auto Neutrophil # x     / Auto Neutrophil % x     / BANDS % x                            13.6   9.5   )-----------( 225      ( 12 Aug 2019 04:18 )             41.8     Auto Eosinophil # x     / Auto Eosinophil % x     / Auto Neutrophil # x     / Auto Neutrophil % x     / BANDS % x        INR: 1.22 ratio (08-13 @ 01:28)  INR: 1.25 ratio (08-12 @ 17:35)  INR: 1.20 ratio (08-11 @ 05:21)    08-13    134<L>  |  102  |  22  ----------------------------<  170<H>  4.1   |  19<L>  |  0.92  08-12    134<L>  |  101  |  23  ----------------------------<  154<H>  4.8   |  20<L>  |  0.84    Ca    8.9      13 Aug 2019 01:28  Mg     2.2     08-13  Phos  2.9     08-13  TPro  6.9  /  Alb  3.5  /  TBili  0.2  /  DBili  x   /  AST  87<H>  /  ALT  171<H>  /  AlkPhos  65  08-13  TPro  7.4  /  Alb  3.8  /  TBili  0.3  /  DBili  x   /  AST  178<H>  /  ALT  233<H>  /  AlkPhos  73  08-12        proBNP:   Lipid Profile: 08-10 Chol 175 <H> HDL 50 Trig 88  HgA1c: 5.6 % (08-10 @ 10:21)    TSH: Thyroid Stimulating Hormone, Serum: 5.66 uIU/mL (08-10 @ 10:06)      CARDIAC MARKERS:   10 Aug 2019 12:45 Troponin x     / Creatine Kinase 171 U/L /  CKMB 14.1 ng/mL / CPK Mass Assay % 8.2 %   10 Aug 2019 06:20 Troponin x     / Creatine Kinase 183 U/L /  CKMB 18.5 ng/mL / CPK Mass Assay % 10.1 %   10 Aug 2019 00:50 Troponin x     / Creatine Kinase 208 U/L /  CKMB 18.6 ng/mL / CPK Mass Assay % 8.9 %        TELEMETRY: 	    ECG:  	  RADIOLOGY:  OTHER: 	    PREVIOUS DIAGNOSTIC TESTING:    [ ] Echocardiogram:  [ ]  Catheterization:  [ ] Stress Test:  	  	  JOSEPH Berrios  Contact # Patient is a 54y old  Male who presents with a chief complaint of NSTEMI (12 Aug 2019 20:44)    HPI:  54M tobacco user w/ no known PMH (has not seen a doctor in many years) who presented to [place] with chest pain, noted to have elevated troponin and transferred to [facility]. Pt reports his symptoms began around noon while eating lunch. He reports 8/10 chest pain/pressure that radiated down his L arm and to his back. He felt short of breath during the episode. He went to the outside hospital where he received sublingual nitro and his symptoms improved. At this time his chest pain is 2-3/10 and he feels much improved. The pt denies any recent chest pain prior to today. No syncope, dyspnea, peripheral edema.   Of note, pt was recently in the woods/garden by a friends house 3d prior. States he had multiple mosquito bites all over his arm and exposure to bushes, does not know whether they looked like poison ivy but recalls that he was wearing a short-sleeved shirt and first noticed the rash on his b/l forearms. Afterwards, developed rash on his legs. The rash is itchy, although less now compared to before. Pt went to a doctor (?[facility]), where he was told he has poison ivy, was Rx'd a steroid taper (took 50mg x1 yesterday only), hydroxyzine PRN and bactrim. Has also been taking his friend's halobetasol which has helped.  At OSH,  /144. Pt was started on heparin and nitro GTT, received ASA/brilinta load, transferred to [facility] for further eval. probnp 816. Trop I 1.420.    ED Course:  /104  HR 94  RR 20 T 98.6F 97%02  In the ED, pt was resumed on heparin and nitro GTT. Transferred to CCU for monitoring of NSTEMI.   Currently denies CP, SOB or palpitations. Has some headaches after starting nitro. (10 Aug 2019 02:09)    Overnight Event: no issues overnight, -170's    REVIEW OF SYSTEMS: had intermittent chest discomfort relieved by Tylenol  	    MEDICATIONS  (STANDING):  aspirin  chewable 81 milliGRAM(s) Oral daily  atorvastatin 40 milliGRAM(s) Oral at bedtime  chlorhexidine 2% Cloths 1 Application(s) Topical at bedtime  clobetasol 0.05% Ointment 1 Application(s) Topical two times a day  clopidogrel Tablet 75 milliGRAM(s) Oral daily  heparin  Infusion.  Unit(s)/Hr (14 mL/Hr) IV Continuous <Continuous>  lisinopril 10 milliGRAM(s) Oral daily  metoprolol succinate ER 50 milliGRAM(s) Oral daily  predniSONE   Tablet 40 milliGRAM(s) Oral daily  warfarin 7.5 milliGRAM(s) Oral at bedtime    MEDICATIONS  (PRN):  acetaminophen   Tablet .. 650 milliGRAM(s) Oral every 6 hours PRN Temp greater or equal to 38C (100.4F), Mild Pain (1 - 3), Moderate Pain (4 - 6), Severe Pain (7 - 10)  calamine Lotion 1 Application(s) Topical three times a day PRN Itching  heparin  Injectable 6500 Unit(s) IV Push every 6 hours PRN For aPTT less than 40  heparin  Injectable 3000 Unit(s) IV Push every 6 hours PRN For aPTT between 40 - 57        PHYSICAL EXAM:  Vital Signs Last 24 Hrs  T(C): 36.9 (13 Aug 2019 05:05), Max: 36.9 (13 Aug 2019 01:00)  T(F): 98.4 (13 Aug 2019 05:05), Max: 98.4 (13 Aug 2019 01:00)  HR: 65 (13 Aug 2019 06:00) (57 - 82)  BP: 143/93 (13 Aug 2019 06:00) (112/71 - 158/101)  BP(mean): 114 (13 Aug 2019 06:00) (87 - 124)  RR: 22 (13 Aug 2019 06:00) (13 - 24)  SpO2: 96% (12 Aug 2019 16:00) (92% - 98%)  I&O's Summary    12 Aug 2019 07:01  -  13 Aug 2019 07:00  --------------------------------------------------------  IN: 1372 mL / OUT: 2000 mL / NET: -628 mL      Appearance: Normal	  HEENT:   Normal oral mucosa, PERRL, EOMI	  Cardiovascular: Normal S1 S2, No JVD, No murmurs, No edema  Respiratory: Lungs clear to auscultation	  Psychiatry: A & O x 3, Mood & affect appropriate  Gastrointestinal:  Soft, Non-tender, + BS	  Skin: No rashes, No ecchymoses, No cyanosis	  Neurologic: Non-focal  Extremities: Normal range of motion, No clubbing, cyanosis or edema, erythema rash on arms and chest  Vascular: Peripheral pulses palpable 2+ bilaterally    LABS:	 	                        12.3   12.0  )-----------( 225      ( 13 Aug 2019 01:28 )             39.0     Auto Eosinophil # x     / Auto Eosinophil % x     / Auto Neutrophil # x     / Auto Neutrophil % x     / BANDS % x                            13.1   13.8  )-----------( 273      ( 12 Aug 2019 17:35 )             40.7     Auto Eosinophil # x     / Auto Eosinophil % x     / Auto Neutrophil # x     / Auto Neutrophil % x     / BANDS % x                            13.6   9.5   )-----------( 225      ( 12 Aug 2019 04:18 )             41.8     Auto Eosinophil # x     / Auto Eosinophil % x     / Auto Neutrophil # x     / Auto Neutrophil % x     / BANDS % x        INR: 1.22 ratio (08-13 @ 01:28)  INR: 1.25 ratio (08-12 @ 17:35)  INR: 1.20 ratio (08-11 @ 05:21)    08-13    134<L>  |  102  |  22  ----------------------------<  170<H>  4.1   |  19<L>  |  0.92  08-12    134<L>  |  101  |  23  ----------------------------<  154<H>  4.8   |  20<L>  |  0.84    Ca    8.9      13 Aug 2019 01:28  Mg     2.2     08-13  Phos  2.9     08-13  TPro  6.9  /  Alb  3.5  /  TBili  0.2  /  DBili  x   /  AST  87<H>  /  ALT  171<H>  /  AlkPhos  65  08-13  TPro  7.4  /  Alb  3.8  /  TBili  0.3  /  DBili  x   /  AST  178<H>  /  ALT  233<H>  /  AlkPhos  73  08-12      Lipid Profile: 08-10 Chol 175 <H> HDL 50 Trig 88  HgA1c: 5.6 % (08-10 @ 10:21)    TSH: Thyroid Stimulating Hormone, Serum: 5.66 uIU/mL (08-10 @ 10:06)    CARDIAC MARKERS:   10 Aug 2019 12:45 Troponin x     / Creatine Kinase 171 U/L /  CKMB 14.1 ng/mL / CPK Mass Assay % 8.2 %   10 Aug 2019 06:20 Troponin x     / Creatine Kinase 183 U/L /  CKMB 18.5 ng/mL / CPK Mass Assay % 10.1 %   10 Aug 2019 00:50 Troponin x     / Creatine Kinase 208 U/L /  CKMB 18.6 ng/mL / CPK Mass Assay % 8.9 %      TELEMETRY: 	  no ectopy  ECG:  	Sinus bradycardia, non-specific T wave abnormality  Catheterization: < from: Cardiac Cath Lab - Adult (08.10.19 @ 04:15) >  VENTRICLES: EF estimated was 25 %.  CORONARY VESSELS: The coronary circulation is right dominant.  LM:   --  LM: Normal.  LAD:   --  Proximal LAD: There was a 100 % stenosis.  CX:   --  Circumflex: Angiography showed minor luminal irregularities with  no flow limiting lesions.  RCA:   --  RCA: Angiography showed minor luminal irregularities with no  flow limiting lesions.  COMPLICATIONS: Therewere no complications.  DIAGNOSTIC RECOMMENDATIONS:  1. Successful PCI to the mLAD with a 3.0mm RADHA post-dilated to 3.5mm in the  setting of a STEMI.    < end of copied text >	    PREVIOUS DIAGNOSTIC TESTING:    [ ] Echocardiogram: < from: Transthoracic Echocardiogram (08.10.19 @ 08:07) >  CONCLUSIONS:  1. The left ventricle is mildly dilated.  There is globaly  hypokinesis with minor regional variation.  The LVEF about  25%.  There is a filling defect in the apical septum with  Definity contrast (and suggested on the non-Defiinty image  #59), consistent with a left ventricular apical thrombus.  2. The right ventricle is not well visualized.  On the  parasternal and right ventricular outflow views the  function is normal.  However, not all the walls are well  seen as the apical views are limited.  3. There is no pericardial effusion.  4. There is no significant valvular regurgitation or  stenosis.    < end of copied text >   	  	  ELOY BerriosDignity Health Mercy Gilbert Medical CenterJYOTI  Contact #	[phone]

## 2019-08-14 LAB
ALBUMIN SERPL ELPH-MCNC: 4.1 G/DL — SIGNIFICANT CHANGE UP (ref 3.3–5)
ALP SERPL-CCNC: 75 U/L — SIGNIFICANT CHANGE UP (ref 40–120)
ALT FLD-CCNC: 244 U/L — HIGH (ref 10–45)
ANION GAP SERPL CALC-SCNC: 17 MMOL/L — SIGNIFICANT CHANGE UP (ref 5–17)
APTT BLD: 59.1 SEC — HIGH (ref 27.5–36.3)
AST SERPL-CCNC: 129 U/L — HIGH (ref 10–40)
BILIRUB SERPL-MCNC: 0.4 MG/DL — SIGNIFICANT CHANGE UP (ref 0.2–1.2)
BUN SERPL-MCNC: 23 MG/DL — SIGNIFICANT CHANGE UP (ref 7–23)
CALCIUM SERPL-MCNC: 9.7 MG/DL — SIGNIFICANT CHANGE UP (ref 8.4–10.5)
CHLORIDE SERPL-SCNC: 101 MMOL/L — SIGNIFICANT CHANGE UP (ref 96–108)
CO2 SERPL-SCNC: 19 MMOL/L — LOW (ref 22–31)
CREAT SERPL-MCNC: 1.04 MG/DL — SIGNIFICANT CHANGE UP (ref 0.5–1.3)
GLUCOSE SERPL-MCNC: 100 MG/DL — HIGH (ref 70–99)
HCT VFR BLD CALC: 42.7 % — SIGNIFICANT CHANGE UP (ref 39–50)
HGB BLD-MCNC: 14.1 G/DL — SIGNIFICANT CHANGE UP (ref 13–17)
INR BLD: 1.3 RATIO — HIGH (ref 0.88–1.16)
MAGNESIUM SERPL-MCNC: 2.3 MG/DL — SIGNIFICANT CHANGE UP (ref 1.6–2.6)
MCHC RBC-ENTMCNC: 30 PG — SIGNIFICANT CHANGE UP (ref 27–34)
MCHC RBC-ENTMCNC: 33 GM/DL — SIGNIFICANT CHANGE UP (ref 32–36)
MCV RBC AUTO: 90.7 FL — SIGNIFICANT CHANGE UP (ref 80–100)
PHOSPHATE SERPL-MCNC: 4 MG/DL — SIGNIFICANT CHANGE UP (ref 2.5–4.5)
PLATELET # BLD AUTO: 254 K/UL — SIGNIFICANT CHANGE UP (ref 150–400)
POTASSIUM SERPL-MCNC: 4.3 MMOL/L — SIGNIFICANT CHANGE UP (ref 3.5–5.3)
POTASSIUM SERPL-SCNC: 4.3 MMOL/L — SIGNIFICANT CHANGE UP (ref 3.5–5.3)
PROT SERPL-MCNC: 8.2 G/DL — SIGNIFICANT CHANGE UP (ref 6–8.3)
PROTHROM AB SERPL-ACNC: 14.9 SEC — HIGH (ref 10–12.9)
RBC # BLD: 4.7 M/UL — SIGNIFICANT CHANGE UP (ref 4.2–5.8)
RBC # FLD: 12.1 % — SIGNIFICANT CHANGE UP (ref 10.3–14.5)
SODIUM SERPL-SCNC: 137 MMOL/L — SIGNIFICANT CHANGE UP (ref 135–145)
WBC # BLD: 13.6 K/UL — HIGH (ref 3.8–10.5)
WBC # FLD AUTO: 13.6 K/UL — HIGH (ref 3.8–10.5)

## 2019-08-14 PROCEDURE — 99233 SBSQ HOSP IP/OBS HIGH 50: CPT | Mod: GC

## 2019-08-14 PROCEDURE — 93010 ELECTROCARDIOGRAM REPORT: CPT

## 2019-08-14 RX ORDER — WARFARIN SODIUM 2.5 MG/1
10 TABLET ORAL ONCE
Refills: 0 | Status: COMPLETED | OUTPATIENT
Start: 2019-08-14 | End: 2019-08-14

## 2019-08-14 RX ADMIN — Medication 1 APPLICATION(S): at 17:48

## 2019-08-14 RX ADMIN — LISINOPRIL 20 MILLIGRAM(S): 2.5 TABLET ORAL at 05:23

## 2019-08-14 RX ADMIN — CLOPIDOGREL BISULFATE 75 MILLIGRAM(S): 75 TABLET, FILM COATED ORAL at 11:21

## 2019-08-14 RX ADMIN — WARFARIN SODIUM 10 MILLIGRAM(S): 2.5 TABLET ORAL at 21:44

## 2019-08-14 RX ADMIN — Medication 1 APPLICATION(S): at 05:20

## 2019-08-14 RX ADMIN — Medication 81 MILLIGRAM(S): at 11:21

## 2019-08-14 RX ADMIN — ATORVASTATIN CALCIUM 80 MILLIGRAM(S): 80 TABLET, FILM COATED ORAL at 21:44

## 2019-08-14 RX ADMIN — Medication 40 MILLIGRAM(S): at 05:20

## 2019-08-14 RX ADMIN — Medication 75 MILLIGRAM(S): at 05:20

## 2019-08-14 RX ADMIN — CHLORHEXIDINE GLUCONATE 1 APPLICATION(S): 213 SOLUTION TOPICAL at 21:45

## 2019-08-14 RX ADMIN — HEPARIN SODIUM 1600 UNIT(S)/HR: 5000 INJECTION INTRAVENOUS; SUBCUTANEOUS at 07:35

## 2019-08-14 NOTE — PROGRESS NOTE ADULT - PROBLEM SELECTOR PLAN 2
continue Heparin to Coumadin bridge continue ASA and Plavix  continue atorvastatin 80mg QHS   continue Toprol XL 75mg  continue Lisinopril 20mg

## 2019-08-14 NOTE — PROGRESS NOTE ADULT - SUBJECTIVE AND OBJECTIVE BOX
Patient is a 54y old  Male who presents with a chief complaint of NSTEMI (13 Aug 2019 15:40)    HPI:  54M tobacco user w/ no known PMH (has not seen a doctor in many years) who presented to [place] with chest pain, noted to have elevated troponin and transferred to [facility]. Pt reports his symptoms began around noon while eating lunch. He reports 8/10 chest pain/pressure that radiated down his L arm and to his back. He felt short of breath during the episode. He went to the outside hospital where he received sublingual nitro and his symptoms improved. At this time his chest pain is 2-3/10 and he feels much improved. The pt denies any recent chest pain prior to today. No syncope, dyspnea, peripheral edema.   Of note, pt was recently in the woods/garden by a friends house 3d prior. States he had multiple mosquito bites all over his arm and exposure to bushes, does not know whether they looked like poison ivy but recalls that he was wearing a short-sleeved shirt and first noticed the rash on his b/l forearms. Afterwards, developed rash on his legs. The rash is itchy, although less now compared to before. Pt went to a doctor (?[facility]), where he was told he has poison ivy, was Rx'd a steroid taper (took 50mg x1 yesterday only), hydroxyzine PRN and bactrim. Has also been taking his friend's halobetasol which has helped.  At OSH,  /144. Pt was started on heparin and nitro GTT, received ASA/brilinta load, transferred to [facility] for further eval. probnp 816. Trop I 1.420.    ED Course:  /104  HR 94  RR 20 T 98.6F 97%02  In the ED, pt was resumed on heparin and nitro GTT. Transferred to CCU for monitoring of NSTEMI.   Currently denies CP, SOB or palpitations. Has some headaches after starting nitro. (10 Aug 2019 02:09)    Overnight Event:    REVIEW OF SYSTEMS:  	    MEDICATIONS  (STANDING):  aspirin  chewable 81 milliGRAM(s) Oral daily  atorvastatin 80 milliGRAM(s) Oral at bedtime  chlorhexidine 2% Cloths 1 Application(s) Topical at bedtime  clobetasol 0.05% Ointment 1 Application(s) Topical two times a day  clopidogrel Tablet 75 milliGRAM(s) Oral daily  heparin  Infusion.  Unit(s)/Hr (14 mL/Hr) IV Continuous <Continuous>  lisinopril 20 milliGRAM(s) Oral daily  metoprolol succinate ER 75 milliGRAM(s) Oral daily  predniSONE   Tablet 40 milliGRAM(s) Oral daily  warfarin 7.5 milliGRAM(s) Oral at bedtime    MEDICATIONS  (PRN):  acetaminophen   Tablet .. 650 milliGRAM(s) Oral every 6 hours PRN Temp greater or equal to 38C (100.4F), Mild Pain (1 - 3), Moderate Pain (4 - 6), Severe Pain (7 - 10)  calamine Lotion 1 Application(s) Topical three times a day PRN Itching  heparin  Injectable 6500 Unit(s) IV Push every 6 hours PRN For aPTT less than 40  heparin  Injectable 3000 Unit(s) IV Push every 6 hours PRN For aPTT between 40 - 57        PHYSICAL EXAM:  Vital Signs Last 24 Hrs  T(C): 36.9 (14 Aug 2019 01:00), Max: 37.1 (13 Aug 2019 15:00)  T(F): 98.5 (14 Aug 2019 01:00), Max: 98.8 (13 Aug 2019 15:00)  HR: 57 (14 Aug 2019 07:00) (52 - 88)  BP: 134/91 (14 Aug 2019 07:00) (126/76 - 163/103)  BP(mean): 107 (14 Aug 2019 07:00) (95 - 132)  RR: 20 (14 Aug 2019 07:00) (16 - 29)  SpO2: 98% (13 Aug 2019 17:00) (98% - 98%)  I&O's Summary    13 Aug 2019 07:01  -  14 Aug 2019 07:00  --------------------------------------------------------  IN: 1468 mL / OUT: 1400 mL / NET: 68 mL        Appearance: Normal	  HEENT:   Normal oral mucosa, PERRL, EOMI	  Lymphatic: No lymphadenopathy  Cardiovascular: Normal S1 S2, No JVD, No murmurs, No edema  Respiratory: Lungs clear to auscultation	  Psychiatry: A & O x 3, Mood & affect appropriate  Gastrointestinal:  Soft, Non-tender, + BS	  Skin: No rashes, No ecchymoses, No cyanosis	  Neurologic: Non-focal  Extremities: Normal range of motion, No clubbing, cyanosis or edema  Vascular: Peripheral pulses palpable 2+ bilaterally    LABS:	 	                        14.1   13.6  )-----------( 254      ( 14 Aug 2019 06:23 )             42.7     Auto Eosinophil # x     / Auto Eosinophil % x     / Auto Neutrophil # x     / Auto Neutrophil % x     / BANDS % x                            12.3   12.0  )-----------( 225      ( 13 Aug 2019 01:28 )             39.0     Auto Eosinophil # x     / Auto Eosinophil % x     / Auto Neutrophil # x     / Auto Neutrophil % x     / BANDS % x                            13.1   13.8  )-----------( 273      ( 12 Aug 2019 17:35 )             40.7     Auto Eosinophil # x     / Auto Eosinophil % x     / Auto Neutrophil # x     / Auto Neutrophil % x     / BANDS % x        INR: 1.30 ratio (08-14 @ 06:23)  INR: 1.22 ratio (08-13 @ 01:28)  INR: 1.25 ratio (08-12 @ 17:35)    08-14    137  |  101  |  23  ----------------------------<  100<H>  4.3   |  19<L>  |  1.04  08-13    134<L>  |  102  |  22  ----------------------------<  170<H>  4.1   |  19<L>  |  0.92    Ca    9.7      14 Aug 2019 06:23  Mg     2.3     08-14  Phos  4.0     08-14  TPro  8.2  /  Alb  4.1  /  TBili  0.4  /  DBili  x   /  AST  129<H>  /  ALT  244<H>  /  AlkPhos  75  08-14  TPro  6.9  /  Alb  3.5  /  TBili  0.2  /  DBili  x   /  AST  87<H>  /  ALT  171<H>  /  AlkPhos  65  08-13        proBNP:   Lipid Profile: 08-10 Chol 175 <H> HDL 50 Trig 88  HgA1c: 5.6 % (08-10 @ 10:21)    TSH: Thyroid Stimulating Hormone, Serum: 5.66 uIU/mL (08-10 @ 10:06)      CARDIAC MARKERS:   10 Aug 2019 12:45 Troponin x     / Creatine Kinase 171 U/L /  CKMB 14.1 ng/mL / CPK Mass Assay % 8.2 %   10 Aug 2019 06:20 Troponin x     / Creatine Kinase 183 U/L /  CKMB 18.5 ng/mL / CPK Mass Assay % 10.1 %   10 Aug 2019 00:50 Troponin x     / Creatine Kinase 208 U/L /  CKMB 18.6 ng/mL / CPK Mass Assay % 8.9 %        TELEMETRY: 	    ECG:  	  RADIOLOGY:  OTHER: 	    PREVIOUS DIAGNOSTIC TESTING:    [ ] Echocardiogram:  [ ]  Catheterization:  [ ] Stress Test:  	  	  JOSEPH Berrios  Contact #	[phone] Patient is a 54y old  Male who presents with a chief complaint of NSTEMI (13 Aug 2019 15:40)    HPI:  54M tobacco user w/ no known PMH (has not seen a doctor in many years) who presented to [place] with chest pain, noted to have elevated troponin and transferred to [facility]. Pt reports his symptoms began around noon while eating lunch. He reports 8/10 chest pain/pressure that radiated down his L arm and to his back. He felt short of breath during the episode. He went to the outside hospital where he received sublingual nitro and his symptoms improved. At this time his chest pain is 2-3/10 and he feels much improved. The pt denies any recent chest pain prior to today. No syncope, dyspnea, peripheral edema.   Of note, pt was recently in the woods/garden by a friends house 3d prior. States he had multiple mosquito bites all over his arm and exposure to bushes, does not know whether they looked like poison ivy but recalls that he was wearing a short-sleeved shirt and first noticed the rash on his b/l forearms. Afterwards, developed rash on his legs. The rash is itchy, although less now compared to before. Pt went to a doctor ([facility]), where he was told he has poison ivy, was Rx'd a steroid taper (took 50mg x1 yesterday only), hydroxyzine PRN and bactrim. Has also been taking his friend's halobetasol which has helped.  At OSH,  /144. Pt was started on heparin and nitro GTT, received ASA/brilinta load, transferred to [facility] for further eval. probnp 816. Trop I 1.420.  ED Course:  /104  HR 94  RR 20 T 98.6F 97%02  In the ED, pt was resumed on heparin and nitro GTT. Transferred to CCU for monitoring of NSTEMI.   Currently denies CP, SOB or palpitations. Has some headaches after starting nitro. (10 Aug 2019 02:09)    Overnight Event: No VT overnight    REVIEW OF SYSTEMS: patient stated that he had an episode in the middle of the night of chest pain that came and went away quickly.  Also, he felt lightheaded this AM while ambulating.  That episode was brief also.  	    MEDICATIONS  (STANDING):  aspirin  chewable 81 milliGRAM(s) Oral daily  atorvastatin 80 milliGRAM(s) Oral at bedtime  chlorhexidine 2% Cloths 1 Application(s) Topical at bedtime  clobetasol 0.05% Ointment 1 Application(s) Topical two times a day  clopidogrel Tablet 75 milliGRAM(s) Oral daily  heparin  Infusion.  Unit(s)/Hr (14 mL/Hr) IV Continuous <Continuous>  lisinopril 20 milliGRAM(s) Oral daily  metoprolol succinate ER 75 milliGRAM(s) Oral daily  predniSONE   Tablet 40 milliGRAM(s) Oral daily  warfarin 7.5 milliGRAM(s) Oral at bedtime    MEDICATIONS  (PRN):  acetaminophen   Tablet .. 650 milliGRAM(s) Oral every 6 hours PRN Temp greater or equal to 38C (100.4F), Mild Pain (1 - 3), Moderate Pain (4 - 6), Severe Pain (7 - 10)  calamine Lotion 1 Application(s) Topical three times a day PRN Itching  heparin  Injectable 6500 Unit(s) IV Push every 6 hours PRN For aPTT less than 40  heparin  Injectable 3000 Unit(s) IV Push every 6 hours PRN For aPTT between 40 - 57        PHYSICAL EXAM:  Vital Signs Last 24 Hrs  T(C): 36.9 (14 Aug 2019 01:00), Max: 37.1 (13 Aug 2019 15:00)  T(F): 98.5 (14 Aug 2019 01:00), Max: 98.8 (13 Aug 2019 15:00)  HR: 57 (14 Aug 2019 07:00) (52 - 88)  BP: 134/91 (14 Aug 2019 07:00) (126/76 - 163/103)  BP(mean): 107 (14 Aug 2019 07:00) (95 - 132)  RR: 20 (14 Aug 2019 07:00) (16 - 29)  SpO2: 98% (13 Aug 2019 17:00) (98% - 98%)  I&O's Summary    13 Aug 2019 07:01  -  14 Aug 2019 07:00  --------------------------------------------------------  IN: 1468 mL / OUT: 1400 mL / NET: 68 mL      Appearance: Normal	  HEENT:   Normal oral mucosa, PERRL, EOMI	  Cardiovascular: Normal S1 S2, No JVD, No murmurs, No edema  Respiratory: Lungs clear to auscultation	  Psychiatry: A & O x 3, Mood & affect appropriate  Gastrointestinal:  Soft, Non-tender, + BS	  Skin: erythematous rash on both arms and patches on legs, No ecchymoses, No cyanosis	  Neurologic: Non-focal  Extremities: Normal range of motion, No clubbing, cyanosis or edema  Vascular: Peripheral pulses palpable 2+ bilaterally    LABS:	 	                        14.1   13.6  )-----------( 254      ( 14 Aug 2019 06:23 )             42.7     Auto Eosinophil # x     / Auto Eosinophil % x     / Auto Neutrophil # x     / Auto Neutrophil % x     / BANDS % x                            12.3   12.0  )-----------( 225      ( 13 Aug 2019 01:28 )             39.0     Auto Eosinophil # x     / Auto Eosinophil % x     / Auto Neutrophil # x     / Auto Neutrophil % x     / BANDS % x                            13.1   13.8  )-----------( 273      ( 12 Aug 2019 17:35 )             40.7     Auto Eosinophil # x     / Auto Eosinophil % x     / Auto Neutrophil # x     / Auto Neutrophil % x     / BANDS % x        INR: 1.30 ratio (08-14 @ 06:23)  INR: 1.22 ratio (08-13 @ 01:28)  INR: 1.25 ratio (08-12 @ 17:35)    08-14    137  |  101  |  23  ----------------------------<  100<H>  4.3   |  19<L>  |  1.04  08-13    134<L>  |  102  |  22  ----------------------------<  170<H>  4.1   |  19<L>  |  0.92    Ca    9.7      14 Aug 2019 06:23  Mg     2.3     08-14  Phos  4.0     08-14  TPro  8.2  /  Alb  4.1  /  TBili  0.4  /  DBili  x   /  AST  129<H>  /  ALT  244<H>  /  AlkPhos  75  08-14  TPro  6.9  /  Alb  3.5  /  TBili  0.2  /  DBili  x   /  AST  87<H>  /  ALT  171<H>  /  AlkPhos  65  08-13      Lipid Profile: 08-10 Chol 175 <H> HDL 50 Trig 88  HgA1c: 5.6 % (08-10 @ 10:21)  TSH: Thyroid Stimulating Hormone, Serum: 5.66 uIU/mL (08-10 @ 10:06)      CARDIAC MARKERS:   10 Aug 2019 12:45 Troponin x     / Creatine Kinase 171 U/L /  CKMB 14.1 ng/mL / CPK Mass Assay % 8.2 %   10 Aug 2019 06:20 Troponin x     / Creatine Kinase 183 U/L /  CKMB 18.5 ng/mL / CPK Mass Assay % 10.1 %   10 Aug 2019 00:50 Troponin x     / Creatine Kinase 208 U/L /  CKMB 18.6 ng/mL / CPK Mass Assay % 8.9 %      TELEMETRY: 	some PVC's    ECG: NORMAL SINUS RHYTHM   	  PREVIOUS DIAGNOSTIC TESTING:    [ ] Echocardiogram: < from: Transthoracic Echocardiogram (08.10.19 @ 08:07) >  CONCLUSIONS:  1. The left ventricle is mildly dilated.  There is globaly  hypokinesis with minor regional variation.  The LVEF about  25%.  There is a filling defect in the apical septum with  Definity contrast (and suggested on the non-Defiinty image  #59), consistent with a left ventricular apical thrombus.  2. The right ventricle is not well visualized.  On the  parasternal and right ventricular outflow views the  function is normal.  However, not all the walls are well  seen as the apical views are limited.  3. There is no pericardial effusion.  4. There is no significant valvular regurgitation or  stenosis.    < end of copied text >    [ ]  Catheterization: < from: Cardiac Cath Lab - Adult (08.10.19 @ 04:15) >  VENTRICLES: EF estimated was 25 %.  CORONARY VESSELS: The coronary circulation is right dominant.  LM:   --  LM: Normal.  LAD:   --  Proximal LAD: There was a 100 % stenosis.  CX:   --  Circumflex: Angiography showed minor luminal irregularities with  no flow limiting lesions.  RCA:   --  RCA: Angiography showed minor luminal irregularities with no  flow limiting lesions.  COMPLICATIONS: Therewere no complications.  DIAGNOSTIC RECOMMENDATIONS:  1. Successful PCI to the mLAD with a 3.0mm RADHA post-dilated to 3.5mm in the  setting of a STEMI.    < end of copied text >  	  	  DIO Berrios[facility]  Contact # [phone]

## 2019-08-14 NOTE — PROGRESS NOTE ADULT - ASSESSMENT
53 y/o male, current smoker, admitted for AWSTEMI s/p 1 RADHA to pLAD c/b newly found EF of 25% and LV thrombus. Currently anticoagulated on heparin drip to coumadin transition.  Hospital course complicated by 22 beat run of NSVT.

## 2019-08-14 NOTE — PROGRESS NOTE ADULT - SUBJECTIVE AND OBJECTIVE BOX
Cardiology Progress Note    Interval: Pt resting comfortably. Noted minimal chest pain but feels much better. No complaints otherwise.    Tele: Sinus rhythm     HPI:  54M tobacco user w/ no known PMH (has not seen a doctor in many years) who presented to [place] with chest pain, noted to have elevated troponin and transferred to [facility]. Pt reports his symptoms began around noon while eating lunch. He reports 8/10 chest pain/pressure that radiated down his L arm and to his back. He felt short of breath during the episode. He went to the outside hospital where he received sublingual nitro and his symptoms improved. At this time his chest pain is 2-3/10 and he feels much improved. The pt denies any recent chest pain prior to today. No syncope, dyspnea, peripheral edema.     Of note, pt was recently in the woods/garden by a friends house 3d prior. States he had multiple mosquito bites all over his arm and exposure to bushes, does not know whether they looked like poison ivy but recalls that he was wearing a short-sleeved shirt and first noticed the rash on his b/l forearms. Afterwards, developed rash on his legs. The rash is itchy, although less now compared to before. Pt went to a doctor (?[facility]), where he was told he has poison ivy, was Rx'd a steroid taper (took 50mg x1 yesterday only), hydroxyzine PRN and bactrim. Has also been taking his friend's halobetasol which has helped.    At OSH,  /144. Pt was started on heparin and nitro GTT, received ASA/brilinta load, transferred to [facility] for further eval. probnp 816. Trop I 1.420.    ED Course:  /104  HR 94  RR 20 T 98.6F 97%02  In the ED, pt was resumed on heparin and nitro GTT. Transferred to CCU for monitoring of NSTEMI.   Currently denies CP, SOB or palpitations. Has some headaches after starting nitro. (10 Aug 2019 02:09)      Medications:  acetaminophen   Tablet .. 650 milliGRAM(s) Oral every 6 hours PRN  aspirin  chewable 81 milliGRAM(s) Oral daily  atorvastatin 80 milliGRAM(s) Oral at bedtime  calamine Lotion 1 Application(s) Topical three times a day PRN  chlorhexidine 2% Cloths 1 Application(s) Topical at bedtime  clobetasol 0.05% Ointment 1 Application(s) Topical two times a day  clopidogrel Tablet 75 milliGRAM(s) Oral daily  heparin  Infusion.  Unit(s)/Hr IV Continuous <Continuous>  heparin  Injectable 6500 Unit(s) IV Push every 6 hours PRN  heparin  Injectable 3000 Unit(s) IV Push every 6 hours PRN  lisinopril 20 milliGRAM(s) Oral daily  metoprolol succinate ER 75 milliGRAM(s) Oral daily  warfarin 10 milliGRAM(s) Oral once      Review of Systems:  Constitutional: [ ] Fever [ ] Chills [ ] Fatigue [ ] Weight change   HEENT: [ ] Blurred vision [ ] Eye Pain [ ] Headache [ ] Runny nose [ ] Sore Throat   Respiratory: [ ] Cough [ ] Wheezing [ ] Shortness of breath  Cardiovascular: [x] Chest Pain - improving [ ] Palpitations [ ] VALERO [ ] PND [ ] Orthopnea  Gastrointestinal: [ ] Abdominal Pain [ ] Diarrhea [ ] Constipation [ ] Hemorrhoids [ ] Nausea [ ] Vomiting  Genitourinary: [ ] Nocturia [ ] Dysuria [ ] Incontinence  Extremities: [ ] Swelling [ ] Joint Pain  Neurologic: [ ] Focal deficit [ ] Paresthesias [ ] Syncope  Lymphatic: [ ] Swelling [ ] Lymphadenopathy   Skin: [ ] Rash [ ] Ecchymoses [ ] Wounds [ ] Lesions  Psychiatry: [ ] Depression [ ] Suicidal/Homicidal Ideation [ ] Anxiety [ ] Sleep Disturbances  [ ] 10 point review of systems is otherwise negative except as mentioned above            [ ]Unable to obtain    Vitals:  T(C): 36.9 (19 @ 19:00), Max: 36.9 (19 @ 01:00)  HR: 57 (19 @ 20:00) (52 - 75)  BP: 133/80 (19 @ 20:00) (125/80 - 163/103)  BP(mean): 101 (19 @ 20:00) (94 - 128)  RR: 20 (19 @ 20:00) (15 - 29)  SpO2: 100% (19 @ 16:00) (99% - 100%)  Wt(kg): --  Daily     Daily Weight in k.4 (14 Aug 2019 06:00)  I&O's Summary    13 Aug 2019 07:  -  14 Aug 2019 07:00  --------------------------------------------------------  IN: 1484 mL / OUT: 1400 mL / NET: 84 mL    14 Aug 2019 07:01  -  14 Aug 2019 21:17  --------------------------------------------------------  IN: 692 mL / OUT: 850 mL / NET: -158 mL        Physical Exam:  General: NAD  Eye: PERRL, EOMI  HENT: Normal oral mucosa NC/AT  CV: Normal S1/S2, RRR, No M/R/G, no edema, no elevation in JVP  Resp: Normal respiratory effort, clear to auscultation bilaterally, no wheezing, no crackles  Abd: Soft, Non-tender, Non-distended, BS+  Ext: No clubbing, No joint deformity   Neuro: Non-focal, motor and sensory intact  Lymph: No lymphadenopathy  Psych: AAOx3, Mood & affect appropriate  Skin: Diffuse rashes, No ecchymoses, No cyanosis    Labs:                        14.1   13.6  )-----------( 254      ( 14 Aug 2019 06:23 )             42.7     08-14    137  |  101  |  23  ----------------------------<  100<H>  4.3   |  19<L>  |  1.04    Ca    9.7      14 Aug 2019 06:23  Phos  4.0     08-14  Mg     2.3     08-14    TPro  8.2  /  Alb  4.1  /  TBili  0.4  /  DBili  x   /  AST  129<H>  /  ALT  244<H>  /  AlkPhos  75  08-14    PT/INR - ( 14 Aug 2019 06:23 )   PT: 14.9 sec;   INR: 1.30 ratio         PTT - ( 14 Aug 2019 06:23 )  PTT:59.1 sec              New results/imaging:

## 2019-08-14 NOTE — PROGRESS NOTE ADULT - ASSESSMENT
55 y/o Male, active smoker with no known PMHx,  transferred from [facility] with chest pain;  found to have AWSTEMI. Left heart cath  8/10  revealed  100% stenosis in proximal LAD. Patient now s/p 1 RADHA to pLAD. Hospital course c/b LV apical thrombus and 23 beats NSVT in the setting of new EF of 25 %.     # NSVT   # ICM> EF 25%  #LV apical Thrombus     - Continue w/ GDMT including Toprol 75mg po daily   - Monitor and replete electrolytes for goal Mg>++2; K>+4  - Continue w/ Systemic AC for Apical thrombus ( Coumadin w/ heparin gtt bridge)   - Continue telemetry monitoring   - Will discuss further plan of care  w/ Attending       [id number] 55 y/o Male, active smoker with no known PMHx,  transferred from [facility] with chest pain;  found to have AWSTEMI. Left heart cath  8/10  revealed  100% stenosis in proximal LAD. Patient now s/p 1 RADHA to pLAD. Hospital course c/b LV apical thrombus and 23 beats NSVT in the setting of new EF of 25 %.     # NSVT   # ICM> EF 25%  #LV apical Thrombus     - Continue w/ GDMT including Toprol 75mg po daily   - Monitor and replete electrolytes for goal Mg>++2; K>+4  - Continue w/ Systemic AC for Apical thrombus ( Coumadin w/ heparin gtt bridge)   - Continue telemetry monitoring   -  Continue to monitor; possible EP study in 1-2 days   [id number]

## 2019-08-14 NOTE — PROGRESS NOTE ADULT - PROBLEM SELECTOR PLAN 1
continue ASA and Plavix  continue atorvastatin 80mg QHS   continue Toprol XL 75mg  continue Lisinopril 20mg continue Toprol XL 75mg  EP following

## 2019-08-14 NOTE — PROGRESS NOTE ADULT - SUBJECTIVE AND OBJECTIVE BOX
INTERVAL HPI/OVERNIGHT EVENTS: Patient is mainly German speaking; [employer] Interpretation service, agent [id number], assisted w/ translation. Patient admits to intermittent, mild, chest discomfort at night; denies current CP/SOB/ dizziness/palpitations.     MEDICATIONS  (STANDING):  aspirin  chewable 81 milliGRAM(s) Oral daily  atorvastatin 80 milliGRAM(s) Oral at bedtime  chlorhexidine 2% Cloths 1 Application(s) Topical at bedtime  clobetasol 0.05% Ointment 1 Application(s) Topical two times a day  clopidogrel Tablet 75 milliGRAM(s) Oral daily  heparin  Infusion.  Unit(s)/Hr (14 mL/Hr) IV Continuous <Continuous>  lisinopril 20 milliGRAM(s) Oral daily  metoprolol succinate ER 75 milliGRAM(s) Oral daily  warfarin 7.5 milliGRAM(s) Oral at bedtime    MEDICATIONS  (PRN):  acetaminophen   Tablet .. 650 milliGRAM(s) Oral every 6 hours PRN Temp greater or equal to 38C (100.4F), Mild Pain (1 - 3), Moderate Pain (4 - 6), Severe Pain (7 - 10)  calamine Lotion 1 Application(s) Topical three times a day PRN Itching  heparin  Injectable 6500 Unit(s) IV Push every 6 hours PRN For aPTT less than 40  heparin  Injectable 3000 Unit(s) IV Push every 6 hours PRN For aPTT between 40 - 57      Allergies  No Known Allergies    Intolerances      ROS:  General: Pt denies fevers.  Cardiovascular: denies chest pain/palpitations/dizziness.   Respiratory and Thorax: denies SOB  Gastrointestinal: denies abdominal pain/diarrhea/bloody stool  Genitourinary: denies dysuria/ hematuria   Hematologic: denies abnormal bleeding    Vital Signs Last 24 Hrs  T(C): 36.9 (14 Aug 2019 08:00), Max: 37.1 (13 Aug 2019 15:00)  T(F): 98.5 (14 Aug 2019 08:00), Max: 98.8 (13 Aug 2019 15:00)  HR: 63 (14 Aug 2019 09:00) (52 - 88)  BP: 135/85 (14 Aug 2019 09:00) (126/76 - 163/103)  BP(mean): 105 (14 Aug 2019 09:00) (95 - 132)  RR: 21 (14 Aug 2019 09:00) (16 - 29)  SpO2: 99% (14 Aug 2019 08:00) (98% - 99%)    Physical Exam:  Constitutional: well developed, well nourished and in  no acute distress  Neurological: Alert & Oriented x 3,  no focal deficits  Respiratory: Breathing nonlabored; CTA bilaterally.   Cardiovascular: (+) S1 & S2, RRR  Gastrointestinal: soft, NT/ no rebound, nondistended, (+) BS  Extremities: +2 bilateral radial and DP pulses. No pedal edema.   Skin:  +  maculopapular rash to bilateral upper and lower extremities and lower abdomen.         LABS:                        14.1   13.6  )-----------( 254      ( 14 Aug 2019 06:23 )             42.7     08-14    137  |  101  |  23  ----------------------------<  100<H>  4.3   |  19<L>  |  1.04    Ca    9.7      14 Aug 2019 06:23  Phos  4.0     08-14  Mg     2.3     08-14    TPro  8.2  /  Alb  4.1  /  TBili  0.4  /  DBili  x   /  AST  129<H>  /  ALT  244<H>  /  AlkPhos  75  08-14    PT/INR - ( 14 Aug 2019 06:23 )   PT: 14.9 sec;   INR: 1.30 ratio         PTT - ( 14 Aug 2019 06:23 )  PTT:59.1 sec      RADIOLOGY & ADDITIONAL TESTS: < from: Transthoracic Echocardiogram (08.10.19 @ 08:07) >    Patient name: CAMILLA STILL  YOB: 1965   Age: 54 (M)   MR#: [id number]  Study Date: 8/10/2019  [facility]  Study quality: Technically difficult  Referring Physician: Reggie Yusuf MD  Blood Pressure: 157/82 mmHg  Height: 168 cm  Weight: 76 kg  BSA: 1.9 m2  Heart Rate: 63 mmHg  ------------------------------------------------------------------------  PROCEDURE: Transthoracic echocardiogram with 2-D, M-Mode  and complete spectral and colorflow Doppler.  INDICATION: Shortness of breath (R06.02)  ------------------------------------------------------------------------  MEASUREMENTS: (See below)  ------------------------------------------------------------------------  OBSERVATIONS:  Mitral Valve: Normal mitral valve. No mitral valve  regurgitation seen.  Aortic Root: Aortic Root: 4.1 cm.  The aortic root is  mildly dilated.  Aortic Valve: Normal trileaflet aortic valve.  There is no  aortic stenosis. Peak transaortic valve gradient equals 4  mm Hg, mean transaortic valve gradient equals 247 mm Hg,  aortic valve velocity time integral equals 20 cm, estimated  aortic valve area equals 3.3 sqcm. No aortic valve  regurgitation seen. Peak left ventricular outflow tract  gradient equals 2 mm Hg, LVOT velocity time integral equals  13 cm.  Left Atrium: Normal left atrium.  LA volume index = 34  cc/m2.  Left Ventricle: There is globaly hypokinesis with minor  regional variation.  The LVEF about 25%.  There is a  filling defect in the apical septum with Definity contrast  (and suggested on the non-Defiinty image #59), consistent  with a  left ventricular apical thrombus.  Endocardial visualization enhanced with intravenous  injection of Ultrasonic Enhancing Agent (Definity). The  left ventricle is mildly dilated. Reversal of the E-A  waves of the mitral inflow pattern is consistent with  diastolic LV dysfunction.  Right Heart: The right atrium is mildly dilated.  The right atrial area= 22cm2, the right atrial volume=  76ml, the right atrial volume index= 41ml/m2. The right  ventricle is not well visualized.  On the parasternal and  right ventricular outflow views the function is normal.  However, not all the walls are well seen as the apical  views are limited. Normal tricuspid valve. No tricuspid  regurgitation. Normal pulmonic valve. No pulmonic  regurgitation.  Pericardium/PleuraThere is no pericardial effusion.  Hemodynamic: Estimated right atrial pressure is 8 mm Hg.  ------------------------------------------------------------------------  CONCLUSIONS:  1. The left ventricle is mildly dilated.  There is globaly  hypokinesis with minor regional variation.  The LVEF about  25%.  There is a filling defect in the apical septum with  Definity contrast (and suggested on the non-Defiinty image  #59), consistent with a left ventricular apical thrombus.  2. The right ventricle is not well visualized.  On the  parasternal and right ventricular outflow views the  function is normal.  However, not all the walls are well  seen as the apical views are limited.  3. There is no pericardial effusion.  4. There is no significant valvular regurgitation or  stenosis.  There are no prior studies available for comparison.  Results discussed with Dr. Reggie Santos on 8/10/2019 at  4:33PM when these results were seen.  ------------------------------------------------------------------------  PROCEDURE DESCRIPTION: Transthoracic echocardiogram with  2-D, M-Mode and complete spectral and color flow Doppler.  ------------------------------------------------------------------------  ECHOCARDIOGRAPHIC EXAMINATION:  AORTIC ROOT:  Aortic Root (Leaflet): 4.1 cm  Aortic Root Index: 1.2  LEFT ATRIUM:  AP Dimensions: 3.8 cm  LA Volume Index: 34.00 cc/sqm  LA Volume: 62.7 cc  LEFT VENTRICLE:  LVIDd: 5.8 cm  LVIDs: 4.8 cm  Fraction Short: 16 %  IVS: 1.20  ILWT: 1.1 cm  RWT: 0.37  LV Mass: 281.0 gm  LV Mass Index: 152 gm/sqm  HR and BP:  HR: 63 bpm  BP: 157/82  BSA: 1.85  ------------------------------------------------------------------------  COLOR FLOW and SPECIAL DOPPLER:  AORTIC VALVE:  AV Peak Velocity: 1.0 M/sec  AV Peak Gradient: 4 mm Hg  AV Mean Gradient: 247 mm Hg  Valve Area: 3.3 sqcm  LVOT:  LVOT Velocity: .6 M/sec  LVOT Diameter: 2.5 cm  LVOT Peak Gradient Rest: 1 mm Hg  ------------------------------------------------------------------------  ------------------------------------------------------------------------  Confirmed on  8/10/2019 - 16:35:34 by Ellie Astudillo M.D.  ------------------------------------------------------------------------    < end of copied text >      TELE: Sinus 50's-60's with occasional PACs, PVCs, no further runs of NSVT overnight.     EKG: Sinus leslie 56 bpm

## 2019-08-15 DIAGNOSIS — I51.3 INTRACARDIAC THROMBOSIS, NOT ELSEWHERE CLASSIFIED: ICD-10-CM

## 2019-08-15 DIAGNOSIS — I47.2 VENTRICULAR TACHYCARDIA: ICD-10-CM

## 2019-08-15 LAB
ALBUMIN SERPL ELPH-MCNC: 3.8 G/DL — SIGNIFICANT CHANGE UP (ref 3.3–5)
ALP SERPL-CCNC: 72 U/L — SIGNIFICANT CHANGE UP (ref 40–120)
ALT FLD-CCNC: 281 U/L — HIGH (ref 10–45)
ANION GAP SERPL CALC-SCNC: 14 MMOL/L — SIGNIFICANT CHANGE UP (ref 5–17)
APTT BLD: 100.5 SEC — HIGH (ref 27.5–36.3)
APTT BLD: 118.3 SEC — HIGH (ref 27.5–36.3)
APTT BLD: 92.1 SEC — HIGH (ref 27.5–36.3)
AST SERPL-CCNC: 179 U/L — HIGH (ref 10–40)
BILIRUB SERPL-MCNC: 0.3 MG/DL — SIGNIFICANT CHANGE UP (ref 0.2–1.2)
BLD GP AB SCN SERPL QL: NEGATIVE — SIGNIFICANT CHANGE UP
BUN SERPL-MCNC: 24 MG/DL — HIGH (ref 7–23)
CALCIUM SERPL-MCNC: 9.5 MG/DL — SIGNIFICANT CHANGE UP (ref 8.4–10.5)
CHLORIDE SERPL-SCNC: 102 MMOL/L — SIGNIFICANT CHANGE UP (ref 96–108)
CO2 SERPL-SCNC: 20 MMOL/L — LOW (ref 22–31)
CREAT SERPL-MCNC: 1.03 MG/DL — SIGNIFICANT CHANGE UP (ref 0.5–1.3)
GLUCOSE SERPL-MCNC: 95 MG/DL — SIGNIFICANT CHANGE UP (ref 70–99)
HCT VFR BLD CALC: 45.7 % — SIGNIFICANT CHANGE UP (ref 39–50)
HGB BLD-MCNC: 14.1 G/DL — SIGNIFICANT CHANGE UP (ref 13–17)
INR BLD: 1.93 RATIO — HIGH (ref 0.88–1.16)
MAGNESIUM SERPL-MCNC: 2.3 MG/DL — SIGNIFICANT CHANGE UP (ref 1.6–2.6)
MCHC RBC-ENTMCNC: 28.3 PG — SIGNIFICANT CHANGE UP (ref 27–34)
MCHC RBC-ENTMCNC: 30.8 GM/DL — LOW (ref 32–36)
MCV RBC AUTO: 92 FL — SIGNIFICANT CHANGE UP (ref 80–100)
PHOSPHATE SERPL-MCNC: 4.4 MG/DL — SIGNIFICANT CHANGE UP (ref 2.5–4.5)
PLATELET # BLD AUTO: 252 K/UL — SIGNIFICANT CHANGE UP (ref 150–400)
POTASSIUM SERPL-MCNC: 4.7 MMOL/L — SIGNIFICANT CHANGE UP (ref 3.5–5.3)
POTASSIUM SERPL-SCNC: 4.7 MMOL/L — SIGNIFICANT CHANGE UP (ref 3.5–5.3)
PROT SERPL-MCNC: 7.5 G/DL — SIGNIFICANT CHANGE UP (ref 6–8.3)
PROTHROM AB SERPL-ACNC: 22.7 SEC — HIGH (ref 10–12.9)
RBC # BLD: 4.97 M/UL — SIGNIFICANT CHANGE UP (ref 4.2–5.8)
RBC # FLD: 12.2 % — SIGNIFICANT CHANGE UP (ref 10.3–14.5)
RH IG SCN BLD-IMP: POSITIVE — SIGNIFICANT CHANGE UP
SODIUM SERPL-SCNC: 136 MMOL/L — SIGNIFICANT CHANGE UP (ref 135–145)
WBC # BLD: 12.1 K/UL — HIGH (ref 3.8–10.5)
WBC # FLD AUTO: 12.1 K/UL — HIGH (ref 3.8–10.5)

## 2019-08-15 PROCEDURE — 93010 ELECTROCARDIOGRAM REPORT: CPT

## 2019-08-15 PROCEDURE — 99233 SBSQ HOSP IP/OBS HIGH 50: CPT | Mod: GC

## 2019-08-15 RX ORDER — WARFARIN SODIUM 2.5 MG/1
5 TABLET ORAL ONCE
Refills: 0 | Status: COMPLETED | OUTPATIENT
Start: 2019-08-15 | End: 2019-08-15

## 2019-08-15 RX ADMIN — Medication 81 MILLIGRAM(S): at 11:16

## 2019-08-15 RX ADMIN — CLOPIDOGREL BISULFATE 75 MILLIGRAM(S): 75 TABLET, FILM COATED ORAL at 11:16

## 2019-08-15 RX ADMIN — CALAMINE AND ZINC OXIDE AND PHENOL 1 APPLICATION(S): 160; 10 LOTION TOPICAL at 05:46

## 2019-08-15 RX ADMIN — HEPARIN SODIUM 1200 UNIT(S)/HR: 5000 INJECTION INTRAVENOUS; SUBCUTANEOUS at 21:05

## 2019-08-15 RX ADMIN — HEPARIN SODIUM 1400 UNIT(S)/HR: 5000 INJECTION INTRAVENOUS; SUBCUTANEOUS at 06:51

## 2019-08-15 RX ADMIN — LISINOPRIL 20 MILLIGRAM(S): 2.5 TABLET ORAL at 05:47

## 2019-08-15 RX ADMIN — Medication 1 APPLICATION(S): at 05:48

## 2019-08-15 RX ADMIN — HEPARIN SODIUM 1400 UNIT(S)/HR: 5000 INJECTION INTRAVENOUS; SUBCUTANEOUS at 14:08

## 2019-08-15 RX ADMIN — ATORVASTATIN CALCIUM 80 MILLIGRAM(S): 80 TABLET, FILM COATED ORAL at 21:04

## 2019-08-15 RX ADMIN — WARFARIN SODIUM 5 MILLIGRAM(S): 2.5 TABLET ORAL at 21:04

## 2019-08-15 RX ADMIN — CALAMINE AND ZINC OXIDE AND PHENOL 1 APPLICATION(S): 160; 10 LOTION TOPICAL at 05:45

## 2019-08-15 RX ADMIN — Medication 1 APPLICATION(S): at 07:02

## 2019-08-15 RX ADMIN — Medication 1 APPLICATION(S): at 17:02

## 2019-08-15 RX ADMIN — Medication 75 MILLIGRAM(S): at 05:47

## 2019-08-15 NOTE — PROGRESS NOTE ADULT - ASSESSMENT
53 y/o Male, active smoker with no known PMHx,  transferred from [facility] with chest pain;  found to have AWSTEMI. Left heart cath  8/10  revealed  100% stenosis in proximal LAD. Patient now s/p 1 RADHA to pLAD. Hospital course c/b LV apical thrombus and 23 beats NSVT in the setting of new EF of 25 %.

## 2019-08-15 NOTE — PROGRESS NOTE ADULT - PROBLEM SELECTOR PLAN 1
23 beat run of VT >200 bpm late post AWSTEMI,  ICM with likely new low EF 25 %  continue  GDMT including Toprol 75mg po daily, and max dose Lisinopril 20 mg daily   Monitor and replete electrolytes for goal Mg>++2; K>+4  NPO after midnight for EP study +/- ICD,  If monomorphic VT is  inducible he would be a candidate for an ICD implant without waiting the traditional 90 days, if EPS is negative continue beta blockers with reassessment of LV function in 3 months if remains <35% then ICD implant at that time would be appropriate. Due to late  revascularization recovery of LV function less likely.  need current type and screen
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
## 2019-08-15 NOTE — PROGRESS NOTE ADULT - ASSESSMENT
54 year old M pt with no significant PMH who presented w/ AWSTEMI s/p RADHA pLAD.  Now w/ ICMO w/ EF<30% and LV thrombus.  Currently on hep GTT/coumadin, and rest of meds.  Euvolemic.  Did have a prolong episode of NSVT.  No recurrent episodes.      RECS  - cont current meds  - EP study in the morning as per CHOLO Yanes MD   [place] [facility]

## 2019-08-15 NOTE — PROGRESS NOTE ADULT - PROBLEM SELECTOR PLAN 1
continue ASA 81mg QD and Plavix 75mg QD  continue atorvastatin 80mg QHS   continue Toprol XL 75mg  continue Lisinopril 20mg.

## 2019-08-15 NOTE — PROGRESS NOTE ADULT - PROBLEM SELECTOR PLAN 2
LV apical Thrombus  Continue Coumadin for AC for Apical thrombus goal 2-3   currently on DAPT LV apical Thrombus  Continue Coumadin for AC for LV Apical thrombus goal 2-3   currently on DAPT

## 2019-08-15 NOTE — PROGRESS NOTE ADULT - SUBJECTIVE AND OBJECTIVE BOX
HPI: no VT since 8/13/19    MEDICATIONS  (STANDING):  aspirin  chewable 81 milliGRAM(s) Oral daily  atorvastatin 80 milliGRAM(s) Oral at bedtime  chlorhexidine 2% Cloths 1 Application(s) Topical at bedtime  clobetasol 0.05% Ointment 1 Application(s) Topical two times a day  clopidogrel Tablet 75 milliGRAM(s) Oral daily  heparin  Infusion.  Unit(s)/Hr (14 mL/Hr) IV Continuous <Continuous>  lisinopril 20 milliGRAM(s) Oral daily  metoprolol succinate ER 75 milliGRAM(s) Oral daily    MEDICATIONS  (PRN):  acetaminophen   Tablet .. 650 milliGRAM(s) Oral every 6 hours PRN Temp greater or equal to 38C (100.4F), Mild Pain (1 - 3), Moderate Pain (4 - 6), Severe Pain (7 - 10)  calamine Lotion 1 Application(s) Topical three times a day PRN Itching  heparin  Injectable 6500 Unit(s) IV Push every 6 hours PRN For aPTT less than 40  heparin  Injectable 3000 Unit(s) IV Push every 6 hours PRN For aPTT between 40 - 57    Allergies No Known Allergies    REVIEW OF SYSTEM:    Constitutional: denies fever, chills, fatigue  Neuro: denies headache, numbness, weakness, dizziness  Resp: denies cough, wheezing, shortness of breath  CVS: denies chest pain, palpitations, leg swelling  GI: denies abdominal pain, nausea, vomiting, diarrhea   : denies dysuria, frequency, incontinence  Skin: denies itching, burning, rashes, or lesions   Msk: denies joint pain or swelling    Vital Signs Last 24 Hrs  T(C): 36.6 (15 Aug 2019 08:00), Max: 36.9 (14 Aug 2019 19:00)  T(F): 97.9 (15 Aug 2019 08:00), Max: 98.5 (14 Aug 2019 19:00)  HR: 60 (15 Aug 2019 11:00) (51 - 75)  BP: 125/89 (15 Aug 2019 11:00) (120/79 - 158/105)  BP(mean): 100 (15 Aug 2019 11:00) (94 - 127)  RR: 24 (15 Aug 2019 11:00) (12 - 29)  SpO2: 100% (15 Aug 2019 08:00) (100% - 100%)    Physical Exam:  General : well developed, well nourished,  and no acute distress  Neuro : Alert and oriented x 3, no focal deficits  Lungs: Clear to Ascultation, no wheezing , rales or rhonchi   Cardiovascular : + 1 +2, RRR, no murmurs, no rubs  GI : abdomen soft, NT, ND, + BS   Extremities : No edema,  feet warm   Skin : warm dry     TELE: SB 50's  EKG:    LABS:                        14.1   12.1  )-----------( 252      ( 15 Aug 2019 05:30 )             45.7     08-15    136  |  102  |  24<H>  ----------------------------<  95  4.7   |  20<L>  |  1.03    Ca    9.5      15 Aug 2019 05:30  Phos  4.4     08-15  Mg     2.3     08-15    TPro  7.5  /  Alb  3.8  /  TBili  0.3  /  DBili  x   /  AST  179<H>  /  ALT  281<H>  /  AlkPhos  72  08-15    PT/INR - ( 15 Aug 2019 05:30 )   PT: 22.7 sec;   INR: 1.93 ratio       PTT - ( 15 Aug 2019 05:30 )  PTT:118.3 sec    RADIOLOGY & ADDITIONAL TESTS:  < from: Cardiac Cath Lab - Adult (08.10.19 @ 04:15) >  VENTRICLES: EF estimated was 25 %.  CORONARY VESSELS: The coronary circulation is right dominant.  LM:   --  LM: Normal.  LAD:   --  Proximal LAD: There was a 100 % stenosis.  CX:   --  Circumflex: Angiography showed minor luminal irregularities with  no flow limiting lesions.  RCA:   --  RCA: Angiography showed minor luminal irregularities with no  flow limiting lesions.  COMPLICATIONS: Therewere no complications.  DIAGNOSTIC RECOMMENDATIONS:  1. Successful PCI to the mLAD with a 3.0mm RADHA post-dilated to 3.5mm in the  setting of a STEMI.  2. DAPT.  INTERVENTIONAL RECOMMENDATIONS:  1. Successful PCI to the mLAD with a 3.0mm RADHA post-dilated to 3.5mm in the  setting of a STEMI.  2. DAPT.  Prepared and signed by  Shahid Rodriguez M.D.  < from: Transthoracic Echocardiogram (08.10.19 @ 08:07) >  1. The left ventricle is mildly dilated.  There is globaly  hypokinesis with minor regional variation.  The LVEF about  25%.  There is a filling defect in the apical septum with  Definity contrast (and suggested on the non-Defiinty image  #59), consistent with a left ventricular apical thrombus.  2. The right ventricle is not well visualized.  On the  parasternal and right ventricular outflow views the  function is normal.  However, not all the walls are well  seen as the apical views are limited.  3. There is no pericardial effusion.  4. There is no significant valvular regurgitation or  stenosis.  There are no prior studies available for comparison.  Results discussed with Dr. Reggie Santos on 8/10/2019 at  4:33PM when these results were seen.  ------------------------------------------------------------------------ HPI: no VT since 8/13/19, assessment completed with assist of  [place] [id number], [employer]     MEDICATIONS  (STANDING):  aspirin  chewable 81 milliGRAM(s) Oral daily  atorvastatin 80 milliGRAM(s) Oral at bedtime  chlorhexidine 2% Cloths 1 Application(s) Topical at bedtime  clobetasol 0.05% Ointment 1 Application(s) Topical two times a day  clopidogrel Tablet 75 milliGRAM(s) Oral daily  heparin  Infusion.  Unit(s)/Hr (14 mL/Hr) IV Continuous <Continuous>  lisinopril 20 milliGRAM(s) Oral daily  metoprolol succinate ER 75 milliGRAM(s) Oral daily    MEDICATIONS  (PRN):  acetaminophen   Tablet .. 650 milliGRAM(s) Oral every 6 hours PRN Temp greater or equal to 38C (100.4F), Mild Pain (1 - 3), Moderate Pain (4 - 6), Severe Pain (7 - 10)  calamine Lotion 1 Application(s) Topical three times a day PRN Itching  heparin  Injectable 6500 Unit(s) IV Push every 6 hours PRN For aPTT less than 40  heparin  Injectable 3000 Unit(s) IV Push every 6 hours PRN For aPTT between 40 - 57    Allergies No Known Allergies    REVIEW OF SYSTEM:    Constitutional: denies fever, chills, fatigue  Neuro: denies headache, numbness, weakness, dizziness  Resp: denies cough, wheezing, shortness of breath  CVS: denies chest pain, palpitations, leg swelling  GI: denies abdominal pain, nausea, vomiting, diarrhea   : denies dysuria, frequency, incontinence  Skin: denies itching, burning, rashes, or lesions   Msk: denies joint pain or swelling    Vital Signs Last 24 Hrs  T(C): 36.6 (15 Aug 2019 08:00), Max: 36.9 (14 Aug 2019 19:00)  T(F): 97.9 (15 Aug 2019 08:00), Max: 98.5 (14 Aug 2019 19:00)  HR: 60 (15 Aug 2019 11:00) (51 - 75)  BP: 125/89 (15 Aug 2019 11:00) (120/79 - 158/105)  BP(mean): 100 (15 Aug 2019 11:00) (94 - 127)  RR: 24 (15 Aug 2019 11:00) (12 - 29)  SpO2: 100% (15 Aug 2019 08:00) (100% - 100%)    Physical Exam:  General : well developed, well nourished,  and no acute distress  Neuro : Alert and oriented x 3, no focal deficits  Lungs: Clear to Ascultation, no wheezing , rales or rhonchi   Cardiovascular : + 1 +2, RRR, no murmurs, no rubs  GI : abdomen soft, NT, ND, + BS   Extremities : No edema,  feet warm   Skin : warm dry     TELE: SB 50's  EKG:    LABS:                        14.1   12.1  )-----------( 252      ( 15 Aug 2019 05:30 )             45.7     08-15    136  |  102  |  24<H>  ----------------------------<  95  4.7   |  20<L>  |  1.03    Ca    9.5      15 Aug 2019 05:30  Phos  4.4     08-15  Mg     2.3     08-15    TPro  7.5  /  Alb  3.8  /  TBili  0.3  /  DBili  x   /  AST  179<H>  /  ALT  281<H>  /  AlkPhos  72  08-15    PT/INR - ( 15 Aug 2019 05:30 )   PT: 22.7 sec;   INR: 1.93 ratio       PTT - ( 15 Aug 2019 05:30 )  PTT:118.3 sec    RADIOLOGY & ADDITIONAL TESTS:  < from: Cardiac Cath Lab - Adult (08.10.19 @ 04:15) >  VENTRICLES: EF estimated was 25 %.  CORONARY VESSELS: The coronary circulation is right dominant.  LM:   --  LM: Normal.  LAD:   --  Proximal LAD: There was a 100 % stenosis.  CX:   --  Circumflex: Angiography showed minor luminal irregularities with  no flow limiting lesions.  RCA:   --  RCA: Angiography showed minor luminal irregularities with no  flow limiting lesions.  COMPLICATIONS: Therewere no complications.  DIAGNOSTIC RECOMMENDATIONS:  1. Successful PCI to the mLAD with a 3.0mm RADHA post-dilated to 3.5mm in the  setting of a STEMI.  2. DAPT.  INTERVENTIONAL RECOMMENDATIONS:  1. Successful PCI to the mLAD with a 3.0mm RADHA post-dilated to 3.5mm in the  setting of a STEMI.  2. DAPT.  Prepared and signed by  Shahid Rodriguez M.D.  < from: Transthoracic Echocardiogram (08.10.19 @ 08:07) >  1. The left ventricle is mildly dilated.  There is globaly  hypokinesis with minor regional variation.  The LVEF about  25%.  There is a filling defect in the apical septum with  Definity contrast (and suggested on the non-Defiinty image  #59), consistent with a left ventricular apical thrombus.  2. The right ventricle is not well visualized.  On the  parasternal and right ventricular outflow views the  function is normal.  However, not all the walls are well  seen as the apical views are limited.  3. There is no pericardial effusion.  4. There is no significant valvular regurgitation or  stenosis.  There are no prior studies available for comparison.  Results discussed with Dr. Reggie Santos on 8/10/2019 at  4:33PM when these results were seen.  ------------------------------------------------------------------------

## 2019-08-15 NOTE — PROGRESS NOTE ADULT - SUBJECTIVE AND OBJECTIVE BOX
Patient is a 54y old  Male who presents with a chief complaint of NSTEMI (14 Aug 2019 21:16)    HPI:  54M tobacco user w/ no known PMH (has not seen a doctor in many years) who presented to [facility] with chest pain, noted to have elevated troponin and transferred to [facility]. Pt reports his symptoms began around noon while eating lunch. He reports 8/10 chest pain/pressure that radiated down his L arm and to his back. He felt short of breath during the episode. He went to the outside hospital where he received sublingual nitro and his symptoms improved. At this time his chest pain is 2-3/10 and he feels much improved. The pt denies any recent chest pain prior to today. No syncope, dyspnea, peripheral edema.     Of note, pt was recently in the woods/garden by a friends house 3d prior. States he had multiple mosquito bites all over his arm and exposure to bushes, does not know whether they looked like poison ivy but recalls that he was wearing a short-sleeved shirt and first noticed the rash on his b/l forearms. Afterwards, developed rash on his legs. The rash is itchy, although less now compared to before. Pt went to a doctor (?[facility]), where he was told he has poison ivy, was Rx'd a steroid taper (took 50mg x1 yesterday only), hydroxyzine PRN and bactrim. Has also been taking his friend's halobetasol which has helped.    At OSH,  /144. Pt was started on heparin and nitro GTT, received ASA/brilinta load, transferred to [facility] for further eval. probnp 816. Trop I 1.420.    ED Course:  /104  HR 94  RR 20 T 98.6F 97%02  In the ED, pt was resumed on heparin and nitro GTT. Transferred to CCU for monitoring of NSTEMI.   Currently denies CP, SOB or palpitations. Has some headaches after starting nitro. (10 Aug 2019 02:09)    Overnight Event:    REVIEW OF SYSTEMS:  	    MEDICATIONS  (STANDING):  aspirin  chewable 81 milliGRAM(s) Oral daily  atorvastatin 80 milliGRAM(s) Oral at bedtime  chlorhexidine 2% Cloths 1 Application(s) Topical at bedtime  clobetasol 0.05% Ointment 1 Application(s) Topical two times a day  clopidogrel Tablet 75 milliGRAM(s) Oral daily  heparin  Infusion.  Unit(s)/Hr (14 mL/Hr) IV Continuous <Continuous>  lisinopril 20 milliGRAM(s) Oral daily  metoprolol succinate ER 75 milliGRAM(s) Oral daily    MEDICATIONS  (PRN):  acetaminophen   Tablet .. 650 milliGRAM(s) Oral every 6 hours PRN Temp greater or equal to 38C (100.4F), Mild Pain (1 - 3), Moderate Pain (4 - 6), Severe Pain (7 - 10)  calamine Lotion 1 Application(s) Topical three times a day PRN Itching  heparin  Injectable 6500 Unit(s) IV Push every 6 hours PRN For aPTT less than 40  heparin  Injectable 3000 Unit(s) IV Push every 6 hours PRN For aPTT between 40 - 57        PHYSICAL EXAM:  Vital Signs Last 24 Hrs  T(C): 36.8 (15 Aug 2019 05:00), Max: 36.9 (14 Aug 2019 19:00)  T(F): 98.2 (15 Aug 2019 05:00), Max: 98.5 (14 Aug 2019 19:00)  HR: 58 (15 Aug 2019 07:00) (51 - 75)  BP: 153/87 (15 Aug 2019 07:00) (121/82 - 158/105)  BP(mean): 113 (15 Aug 2019 07:00) (94 - 127)  RR: 16 (15 Aug 2019 07:00) (12 - 29)  SpO2: 100% (14 Aug 2019 16:00) (100% - 100%)  I&O's Summary    14 Aug 2019 07:01  -  15 Aug 2019 07:00  --------------------------------------------------------  IN: 786 mL / OUT: 2150 mL / NET: -1364 mL        Appearance: Normal	  HEENT:   Normal oral mucosa, PERRL, EOMI	  Lymphatic: No lymphadenopathy  Cardiovascular: Normal S1 S2, No JVD, No murmurs, No edema  Respiratory: Lungs clear to auscultation	  Psychiatry: A & O x 3, Mood & affect appropriate  Gastrointestinal:  Soft, Non-tender, + BS	  Skin: No rashes, No ecchymoses, No cyanosis	  Neurologic: Non-focal  Extremities: Normal range of motion, No clubbing, cyanosis or edema  Vascular: Peripheral pulses palpable 2+ bilaterally    LABS:	 	                        14.1   12.1  )-----------( 252      ( 15 Aug 2019 05:30 )             45.7     Auto Eosinophil # x     / Auto Eosinophil % x     / Auto Neutrophil # x     / Auto Neutrophil % x     / BANDS % x                            14.1   13.6  )-----------( 254      ( 14 Aug 2019 06:23 )             42.7     Auto Eosinophil # x     / Auto Eosinophil % x     / Auto Neutrophil # x     / Auto Neutrophil % x     / BANDS % x        INR: 1.93 ratio (08-15 @ 05:30)  INR: 1.30 ratio (08-14 @ 06:23)  INR: 1.22 ratio (08-13 @ 01:28)    08-15    136  |  102  |  24<H>  ----------------------------<  95  4.7   |  20<L>  |  1.03  08-14    137  |  101  |  23  ----------------------------<  100<H>  4.3   |  19<L>  |  1.04    Ca    9.5      15 Aug 2019 05:30  Mg     2.3     08-15  Phos  4.4     08-15  TPro  7.5  /  Alb  3.8  /  TBili  0.3  /  DBili  x   /  AST  179<H>  /  ALT  281<H>  /  AlkPhos  72  08-15  TPro  8.2  /  Alb  4.1  /  TBili  0.4  /  DBili  x   /  AST  129<H>  /  ALT  244<H>  /  AlkPhos  75  08-14        proBNP:   Lipid Profile: 08-10 Chol 175 <H> HDL 50 Trig 88  HgA1c: 5.6 % (08-10 @ 10:21)    TSH: Thyroid Stimulating Hormone, Serum: 5.66 uIU/mL (08-10 @ 10:06)      CARDIAC MARKERS:   10 Aug 2019 12:45 Troponin x     / Creatine Kinase 171 U/L /  CKMB 14.1 ng/mL / CPK Mass Assay % 8.2 %   10 Aug 2019 06:20 Troponin x     / Creatine Kinase 183 U/L /  CKMB 18.5 ng/mL / CPK Mass Assay % 10.1 %   10 Aug 2019 00:50 Troponin x     / Creatine Kinase 208 U/L /  CKMB 18.6 ng/mL / CPK Mass Assay % 8.9 %        TELEMETRY: 	    ECG:  	  RADIOLOGY:  OTHER: 	    PREVIOUS DIAGNOSTIC TESTING:    [ ] Echocardiogram:  [ ]  Catheterization:  [ ] Stress Test:  	  	  JOSEPH Berrios  Contact # Patient is a 54y old  Male who presents with a chief complaint of NSTEMI (14 Aug 2019 21:16)    HPI:  54M tobacco user w/ no known PMH (has not seen a doctor in many years) who presented to [facility] with chest pain, noted to have elevated troponin and transferred to [facility]. Pt reports his symptoms began around noon while eating lunch. He reports 8/10 chest pain/pressure that radiated down his L arm and to his back. He felt short of breath during the episode. He went to the outside hospital where he received sublingual nitro and his symptoms improved. At this time his chest pain is 2-3/10 and he feels much improved. The pt denies any recent chest pain prior to today. No syncope, dyspnea, peripheral edema.   Of note, pt was recently in the woods/garden by a friends house 3d prior. States he had multiple mosquito bites all over his arm and exposure to bushes, does not know whether they looked like poison ivy but recalls that he was wearing a short-sleeved shirt and first noticed the rash on his b/l forearms. Afterwards, developed rash on his legs. The rash is itchy, although less now compared to before. Pt went to a doctor (?[facility]), where he was told he has poison ivy, was Rx'd a steroid taper (took 50mg x1 yesterday only), hydroxyzine PRN and bactrim. Has also been taking his friend's halobetasol which has helped.  At OSH,  /144. Pt was started on heparin and nitro GTT, received ASA/brilinta load, transferred to [facility] for further eval. probnp 816. Trop I 1.420.    Overnight Event: no acute issues overnight    REVIEW OF SYSTEMS: No SOB or chest pain but complained of dizziness when standing at times  	    MEDICATIONS  (STANDING):  aspirin  chewable 81 milliGRAM(s) Oral daily  atorvastatin 80 milliGRAM(s) Oral at bedtime  chlorhexidine 2% Cloths 1 Application(s) Topical at bedtime  clobetasol 0.05% Ointment 1 Application(s) Topical two times a day  clopidogrel Tablet 75 milliGRAM(s) Oral daily  heparin  Infusion.  Unit(s)/Hr (14 mL/Hr) IV Continuous <Continuous>  lisinopril 20 milliGRAM(s) Oral daily  metoprolol succinate ER 75 milliGRAM(s) Oral daily    MEDICATIONS  (PRN):  acetaminophen   Tablet .. 650 milliGRAM(s) Oral every 6 hours PRN Temp greater or equal to 38C (100.4F), Mild Pain (1 - 3), Moderate Pain (4 - 6), Severe Pain (7 - 10)  calamine Lotion 1 Application(s) Topical three times a day PRN Itching  heparin  Injectable 6500 Unit(s) IV Push every 6 hours PRN For aPTT less than 40  heparin  Injectable 3000 Unit(s) IV Push every 6 hours PRN For aPTT between 40 - 57      PHYSICAL EXAM:  Vital Signs Last 24 Hrs  T(C): 36.8 (15 Aug 2019 05:00), Max: 36.9 (14 Aug 2019 19:00)  T(F): 98.2 (15 Aug 2019 05:00), Max: 98.5 (14 Aug 2019 19:00)  HR: 58 (15 Aug 2019 07:00) (51 - 75)  BP: 153/87 (15 Aug 2019 07:00) (121/82 - 158/105)  BP(mean): 113 (15 Aug 2019 07:00) (94 - 127)  RR: 16 (15 Aug 2019 07:00) (12 - 29)  SpO2: 100% (14 Aug 2019 16:00) (100% - 100%)  I&O's Summary    14 Aug 2019 07:01  -  15 Aug 2019 07:00  --------------------------------------------------------  IN: 786 mL / OUT: 2150 mL / NET: -1364 mL      Appearance: Normal	  HEENT:   Normal oral mucosa, PERRL, EOMI	  Cardiovascular: Normal S1 S2, No JVD, No murmurs, No edema  Respiratory: Lungs clear to auscultation	  Psychiatry: A & O x 3, Mood & affect appropriate  Gastrointestinal:  Soft, Non-tender, + BS	  Skin: No rashes, No ecchymoses, No cyanosis	  Neurologic: Non-focal  Extremities: Normal range of motion, No clubbing, cyanosis or edema  Vascular: Peripheral pulses palpable 2+ bilaterally    LABS:	 	                        14.1   12.1  )-----------( 252      ( 15 Aug 2019 05:30 )             45.7     Auto Eosinophil # x     / Auto Eosinophil % x     / Auto Neutrophil # x     / Auto Neutrophil % x     / BANDS % x                            14.1   13.6  )-----------( 254      ( 14 Aug 2019 06:23 )             42.7     Auto Eosinophil # x     / Auto Eosinophil % x     / Auto Neutrophil # x     / Auto Neutrophil % x     / BANDS % x        INR: 1.93 ratio (08-15 @ 05:30)  INR: 1.30 ratio (08-14 @ 06:23)  INR: 1.22 ratio (08-13 @ 01:28)    08-15    136  |  102  |  24<H>  ----------------------------<  95  4.7   |  20<L>  |  1.03  08-14    137  |  101  |  23  ----------------------------<  100<H>  4.3   |  19<L>  |  1.04    Ca    9.5      15 Aug 2019 05:30  Mg     2.3     08-15  Phos  4.4     08-15  TPro  7.5  /  Alb  3.8  /  TBili  0.3  /  DBili  x   /  AST  179<H>  /  ALT  281<H>  /  AlkPhos  72  08-15  TPro  8.2  /  Alb  4.1  /  TBili  0.4  /  DBili  x   /  AST  129<H>  /  ALT  244<H>  /  AlkPhos  75  08-14      Lipid Profile: 08-10 Chol 175 <H> HDL 50 Trig 88  HgA1c: 5.6 % (08-10 @ 10:21)  TSH: Thyroid Stimulating Hormone, Serum: 5.66 uIU/mL (08-10 @ 10:06)      TELEMETRY: 	  No ectopy  ECG:   NORMAL SINUS RHYTHM  T WAVE ABNORMALITY in ANTEROLATERAL Leads      PREVIOUS DIAGNOSTIC TESTING:    [ ] Echocardiogram: < from: Transthoracic Echocardiogram (08.10.19 @ 08:07) >  CONCLUSIONS:  1. The left ventricle is mildly dilated.  There is globaly  hypokinesis with minor regional variation.  The LVEF about  25%.  There is a filling defect in the apical septum with  Definity contrast (and suggested on the non-Defiinty image  #59), consistent with a left ventricular apical thrombus.  2. The right ventricle is not well visualized.  On the  parasternal and right ventricular outflow views the  function is normal.  However, not all the walls are well  seen as the apical views are limited.  3. There is no pericardial effusion.  4. There is no significant valvular regurgitation or  stenosis.    < end of copied text >    [ ]  Catheterization: < from: Cardiac Cath Lab - Adult (08.10.19 @ 04:15) >  VENTRICLES: EF estimated was 25 %.  CORONARY VESSELS: The coronary circulation is right dominant.  LM:   --  LM: Normal.  LAD:   --  Proximal LAD: There was a 100 % stenosis.  CX:   --  Circumflex: Angiography showed minor luminal irregularities with  no flow limiting lesions.  RCA:   --  RCA: Angiography showed minor luminal irregularities with no  flow limiting lesions.  COMPLICATIONS: Therewere no complications.  DIAGNOSTIC RECOMMENDATIONS:  1. Successful PCI to the mLAD with a 3.0mm RADHA post-dilated to 3.5mm in the  setting of a STEMI.    < end of copied text >     	  ELOY BerriosSan Carlos Apache Tribe Healthcare CorporationJYOTI  Contact #[id number]

## 2019-08-16 LAB
ALBUMIN SERPL ELPH-MCNC: 3.9 G/DL — SIGNIFICANT CHANGE UP (ref 3.3–5)
ALP SERPL-CCNC: 80 U/L — SIGNIFICANT CHANGE UP (ref 40–120)
ALT FLD-CCNC: 355 U/L — HIGH (ref 10–45)
ANION GAP SERPL CALC-SCNC: 14 MMOL/L — SIGNIFICANT CHANGE UP (ref 5–17)
APTT BLD: 86.3 SEC — HIGH (ref 27.5–36.3)
AST SERPL-CCNC: 188 U/L — HIGH (ref 10–40)
BILIRUB SERPL-MCNC: 0.3 MG/DL — SIGNIFICANT CHANGE UP (ref 0.2–1.2)
BUN SERPL-MCNC: 25 MG/DL — HIGH (ref 7–23)
CALCIUM SERPL-MCNC: 9.4 MG/DL — SIGNIFICANT CHANGE UP (ref 8.4–10.5)
CHLORIDE SERPL-SCNC: 102 MMOL/L — SIGNIFICANT CHANGE UP (ref 96–108)
CO2 SERPL-SCNC: 19 MMOL/L — LOW (ref 22–31)
CREAT SERPL-MCNC: 0.98 MG/DL — SIGNIFICANT CHANGE UP (ref 0.5–1.3)
GLUCOSE SERPL-MCNC: 94 MG/DL — SIGNIFICANT CHANGE UP (ref 70–99)
HCT VFR BLD CALC: 44.7 % — SIGNIFICANT CHANGE UP (ref 39–50)
HGB BLD-MCNC: 14.3 G/DL — SIGNIFICANT CHANGE UP (ref 13–17)
INR BLD: 2.61 RATIO — HIGH (ref 0.88–1.16)
MAGNESIUM SERPL-MCNC: 2.2 MG/DL — SIGNIFICANT CHANGE UP (ref 1.6–2.6)
MCHC RBC-ENTMCNC: 29.2 PG — SIGNIFICANT CHANGE UP (ref 27–34)
MCHC RBC-ENTMCNC: 32 GM/DL — SIGNIFICANT CHANGE UP (ref 32–36)
MCV RBC AUTO: 91.2 FL — SIGNIFICANT CHANGE UP (ref 80–100)
PHOSPHATE SERPL-MCNC: 4.1 MG/DL — SIGNIFICANT CHANGE UP (ref 2.5–4.5)
PLATELET # BLD AUTO: 233 K/UL — SIGNIFICANT CHANGE UP (ref 150–400)
POTASSIUM SERPL-MCNC: 4.3 MMOL/L — SIGNIFICANT CHANGE UP (ref 3.5–5.3)
POTASSIUM SERPL-SCNC: 4.3 MMOL/L — SIGNIFICANT CHANGE UP (ref 3.5–5.3)
PROT SERPL-MCNC: 7.5 G/DL — SIGNIFICANT CHANGE UP (ref 6–8.3)
PROTHROM AB SERPL-ACNC: 30.6 SEC — HIGH (ref 10–12.9)
RBC # BLD: 4.9 M/UL — SIGNIFICANT CHANGE UP (ref 4.2–5.8)
RBC # FLD: 12.1 % — SIGNIFICANT CHANGE UP (ref 10.3–14.5)
SODIUM SERPL-SCNC: 135 MMOL/L — SIGNIFICANT CHANGE UP (ref 135–145)
WBC # BLD: 11.8 K/UL — HIGH (ref 3.8–10.5)
WBC # FLD AUTO: 11.8 K/UL — HIGH (ref 3.8–10.5)

## 2019-08-16 PROCEDURE — 93623 PRGRMD STIMJ&PACG IV RX NFS: CPT | Mod: 26

## 2019-08-16 PROCEDURE — 93621 COMP EP EVL L PAC&REC C SINS: CPT | Mod: 26

## 2019-08-16 PROCEDURE — 99233 SBSQ HOSP IP/OBS HIGH 50: CPT | Mod: GC

## 2019-08-16 PROCEDURE — 93010 ELECTROCARDIOGRAM REPORT: CPT

## 2019-08-16 PROCEDURE — 93620 COMP EP EVL R AT VEN PAC&REC: CPT | Mod: 26

## 2019-08-16 RX ORDER — WARFARIN SODIUM 2.5 MG/1
5 TABLET ORAL ONCE
Refills: 0 | Status: COMPLETED | OUTPATIENT
Start: 2019-08-16 | End: 2019-08-16

## 2019-08-16 RX ADMIN — Medication 1 APPLICATION(S): at 18:21

## 2019-08-16 RX ADMIN — WARFARIN SODIUM 5 MILLIGRAM(S): 2.5 TABLET ORAL at 22:32

## 2019-08-16 RX ADMIN — LISINOPRIL 20 MILLIGRAM(S): 2.5 TABLET ORAL at 05:19

## 2019-08-16 RX ADMIN — Medication 75 MILLIGRAM(S): at 18:21

## 2019-08-16 RX ADMIN — Medication 1 APPLICATION(S): at 05:20

## 2019-08-16 RX ADMIN — HEPARIN SODIUM 1200 UNIT(S)/HR: 5000 INJECTION INTRAVENOUS; SUBCUTANEOUS at 05:20

## 2019-08-16 RX ADMIN — CHLORHEXIDINE GLUCONATE 1 APPLICATION(S): 213 SOLUTION TOPICAL at 05:19

## 2019-08-16 RX ADMIN — CHLORHEXIDINE GLUCONATE 1 APPLICATION(S): 213 SOLUTION TOPICAL at 22:32

## 2019-08-16 RX ADMIN — ATORVASTATIN CALCIUM 80 MILLIGRAM(S): 80 TABLET, FILM COATED ORAL at 22:32

## 2019-08-16 RX ADMIN — Medication 81 MILLIGRAM(S): at 13:07

## 2019-08-16 RX ADMIN — CLOPIDOGREL BISULFATE 75 MILLIGRAM(S): 75 TABLET, FILM COATED ORAL at 13:07

## 2019-08-16 NOTE — PROGRESS NOTE ADULT - PROBLEM SELECTOR PLAN 1
s/p 1DES to p LAD  Pt remains CP free and hemodynamically stable  - Cont. ASA, Plavix, Lipitor, Toprol and Lisinopril   - DASH diet  - Ambulate as tolerated

## 2019-08-16 NOTE — PROGRESS NOTE ADULT - ASSESSMENT
54M tobacco user w/ no known PMH (has not seen a doctor in many years) p/w AWSTEMI s/p 1DES to p LAD c/b LV thrombus and NSVT  EP study pending

## 2019-08-16 NOTE — PROGRESS NOTE ADULT - SUBJECTIVE AND OBJECTIVE BOX
CCU2 NOTE    Admission date: 8/10/19  Chief complaint/ Diagnosis: AWSTEMI, c/b LV thrombus and NSVT   HPI: 54M tobacco user w/ no known PMH (has not seen a doctor in many years) p/w AWSTEMI s/p 1DES to p LAD c/b LV thrombus and NSVT  EP study pending    Interval history: Uneventful overnight  s/p coumadin 5mg po x 1  INR this morning 2.61  Off hep gtt @7:30am    REVIEW OF SYSTEMS  Denies CP, Palpitation, SOB, Dyspnea [ x ] All other systems negative    MEDICATIONS  (STANDING)  aspirin  chewable 81 milliGRAM(s) Oral daily  atorvastatin 80 milliGRAM(s) Oral at bedtime  chlorhexidine 2% Cloths 1 Application(s) Topical at bedtime  clobetasol 0.05% Ointment 1 Application(s) Topical two times a day  clopidogrel Tablet 75 milliGRAM(s) Oral daily  heparin  Infusion.  Unit(s)/Hr (14 mL/Hr) IV Continuous <Continuous>  lisinopril 20 milliGRAM(s) Oral daily  metoprolol succinate ER 75 milliGRAM(s) Oral daily    MEDICATIONS  (PRN)  acetaminophen   Tablet .. 650 milliGRAM(s) Oral every 6 hours PRN Temp greater or equal to 38C (100.4F), Mild Pain (1 - 3), Moderate Pain (4 - 6), Severe Pain (7 - 10)  calamine Lotion 1 Application(s) Topical three times a day PRN Itching  heparin  Injectable 6500 Unit(s) IV Push every 6 hours PRN For aPTT less than 40  heparin  Injectable 3000 Unit(s) IV Push every 6 hours PRN For aPTT between 40 - 57    Allergy: No Known Allergies    ICU Vital Signs Last 24 Hrs  T(C): 36.9 (Max: 37.1)  HR: 53 (53 - 80)  BP: 116/78 (108/67 - 169/90)  RR: 14  (14 - 362)  SpO2: 96%  (96% - 100%) in room air       I&O's Summary    15 Aug 2019 07:01  -  16 Aug 2019 07:00  --------------------------------------------------------  IN: 1044 mL / OUT: 1850 mL / NET: -806 mL      Daily     Daily Weight in k.3 (16 Aug 2019 05:00)    PHYSICAL EXAM  Appearance: Normal, NAD  HEAD:  Atraumatic, Normocephalic  EYES: EOMI, PERRLA, conjunctiva and sclera clear  NECK: Supple, No JVD  CHEST/LUNG: Clear to auscultation bilaterally; No wheezing  HEART: Normal S1, S2. No murmurs, rubs, or gallops  ABDOMEN: + Bowel sounds, Soft, NT, ND   EXTREMITIES:  2+ Peripheral Pulses, No clubbing, cyanosis, or edema  NEUROLOGY: non-focal, aaox3  Lymphatic: No lymphadenopathy  SKIN: No rashes or lesions    Interpretation of Telemetry:                        14.3   11.8  )-----------( 233                   44.7     135  |  102  |  25<H>  ----------------------------<  94  4.3   |  19<L>  |  0.98    Ca    9.4     Phos  4.1     Mg     2.2       TPro  7.5  /  Alb  3.9  /  TBili  0.3  /  DBili  x   /  AST  188<H>  /  ALT  355<H>  /  AlkPhos  80 CCU2 NOTE    Admission date: 8/10/19  Chief complaint/ Diagnosis: AWSTEMI, c/b LV thrombus and NSVT   HPI: 54M tobacco user w/ no known PMH (has not seen a doctor in many years) p/w AWSTEMI s/p 1DES to p LAD c/b LV thrombus and NSVT  EP study pending    Interval history: Uneventful overnight  s/p coumadin 5mg po x 1  INR this morning 2.61  Off hep gtt @7:30am    REVIEW OF SYSTEMS  Denies CP, Palpitation, SOB, Dyspnea [ x ] All other systems negative    MEDICATIONS  (STANDING)  aspirin  chewable 81 milliGRAM(s) Oral daily  atorvastatin 80 milliGRAM(s) Oral at bedtime  chlorhexidine 2% Cloths 1 Application(s) Topical at bedtime  clobetasol 0.05% Ointment 1 Application(s) Topical two times a day  clopidogrel Tablet 75 milliGRAM(s) Oral daily  heparin  Infusion.  Unit(s)/Hr (14 mL/Hr) IV Continuous <Continuous>  lisinopril 20 milliGRAM(s) Oral daily  metoprolol succinate ER 75 milliGRAM(s) Oral daily    MEDICATIONS  (PRN)  acetaminophen   Tablet .. 650 milliGRAM(s) Oral every 6 hours PRN Temp greater or equal to 38C (100.4F), Mild Pain (1 - 3), Moderate Pain (4 - 6), Severe Pain (7 - 10)  calamine Lotion 1 Application(s) Topical three times a day PRN Itching  heparin  Injectable 6500 Unit(s) IV Push every 6 hours PRN For aPTT less than 40  heparin  Injectable 3000 Unit(s) IV Push every 6 hours PRN For aPTT between 40 - 57    Allergy: No Known Allergies    ICU Vital Signs Last 24 Hrs  T(C): 36.9 (Max: 37.1)  HR: 53 (53 - 80)  BP: 116/78 (108/67 - 169/90)  RR: 14  (14 - 362)  SpO2: 96%  (96% - 100%) in room air       I&O's Summary    15 Aug 2019 07:01  -  16 Aug 2019 07:00  --------------------------------------------------------  IN: 1044 mL / OUT: 1850 mL / NET: -806 mL      Daily     Daily Weight in k.3 (16 Aug 2019 05:00)    PHYSICAL EXAM  Appearance: Normal, NAD  HEAD:  Atraumatic, Normocephalic  EYES: EOMI, PERRLA, conjunctiva and sclera clear  NECK: Supple, No JVD  CHEST/LUNG: Clear to auscultation bilaterally; No wheezing  HEART: Normal S1, S2. No murmurs, rubs, or gallops  ABDOMEN: + Bowel sounds, Soft, NT, ND   EXTREMITIES:  2+ Peripheral Pulses, No clubbing, cyanosis, or edema  NEUROLOGY: non-focal, aaox3  Lymphatic: No lymphadenopathy  SKIN: No rashes or lesions    Interpretation of Telemetry: SR w/ intermittent PVCs and nsvt                        14.3   11.8  )-----------( 233                   44.7     135  |  102  |  25<H>  ----------------------------<  94  4.3   |  19<L>  |  0.98    Ca    9.4     Phos  4.1     Mg     2.2       TPro  7.5  /  Alb  3.9  /  TBili  0.3  /  DBili  x   /  AST  188<H>  /  ALT  355<H>  /  AlkPhos  80

## 2019-08-16 NOTE — PROGRESS NOTE ADULT - PROBLEM SELECTOR PLAN 2
s/p echo 8/10 shows LV thrombus  s/p Coumadin 5mg x 1  INR this morning 2.61  Discussed with EP and hold Hep gtt for now

## 2019-08-16 NOTE — CHART NOTE - NSCHARTNOTEFT_GEN_A_CORE
Chart Transfer Note:     Hospital Course:     54M [place]  tobacco user w/ no known PMH (has not seen a doctor in many years) who presented to [facility] with chest pain, noted to have elevated troponin and transferred to [facility]. Pt reports his symptoms began around noon while eating lunch. He reports 8/10 chest pain/pressure that radiated down his L arm and to his back. He felt short of breath during the episode. He went to the outside hospital where he received sublingual nitro and his symptoms improved. At this time his chest pain is 2-3/10 and he feels much improved. The pt denies any recent chest pain prior to today.    States he had multiple mosquito bites all over his arm and exposure to bushes, does not know whether they looked like poison ivy but recalls that he was wearing a short-sleeved shirt and first noticed the rash on his b/l forearms. Afterwards, developed rash on his legs. The rash is itchy, although less now compared to before. Pt went to a doctor (?[facility]), where he was told he has poison ivy, was Rx'd a steroid taper (took 50mg x1 yesterday only), hydroxyzine PRN and bactrim. Has also been taking his friend's halobetasol which has helped.    At OSH,  /144. Pt was started on heparin and nitro GTT, received ASA/brilinta load, transferred to [facility] for further eval. probnp 816. Trop I 1.420.    [facility] CCU Course:   - Pt continued to have chest pain so was urgently taken to cath lab, was noted to have a 100% pLAD occulusion which was revascularized with PCI to pLAD. However his EF remained severely depressed and he was noted to have LV thrombus, was heparin bridged to coumadin . In addition, pt noted to have NSVT episodes, so EP was consulted for EP study. No inducible VT, so no ICD placed currently. Patient remains without chest pain at present.   In addition, dermatology was consulted, for poison ivy, related chornic dermatitis.     Assessment/Plan:     54 year old M pt with no significant PMH who presented as late presentation of NSTEMI, on cath found to have 100% pLAD lesion s/p RADHA pLAD.  Now w/ ICMO w/ EF<30% and LV thrombus.  Currently on coumadin, and heart failure regimen, Metoprolol 75 mg, Lisinopril 20mg, and DAPT .  Euvolemic.  No new tele episodes .  He is s/p EP study with no inducible VT.     Plan: -  # ischemic cardiomyopathy   - continue DAPT for atleast 1 year  - continue Atorvastatin 80 mg QHS   - continue Toprol XL 75 mg , may consider uptitratation  - continue lisinopril     #LV thrombus   - continue Coumdin   - keep INR 2-3      Humza Friend MD   CCU 2 Fellow. Chart Transfer Note:     Transfer to medicine  Accepting  Physician : Kirti Yo   Signout given to Dr Yo and [address] [place] [address]     Hospital Course:     54M [place]  tobacco user w/ no known PMH (has not seen a doctor in many years) who presented to [facility] with chest pain, noted to have elevated troponin and transferred to [facility]. Pt reports his symptoms began around noon while eating lunch. He reports 8/10 chest pain/pressure that radiated down his L arm and to his back. He felt short of breath during the episode. He went to the outside hospital where he received sublingual nitro and his symptoms improved. At this time his chest pain is 2-3/10 and he feels much improved. The pt denies any recent chest pain prior to today.    States he had multiple mosquito bites all over his arm and exposure to bushes, does not know whether they looked like poison ivy but recalls that he was wearing a short-sleeved shirt and first noticed the rash on his b/l forearms. Afterwards, developed rash on his legs. The rash is itchy, although less now compared to before. Pt went to a doctor (?[facility]), where he was told he has poison ivy, was Rx'd a steroid taper (took 50mg x1 yesterday only), hydroxyzine PRN and bactrim. Has also been taking his friend's halobetasol which has helped.    At OSH,  /144. Pt was started on heparin and nitro GTT, received ASA/brilinta load, transferred to [facility] for further eval. probnp 816. Trop I 1.420.    [facility] CCU Course:   - Pt continued to have chest pain so was urgently taken to cath lab, was noted to have a 100% pLAD occulusion which was revascularized with PCI to pLAD. However his EF remained severely depressed and he was noted to have LV thrombus, was heparin bridged to coumadin . In addition, pt noted to have NSVT episodes, so EP was consulted for EP study. No inducible VT, so no ICD placed currently. Patient remains without chest pain at present.   In addition, dermatology was consulted, for poison ivy, related chornic dermatitis.     Assessment/Plan:     54 year old M pt with no significant PMH who presented as late presentation of NSTEMI, on cath found to have 100% pLAD lesion s/p RADHA pLAD.  Now w/ ICMO w/ EF<30% and LV thrombus.  Currently on coumadin, and heart failure regimen, Metoprolol 75 mg, Lisinopril 20mg, and DAPT .  Euvolemic.  No new tele episodes .  He is s/p EP study with no inducible VT.     Plan: -  # ischemic cardiomyopathy   - continue DAPT for atleast 1 year  - continue Atorvastatin 80 mg QHS   - continue Toprol XL 75 mg , may consider uptitratation  - continue lisinopril     #LV thrombus   - continue Coumdin   - keep INR 2-3      Humza Friend MD   CCU 2 Fellow.

## 2019-08-16 NOTE — PROGRESS NOTE ADULT - SUBJECTIVE AND OBJECTIVE BOX
HPI:  no VT since 8/13/19, assessment completed with assist of  Margarita [id number], pacific interpreters     MEDICATIONS  (STANDING):  aspirin  chewable 81 milliGRAM(s) Oral daily  atorvastatin 80 milliGRAM(s) Oral at bedtime  chlorhexidine 2% Cloths 1 Application(s) Topical at bedtime  clobetasol 0.05% Ointment 1 Application(s) Topical two times a day  clopidogrel Tablet 75 milliGRAM(s) Oral daily  lisinopril 20 milliGRAM(s) Oral daily  metoprolol succinate ER 75 milliGRAM(s) Oral daily    MEDICATIONS  (PRN):  acetaminophen   Tablet .. 650 milliGRAM(s) Oral every 6 hours PRN Temp greater or equal to 38C (100.4F), Mild Pain (1 - 3), Moderate Pain (4 - 6), Severe Pain (7 - 10)  calamine Lotion 1 Application(s) Topical three times a day PRN Itching    Allergies No Known Allergies    REVIEW OF SYSTEM:    Constitutional: denies fever, chills, fatigue  Neuro: denies headache, numbness, weakness, dizziness  Resp: denies cough, wheezing, shortness of breath  CVS: denies chest pain, palpitations, leg swelling  GI: denies abdominal pain, nausea, vomiting, diarrhea   : denies dysuria, frequency, incontinence  Skin: denies itching, burning, rashes, or lesions   Msk: denies joint pain or swelling    Vital Signs Last 24 Hrs  T(C): 36.9 (16 Aug 2019 07:00), Max: 37.1 (15 Aug 2019 21:00)  T(F): 98.4 (16 Aug 2019 07:00), Max: 98.7 (15 Aug 2019 21:00)  HR: 60 (16 Aug 2019 09:00) (53 - 80)  BP: 142/78 (16 Aug 2019 09:00) (108/67 - 169/90)  BP(mean): 102 (16 Aug 2019 09:00) (76 - 121)  RR: 16 (16 Aug 2019 09:00) (14 - 362)  SpO2: 97% (16 Aug 2019 08:00) (96% - 100%)    Physical Exam:  General : well developed, well nourished,  and no acute distress  Neuro : Alert and oriented x 3, no focal deficits  Lungs: Clear to Ascultation, no wheezing , rales or rhonchi   Cardiovascular : + 1 +2, RRR, no murmurs, no rubs  GI : abdomen soft, NT, ND, + BS   Extremities : No edema,  feet warm   Skin : warm dry     TELE: SB 50's  EKG:    LABS:                        14.3   11.8  )-----------( 233      ( 16 Aug 2019 04:22 )             44.7     08-16    135  |  102  |  25<H>  ----------------------------<  94  4.3   |  19<L>  |  0.98    Ca    9.4      16 Aug 2019 04:22  Phos  4.1     08-16  Mg     2.2     08-16    TPro  7.5  /  Alb  3.9  /  TBili  0.3  /  DBili  x   /  AST  188<H>  /  ALT  355<H>  /  AlkPhos  80  08-16    PT/INR - ( 16 Aug 2019 04:21 )   PT: 30.6 sec;   INR: 2.61 ratio         PTT - ( 16 Aug 2019 04:21 )  PTT:86.3 sec  RADIOLOGY & ADDITIONAL TESTS:  < from: Cardiac Cath Lab - Adult (08.10.19 @ 04:15) >  VENTRICLES: EF estimated was 25 %.  CORONARY VESSELS: The coronary circulation is right dominant.  LM:   --  LM: Normal.  LAD:   --  Proximal LAD: There was a 100 % stenosis.  CX:   --  Circumflex: Angiography showed minor luminal irregularities with  no flow limiting lesions.  RCA:   --  RCA: Angiography showed minor luminal irregularities with no  flow limiting lesions.  COMPLICATIONS: Therewere no complications.  DIAGNOSTIC RECOMMENDATIONS:  1. Successful PCI to the mLAD with a 3.0mm RADHA post-dilated to 3.5mm in the  setting of a STEMI.  2. DAPT.  INTERVENTIONAL RECOMMENDATIONS:  1. Successful PCI to the mLAD with a 3.0mm RADHA post-dilated to 3.5mm in the  setting of a STEMI.  2. DAPT.  Prepared and signed by  Shahid Rodriguez M.D.  < from: Transthoracic Echocardiogram (08.10.19 @ 08:07) >  1. The left ventricle is mildly dilated.  There is globaly  hypokinesis with minor regional variation.  The LVEF about  25%.  There is a filling defect in the apical septum with  Definity contrast (and suggested on the non-Defiinty image  #59), consistent with a left ventricular apical thrombus.  2. The right ventricle is not well visualized.  On the  parasternal and right ventricular outflow views the  function is normal.  However, not all the walls are well  seen as the apical views are limited.  3. There is no pericardial effusion.  4. There is no significant valvular regurgitation or  stenosis.  There are no prior studies available for comparison.  Results discussed with Dr. Reggie Santos on 8/10/2019 at  4:33PM when these results were seen.  ------------------------------------------------------------------------

## 2019-08-16 NOTE — PROGRESS NOTE ADULT - PROBLEM SELECTOR PLAN 1
Plan: 23 beat run of VT >200 bpm late post AWSTEMI,  ICM with likely new low EF 25 %  continue  GDMT including Toprol 75mg po daily, and max dose Lisinopril 20 mg daily   Monitor and replete electrolytes for goal Mg>++2; K>+4  maintain NPO EP study +/- ICD,  If monomorphic VT is  inducible he would be a candidate for an ICD implant without waiting the traditional 90 days, if EPS is negative continue beta blockers with reassessment of LV function in 3 months if remains <35% then ICD implant at that time would be appropriate. Due to late  revascularization recovery of LV function less likely.  need current type and screen. Plans discussed wit patient who is agreeable via  Margarita [id number], pacific interpreters

## 2019-08-16 NOTE — PROGRESS NOTE ADULT - SUBJECTIVE AND OBJECTIVE BOX
DESEANLAFRANK SIBLEYLANDYDEAN  [id number]    PROCEDURE:  EP Study        INDICATION:  NSVT  MI        ELECTROPHYSIOLOGIST(S):  MD Brooklynn Goins MD        FINDINGS:  - Normal AV conduction  - Non-inducible for VT      COMPLICATIONS:  None      RECOMMENDATIONS:  - Resume all medications  - No indication for ICD at this time. CAMILLA JIMENEZDEAN  [id number]    PROCEDURE:  EP Study        INDICATION:  NSVT  MI        ELECTROPHYSIOLOGIST(S):  MD Brooklynn Goins MD        FINDINGS:  - Normal AV conduction  - Non-inducible for VT      COMPLICATIONS:  None      RECOMMENDATIONS:  - Resume all medications  - No indication for ICD at this time  - Bedrest for 4 hours

## 2019-08-16 NOTE — PROGRESS NOTE ADULT - ASSESSMENT
55 y/o Male, active smoker with no known PMHx,  transferred from [facility] with chest pain;  found to have AWSTEMI. Left heart cath  8/10  revealed  100% stenosis in proximal LAD. Patient now s/p 1 RADHA to mLAD. Hospital course c/b LV apical thrombus and 23 beats NSVT in the setting of new EF of 25 %.

## 2019-08-16 NOTE — CHART NOTE - NSCHARTNOTEFT_GEN_A_CORE
54 year old M pt with no significant PMH who presented as late presentation of NSTEMI, on cath found to have 100% pLAD lesion s/p RADHA pLAD.  Now w/ ICMO w/ EF<30% and LV thrombus.  Currently on coumadin, and heart failure regimen, Entresto 24/26 , Metoprolol 75 mg, Lisinopril 20mg, and DAPT .  Euvolemic.  No new tele episodes .  He is s/p EP study with no inducible VT.   Plan: -  - continue above medication regimen  - may consider uptitrating Entresto and metoprolol eventually.     Humza Friend MD   CCU 2 Fellow

## 2019-08-16 NOTE — PROGRESS NOTE ADULT - PROBLEM SELECTOR PLAN 2
LV apical Thrombus  Continue Coumadin for AC for LV Apical thrombus goal 2-3   currently on DAPT s/p PCI to mLAD 8/10/19    [id number] LV apical Thrombus  Continue Coumadin for AC for LV Apical thrombus goal 2-3   currently on DAPT s/p PCI to mLAD 8/10/19    [phone]  addendum   EP study with non inducible for VT no indication for ICD at this time  EP signing off

## 2019-08-17 DIAGNOSIS — E78.49 OTHER HYPERLIPIDEMIA: ICD-10-CM

## 2019-08-17 DIAGNOSIS — I21.4 NON-ST ELEVATION (NSTEMI) MYOCARDIAL INFARCTION: ICD-10-CM

## 2019-08-17 DIAGNOSIS — L23.7 ALLERGIC CONTACT DERMATITIS DUE TO PLANTS, EXCEPT FOOD: ICD-10-CM

## 2019-08-17 DIAGNOSIS — I10 ESSENTIAL (PRIMARY) HYPERTENSION: ICD-10-CM

## 2019-08-17 DIAGNOSIS — I47.2 VENTRICULAR TACHYCARDIA: ICD-10-CM

## 2019-08-17 LAB
ANION GAP SERPL CALC-SCNC: 14 MMOL/L — SIGNIFICANT CHANGE UP (ref 5–17)
APTT BLD: 39.3 SEC — HIGH (ref 27.5–36.3)
BUN SERPL-MCNC: 27 MG/DL — HIGH (ref 7–23)
CALCIUM SERPL-MCNC: 9.5 MG/DL — SIGNIFICANT CHANGE UP (ref 8.4–10.5)
CHLORIDE SERPL-SCNC: 100 MMOL/L — SIGNIFICANT CHANGE UP (ref 96–108)
CO2 SERPL-SCNC: 18 MMOL/L — LOW (ref 22–31)
CREAT SERPL-MCNC: 1.04 MG/DL — SIGNIFICANT CHANGE UP (ref 0.5–1.3)
GLUCOSE SERPL-MCNC: 83 MG/DL — SIGNIFICANT CHANGE UP (ref 70–99)
HCT VFR BLD CALC: 49.6 % — SIGNIFICANT CHANGE UP (ref 39–50)
HGB BLD-MCNC: 15.7 G/DL — SIGNIFICANT CHANGE UP (ref 13–17)
INR BLD: 2.37 RATIO — HIGH (ref 0.88–1.16)
MAGNESIUM SERPL-MCNC: 2.5 MG/DL — SIGNIFICANT CHANGE UP (ref 1.6–2.6)
MCHC RBC-ENTMCNC: 29.1 PG — SIGNIFICANT CHANGE UP (ref 27–34)
MCHC RBC-ENTMCNC: 31.6 GM/DL — LOW (ref 32–36)
MCV RBC AUTO: 92.2 FL — SIGNIFICANT CHANGE UP (ref 80–100)
PHOSPHATE SERPL-MCNC: 4.1 MG/DL — SIGNIFICANT CHANGE UP (ref 2.5–4.5)
PLATELET # BLD AUTO: 288 K/UL — SIGNIFICANT CHANGE UP (ref 150–400)
POTASSIUM SERPL-MCNC: 4.4 MMOL/L — SIGNIFICANT CHANGE UP (ref 3.5–5.3)
POTASSIUM SERPL-SCNC: 4.4 MMOL/L — SIGNIFICANT CHANGE UP (ref 3.5–5.3)
PROTHROM AB SERPL-ACNC: 28 SEC — HIGH (ref 10–12.9)
RBC # BLD: 5.38 M/UL — SIGNIFICANT CHANGE UP (ref 4.2–5.8)
RBC # FLD: 12.1 % — SIGNIFICANT CHANGE UP (ref 10.3–14.5)
SODIUM SERPL-SCNC: 132 MMOL/L — LOW (ref 135–145)
WBC # BLD: 13.5 K/UL — HIGH (ref 3.8–10.5)
WBC # FLD AUTO: 13.5 K/UL — HIGH (ref 3.8–10.5)

## 2019-08-17 PROCEDURE — 99233 SBSQ HOSP IP/OBS HIGH 50: CPT

## 2019-08-17 RX ORDER — WARFARIN SODIUM 2.5 MG/1
5 TABLET ORAL ONCE
Refills: 0 | Status: COMPLETED | OUTPATIENT
Start: 2019-08-17 | End: 2019-08-17

## 2019-08-17 RX ADMIN — Medication 81 MILLIGRAM(S): at 11:14

## 2019-08-17 RX ADMIN — Medication 1 APPLICATION(S): at 05:23

## 2019-08-17 RX ADMIN — ATORVASTATIN CALCIUM 80 MILLIGRAM(S): 80 TABLET, FILM COATED ORAL at 22:20

## 2019-08-17 RX ADMIN — Medication 75 MILLIGRAM(S): at 11:14

## 2019-08-17 RX ADMIN — CLOPIDOGREL BISULFATE 75 MILLIGRAM(S): 75 TABLET, FILM COATED ORAL at 11:14

## 2019-08-17 RX ADMIN — LISINOPRIL 20 MILLIGRAM(S): 2.5 TABLET ORAL at 05:22

## 2019-08-17 RX ADMIN — Medication 1 APPLICATION(S): at 17:27

## 2019-08-17 RX ADMIN — WARFARIN SODIUM 5 MILLIGRAM(S): 2.5 TABLET ORAL at 22:20

## 2019-08-17 RX ADMIN — CHLORHEXIDINE GLUCONATE 1 APPLICATION(S): 213 SOLUTION TOPICAL at 22:21

## 2019-08-17 NOTE — CHART NOTE - NSCHARTNOTEFT_GEN_A_CORE
MAR Transfer note    Transfer from: CCU  Transfer to:  Telemetry    CCU COURSE/HPI:  53 yo M [place]  tobacco user w/ no known PMH (has not seen a doctor in many years) who presented to [place] with chest pain, SOB, +troponins, transferred for cath. Pt had 100% pLAD occlusion, revascularized w/PCI. EF depressed (25%), c/b LV thrombus; heparin bridged to coumadin. Hosp course also c/b NSVT - EP study revealed no inducible VT so no ICD.    Assessment/Plan:     54 year old M pt with no significant PMH who presented as late presentation of NSTEMI, on cath found to have 100% pLAD lesion s/p RADHA pLAD.  Now w/ ICMO w/ EF<30% and LV thrombus.  Currently on coumadin, and heart failure regimen, Metoprolol 75 mg, Lisinopril 20mg, and DAPT .  Euvolemic.  No new tele episodes .  He is s/p EP study with no inducible VT.     Plan: -  # ischemic cardiomyopathy   - continue DAPT for atleast 1 year  - continue Atorvastatin 80 mg QHS   - continue Toprol XL 75 mg , may consider uptitratation  - continue lisinopril     #LV thrombus   - continue Coumdin   - keep INR 2-3    #Skin lesions: dermatitis 2/2 poison ivy  - f/u derm recs      Vital Signs Last 24 Hrs  T(C): 36.8 (17 Aug 2019 00:13), Max: 37.1 (16 Aug 2019 20:00)  T(F): 98.3 (17 Aug 2019 00:13), Max: 98.7 (16 Aug 2019 20:00)  HR: 63 (17 Aug 2019 00:13) (53 - 84)  BP: 136/89 (17 Aug 2019 00:13) (107/70 - 149/88)  BP(mean): 111 (17 Aug 2019 00:00) (81 - 114)  RR: 19 (17 Aug 2019 00:13) (14 - 28)  SpO2: 100% (17 Aug 2019 00:13) (97% - 100%)  I&O's Summary    15 Aug 2019 07:01  -  16 Aug 2019 07:00  --------------------------------------------------------  IN: 1044 mL / OUT: 1850 mL / NET: -806 mL    16 Aug 2019 07:01  -  17 Aug 2019 00:47  --------------------------------------------------------  IN: 720 mL / OUT: 0 mL / NET: 720 mL    On physical exam:  Gen: NAD  Chest/CV: RRR, +2 peripheral pulses  Pulm: CTAB/L  Abd: soft, NT, ND +BS  MSK: no peripheral edema/cyanosis   Skin: multiple red excoriations and ruptured blisters, non-tender over forearms B/L      MEDICATIONS  (STANDING):  aspirin  chewable 81 milliGRAM(s) Oral daily  atorvastatin 80 milliGRAM(s) Oral at bedtime  chlorhexidine 2% Cloths 1 Application(s) Topical at bedtime  clobetasol 0.05% Ointment 1 Application(s) Topical two times a day  clopidogrel Tablet 75 milliGRAM(s) Oral daily  lisinopril 20 milliGRAM(s) Oral daily  metoprolol succinate ER 75 milliGRAM(s) Oral daily    MEDICATIONS  (PRN):  acetaminophen   Tablet .. 650 milliGRAM(s) Oral every 6 hours PRN Temp greater or equal to 38C (100.4F), Mild Pain (1 - 3), Moderate Pain (4 - 6), Severe Pain (7 - 10)  calamine Lotion 1 Application(s) Topical three times a day PRN Itching        LABS                                            14.3                  Neurophils% (auto):   x      (08-16 @ 04:22):    11.8 )-----------(233          Lymphocytes% (auto):  x                                             44.7                   Eosinphils% (auto):   x        Manual%: Neutrophils x    ; Lymphocytes x    ; Eosinophils x    ; Bands%: x    ; Blasts x                                    135    |  102    |  25                  Calcium: 9.4   / iCa: x      (08-16 @ 04:22)    ----------------------------<  94        Magnesium: 2.2                              4.3     |  19     |  0.98             Phosphorous: 4.1      TPro  7.5    /  Alb  3.9    /  TBili  0.3    /  DBili  x      /  AST  188    /  ALT  355    /  AlkPhos  80     16 Aug 2019 04:22    ( 08-16 @ 04:21 )   PT: 30.6 sec;   INR: 2.61 ratio  aPTT: 86.3 sec    Heide Early MD, PGY-3  MAR  [id number]

## 2019-08-17 NOTE — PROGRESS NOTE ADULT - SUBJECTIVE AND OBJECTIVE BOX
For all Cardiology service contact information, go to [email or web address] and use "[email or web address]" to login.      Overnight Events: Patient says that he is having some chest pain that has been improving. Has developed a rash.    Medications:  acetaminophen   Tablet .. 650 milliGRAM(s) Oral every 6 hours PRN  aspirin  chewable 81 milliGRAM(s) Oral daily  atorvastatin 80 milliGRAM(s) Oral at bedtime  calamine Lotion 1 Application(s) Topical three times a day PRN  chlorhexidine 2% Cloths 1 Application(s) Topical at bedtime  clobetasol 0.05% Ointment 1 Application(s) Topical two times a day  clopidogrel Tablet 75 milliGRAM(s) Oral daily  lisinopril 20 milliGRAM(s) Oral daily  metoprolol succinate ER 75 milliGRAM(s) Oral daily      REVIEW OF SYSTEMS:  CONSTITUTIONAL: No weakness, fevers or chills  EYES/ENT: No visual changes;  No vertigo or throat pain   NECK: No pain or stiffness  RESPIRATORY: No cough, wheezing, hemoptysis; No shortness of breath  CARDIOVASCULAR: +chest pain  GASTROINTESTINAL: No abdominal pain. No nausea, vomiting, or hematemesis; No diarrhea or constipation. No melena or hematochezia.  GENITOURINARY: No dysuria, frequency or hematuria  NEUROLOGICAL: No numbness or weakness  SKIN: +itching/rash  All other review of systems is negative unless indicated above    Vitals:  T(F): 98.3 (08-17), Max: 98.7 (08-16)  HR: 59 (08-17) (53 - 84)  BP: 116/79 (08-17) (107/70 - 149/88)  RR: 18 (08-17)  SpO2: 97% (08-17)  I&O's Summary    16 Aug 2019 07:01  -  17 Aug 2019 07:00  --------------------------------------------------------  IN: 720 mL / OUT: 400 mL / NET: 320 mL      Physical Exam:  Appearance: No acute distress; well appearing  Eyes: PERRL, EOMI, pink conjunctiva  HENT: Normal oral muscosa  Cardiovascular: RRR, S1, S2, no murmurs, rubs, or gallops; no edema; no JVD  Respiratory: Clear to auscultation bilaterally  Gastrointestinal: soft, non-tender, non-distended with normal bowel sounds  Musculoskeletal: No clubbing; no joint deformity   Neurologic: Non-focal  Lymphatic: No lymphadenopathy  Psychiatry: AAOx3, mood & affect appropriate  Skin: diffuse pruritic rash                          15.7   13.5  )-----------( 288      ( 17 Aug 2019 06:36 )             49.6     08-17    132<L>  |  100  |  27<H>  ----------------------------<  83  4.4   |  18<L>  |  1.04    Ca    9.5      17 Aug 2019 06:51  Phos  4.1     08-17  Mg     2.5     08-17    TPro  7.5  /  Alb  3.9  /  TBili  0.3  /  DBili  x   /  AST  188<H>  /  ALT  355<H>  /  AlkPhos  80  08-16    PT/INR - ( 17 Aug 2019 06:35 )   PT: 28.0 sec;   INR: 2.37 ratio      PTT - ( 17 Aug 2019 06:35 )  PTT:39.3 sec    Interpretation of Telemetry:  SR 50-60, NSVT 5 beats For all Cardiology service contact information, go to [email or web address] and use "[employer]" to login.      Overnight Events: Itchy skin.  No CP or SOB.  No issues with breakfast.      Medications:  acetaminophen   Tablet .. 650 milliGRAM(s) Oral every 6 hours PRN  aspirin  chewable 81 milliGRAM(s) Oral daily  atorvastatin 80 milliGRAM(s) Oral at bedtime  calamine Lotion 1 Application(s) Topical three times a day PRN  chlorhexidine 2% Cloths 1 Application(s) Topical at bedtime  clobetasol 0.05% Ointment 1 Application(s) Topical two times a day  clopidogrel Tablet 75 milliGRAM(s) Oral daily  lisinopril 20 milliGRAM(s) Oral daily  metoprolol succinate ER 75 milliGRAM(s) Oral daily      REVIEW OF SYSTEMS:  CONSTITUTIONAL: No weakness, fevers or chills  EYES/ENT: No visual changes;  No vertigo or throat pain   NECK: No pain or stiffness  RESPIRATORY: No cough, wheezing, hemoptysis; No shortness of breath  CARDIOVASCULAR: +chest pain  GASTROINTESTINAL: No abdominal pain. No nausea, vomiting, or hematemesis; No diarrhea or constipation. No melena or hematochezia.  GENITOURINARY: No dysuria, frequency or hematuria  NEUROLOGICAL: No numbness or weakness  SKIN: +itching/rash  All other review of systems is negative unless indicated above    Vitals:  T(F): 98.3 (08-17), Max: 98.7 (08-16)  HR: 59 (08-17) (53 - 84)  BP: 116/79 (08-17) (107/70 - 149/88)  RR: 18 (08-17)  SpO2: 97% (08-17)  I&O's Summary    16 Aug 2019 07:01  -  17 Aug 2019 07:00  --------------------------------------------------------  IN: 720 mL / OUT: 400 mL / NET: 320 mL      Physical Exam:  Appearance: No acute distress; well appearing  Eyes: PERRL, EOMI, pink conjunctiva  HENT: Normal oral muscosa  Cardiovascular: RRR, S1, S2, no murmurs, rubs, or gallops; no edema; no JVD  Respiratory: Clear to auscultation bilaterally  Gastrointestinal: soft, non-tender, non-distended with normal bowel sounds  Musculoskeletal: No clubbing; no joint deformity   Neurologic: Non-focal  Lymphatic: No lymphadenopathy  Psychiatry: AAOx3, mood & affect appropriate  Skin: diffuse pruritic rash                          15.7   13.5  )-----------( 288      ( 17 Aug 2019 06:36 )             49.6     08-17    132<L>  |  100  |  27<H>  ----------------------------<  83  4.4   |  18<L>  |  1.04    Ca    9.5      17 Aug 2019 06:51  Phos  4.1     08-17  Mg     2.5     08-17    TPro  7.5  /  Alb  3.9  /  TBili  0.3  /  DBili  x   /  AST  188<H>  /  ALT  355<H>  /  AlkPhos  80  08-16    PT/INR - ( 17 Aug 2019 06:35 )   PT: 28.0 sec;   INR: 2.37 ratio      PTT - ( 17 Aug 2019 06:35 )  PTT:39.3 sec    Interpretation of Telemetry:  SR 50-60, NSVT 5 beats

## 2019-08-17 NOTE — PROVIDER CONTACT NOTE (OTHER) - ASSESSMENT
Pt's is A&ox4 Denies any chest pain/ discomfort. VVS. Temp. 98.3 F, BP: 116/79, HR: 59. Pt was asleep at time of ectopy

## 2019-08-17 NOTE — PROGRESS NOTE ADULT - ASSESSMENT
Patient is a 54 year old male Swedish speaking current smoker, no known PMH 2/2 not seeing a doctor for many years who presented to [place] with chest pain with elevated troponin found to have hypertensive emergency and NSTEMI transferred to [facility] for management,  now s/p [facility] 8/10 showing 100% occlusion to mLAD s/p RADHA x1. Course complicated by NSVT s/p EP study that did not show inducible VT. Course further complicated by LV thrombus.

## 2019-08-17 NOTE — PROGRESS NOTE ADULT - PROBLEM SELECTOR PLAN 4
- c/w clobetasol per dermatology  - derm did recommend steroid taper, however not given per cardiology

## 2019-08-17 NOTE — PROGRESS NOTE ADULT - ASSESSMENT
54 year old M pt with no significant PMH who presented as late presentation of NSTEMI found to have 100% pLAD lesion s/p RADHA. Now with HFrEF and LV thrombus.    NSTEMI s/p RADHA to pLAD now with HFrEF (25%), euvolemic.    - continue DAPT  - continue atorvastatin 80    - continue Toprol XL and lisinopril     LV thrombus. INR at goal.  - continue Coumdin   - keep INR 2-3     NSVT. EP study showed no inducible VT.  - cont BB as above      Dewey Artis MD  Cardiology Fellow   [phone]  For all Cardiology service contact information, go to [email or web address] and use "[email or web address]" to login. 54 year old M pt with no significant PMH who presented as late presentation of NSTEMI found to have 100% pLAD lesion s/p RADHA. Now with HFrEF and LV thrombus.    NSTEMI s/p RADHA to pLAD now with HFrEF (25%), euvolemic.    - continue DAPT  - continue atorvastatin 80    - continue Toprol XL and lisinopril     LV thrombus. INR at goal.  - continue Coumdin   - keep INR 2-3     NSVT. EP study showed no inducible VT.  - cont BB as above      Dewey Artis MD  Cardiology Fellow   [phone]  For all Cardiology service contact information, go to [email or web address] and use "[email or web address]" to login.

## 2019-08-17 NOTE — PROGRESS NOTE ADULT - PROBLEM SELECTOR PLAN 6
- c/w lipitor 80 mg daily    disposition: dc pending clearance from cardiology    discussed with katrina at [phone]

## 2019-08-17 NOTE — PROGRESS NOTE ADULT - SUBJECTIVE AND OBJECTIVE BOX
Patient denies chest pain, shortness of breath or palpitations. He does endorse itchiness in b/l arms.     GENERAL: No fevers, no chills.  EYES: No blurry vision,  No photophobia  ENT: No sore throat.  No dysphagia  Cardiovascular: No chest pain, palpitations, orthopnea  Pulmonary: No cough, no wheezing. No shortness of breath  Gastrointestinal: No abdominal pain, no diarrhea, no constipation.    Musculoskeletal:  + itchiness in b/l arms. No weakness.  No myalgias.  Dermatology:  No rashes.  Neuro: No Headache.  No vertigo.  No dizziness.  Psych: No anxiety, no depression.  Denies suicidal thoughts.    MEDICATIONS  (STANDING):  aspirin  chewable 81 milliGRAM(s) Oral daily  atorvastatin 80 milliGRAM(s) Oral at bedtime  chlorhexidine 2% Cloths 1 Application(s) Topical at bedtime  clobetasol 0.05% Ointment 1 Application(s) Topical two times a day  clopidogrel Tablet 75 milliGRAM(s) Oral daily  lisinopril 20 milliGRAM(s) Oral daily  metoprolol succinate ER 75 milliGRAM(s) Oral daily  warfarin 5 milliGRAM(s) Oral once    MEDICATIONS  (PRN):  acetaminophen   Tablet .. 650 milliGRAM(s) Oral every 6 hours PRN Temp greater or equal to 38C (100.4F), Mild Pain (1 - 3), Moderate Pain (4 - 6), Severe Pain (7 - 10)  calamine Lotion 1 Application(s) Topical three times a day PRN Itching    Vital Signs Last 24 Hrs  T(C): 36.8 (17 Aug 2019 11:00), Max: 37.1 (16 Aug 2019 20:00)  T(F): 98.2 (17 Aug 2019 11:00), Max: 98.7 (16 Aug 2019 20:00)  HR: 81 (17 Aug 2019 11:00) (57 - 84)  BP: 112/79 (17 Aug 2019 11:00) (107/70 - 149/88)  BP(mean): 111 (17 Aug 2019 00:00) (81 - 114)  RR: 18 (17 Aug 2019 11:00) (15 - 28)  SpO2: 98% (17 Aug 2019 11:00) (97% - 100%)    GENERAL: NAD, well-developed  HEAD:  Atraumatic, Normocephalic  EYES: EOMI, PERRLA, conjunctiva and sclera clear  ENT: Pharynx not erythematous  PULMONARY: Clear to auscultation bilaterally; No wheeze  CARDIOVASCULAR: Regular rate and rhythm; No murmurs, rubs, or gallops  ABDOMEN: Soft, Nontender, Nondistended; Bowel sounds present  EXTREMITIES:  2+ Peripheral Pulses, No clubbing, cyanosis, or edema  MUSCULOSKELETAL: No calf tenderness  PSYCH: AAOx3, normal affect  SKIN: erythematous raises patches all over arms b/l    .  LABS:                         15.7   13.5  )-----------( 288      ( 17 Aug 2019 06:36 )             49.6     08-17    132<L>  |  100  |  27<H>  ----------------------------<  83  4.4   |  18<L>  |  1.04    Ca    9.5      17 Aug 2019 06:51  Phos  4.1     08-17  Mg     2.5     08-17    TPro  7.5  /  Alb  3.9  /  TBili  0.3  /  DBili  x   /  AST  188<H>  /  ALT  355<H>  /  AlkPhos  80  08-16    PT/INR - ( 17 Aug 2019 06:35 )   PT: 28.0 sec;   INR: 2.37 ratio         PTT - ( 17 Aug 2019 06:35 )  PTT:39.3 sec          RADIOLOGY, EKG & ADDITIONAL TESTS: Reviewed.

## 2019-08-17 NOTE — PROVIDER CONTACT NOTE (OTHER) - ACTION/TREATMENT ORDERED:
Arya CUEVAS, Ordered Mag and Potassium to drawn with am labs. Will continue to monitor. Arya NP, Ordered Mag and Phos to drawn with am labs. Will continue to monitor.

## 2019-08-18 LAB
ANION GAP SERPL CALC-SCNC: 14 MMOL/L — SIGNIFICANT CHANGE UP (ref 5–17)
BUN SERPL-MCNC: 33 MG/DL — HIGH (ref 7–23)
CALCIUM SERPL-MCNC: 9.4 MG/DL — SIGNIFICANT CHANGE UP (ref 8.4–10.5)
CHLORIDE SERPL-SCNC: 102 MMOL/L — SIGNIFICANT CHANGE UP (ref 96–108)
CO2 SERPL-SCNC: 19 MMOL/L — LOW (ref 22–31)
CREAT SERPL-MCNC: 0.96 MG/DL — SIGNIFICANT CHANGE UP (ref 0.5–1.3)
GLUCOSE SERPL-MCNC: 90 MG/DL — SIGNIFICANT CHANGE UP (ref 70–99)
HCT VFR BLD CALC: 44.3 % — SIGNIFICANT CHANGE UP (ref 39–50)
HGB BLD-MCNC: 14.7 G/DL — SIGNIFICANT CHANGE UP (ref 13–17)
INR BLD: 2.83 RATIO — HIGH (ref 0.88–1.16)
MCHC RBC-ENTMCNC: 30.9 PG — SIGNIFICANT CHANGE UP (ref 27–34)
MCHC RBC-ENTMCNC: 33.3 GM/DL — SIGNIFICANT CHANGE UP (ref 32–36)
MCV RBC AUTO: 92.7 FL — SIGNIFICANT CHANGE UP (ref 80–100)
PLATELET # BLD AUTO: 241 K/UL — SIGNIFICANT CHANGE UP (ref 150–400)
POTASSIUM SERPL-MCNC: 4.7 MMOL/L — SIGNIFICANT CHANGE UP (ref 3.5–5.3)
POTASSIUM SERPL-SCNC: 4.7 MMOL/L — SIGNIFICANT CHANGE UP (ref 3.5–5.3)
PROTHROM AB SERPL-ACNC: 33.6 SEC — HIGH (ref 10–12.9)
RBC # BLD: 4.78 M/UL — SIGNIFICANT CHANGE UP (ref 4.2–5.8)
RBC # FLD: 12.2 % — SIGNIFICANT CHANGE UP (ref 10.3–14.5)
SODIUM SERPL-SCNC: 135 MMOL/L — SIGNIFICANT CHANGE UP (ref 135–145)
WBC # BLD: 13.1 K/UL — HIGH (ref 3.8–10.5)
WBC # FLD AUTO: 13.1 K/UL — HIGH (ref 3.8–10.5)

## 2019-08-18 PROCEDURE — 99232 SBSQ HOSP IP/OBS MODERATE 35: CPT

## 2019-08-18 PROCEDURE — 99233 SBSQ HOSP IP/OBS HIGH 50: CPT

## 2019-08-18 RX ORDER — WARFARIN SODIUM 2.5 MG/1
5 TABLET ORAL ONCE
Refills: 0 | Status: COMPLETED | OUTPATIENT
Start: 2019-08-18 | End: 2019-08-18

## 2019-08-18 RX ORDER — HYDROXYZINE HCL 10 MG
25 TABLET ORAL EVERY 6 HOURS
Refills: 0 | Status: DISCONTINUED | OUTPATIENT
Start: 2019-08-18 | End: 2019-08-20

## 2019-08-18 RX ADMIN — LISINOPRIL 20 MILLIGRAM(S): 2.5 TABLET ORAL at 05:35

## 2019-08-18 RX ADMIN — CLOPIDOGREL BISULFATE 75 MILLIGRAM(S): 75 TABLET, FILM COATED ORAL at 10:00

## 2019-08-18 RX ADMIN — Medication 40 MILLIGRAM(S): at 14:10

## 2019-08-18 RX ADMIN — ATORVASTATIN CALCIUM 80 MILLIGRAM(S): 80 TABLET, FILM COATED ORAL at 21:42

## 2019-08-18 RX ADMIN — Medication 81 MILLIGRAM(S): at 10:00

## 2019-08-18 RX ADMIN — CHLORHEXIDINE GLUCONATE 1 APPLICATION(S): 213 SOLUTION TOPICAL at 21:42

## 2019-08-18 RX ADMIN — WARFARIN SODIUM 5 MILLIGRAM(S): 2.5 TABLET ORAL at 21:42

## 2019-08-18 RX ADMIN — Medication 650 MILLIGRAM(S): at 02:58

## 2019-08-18 RX ADMIN — Medication 75 MILLIGRAM(S): at 05:35

## 2019-08-18 RX ADMIN — Medication 1 APPLICATION(S): at 05:34

## 2019-08-18 RX ADMIN — Medication 25 MILLIGRAM(S): at 14:10

## 2019-08-18 RX ADMIN — Medication 1 APPLICATION(S): at 17:53

## 2019-08-18 NOTE — PROVIDER CONTACT NOTE (OTHER) - ACTION/TREATMENT ORDERED:
FAITH Koenig made aware. No interventions at this time. Will continue to monitor. FAITH Brizuela made aware. No interventions at this time. Will continue to monitor.

## 2019-08-18 NOTE — PHYSICAL THERAPY INITIAL EVALUATION ADULT - PERTINENT HX OF CURRENT PROBLEM, REHAB EVAL
54yoM, Egyptian speaking current smoker, no known PMH 2/2 not seeing a doctor for many years who p/w chest pain w/ elevated troponin to [place] found to have hypertensive emergency & NSTEMI transferred to [facility] for management, now s/p C 8/10/19 showing 100% occlusion to mLAD s/p RADHA x1. Course complicated by NSVT s/p EP study that did not show inducible VT. Course further complicated by LV thrombus

## 2019-08-18 NOTE — PROGRESS NOTE ADULT - SUBJECTIVE AND OBJECTIVE BOX
Patient denies chest pain, shortness of breath or palpitations. He does endorse itchiness in b/l arms, which has not improved    GENERAL: No fevers, no chills.  EYES: No blurry vision,  No photophobia  ENT: No sore throat.  No dysphagia  Cardiovascular: No chest pain, palpitations, orthopnea  Pulmonary: No cough, no wheezing. No shortness of breath  Gastrointestinal: No abdominal pain, no diarrhea, no constipation.    Musculoskeletal:  + itchiness in b/l arms. No weakness.  No myalgias.  Dermatology:  No rashes.  Neuro: No Headache.  No vertigo.  No dizziness.  Psych: No anxiety, no depression.  Denies suicidal thoughts.    MEDICATIONS  (STANDING):  aspirin  chewable 81 milliGRAM(s) Oral daily  atorvastatin 80 milliGRAM(s) Oral at bedtime  chlorhexidine 2% Cloths 1 Application(s) Topical at bedtime  clobetasol 0.05% Ointment 1 Application(s) Topical two times a day  clopidogrel Tablet 75 milliGRAM(s) Oral daily  lisinopril 20 milliGRAM(s) Oral daily  metoprolol succinate ER 75 milliGRAM(s) Oral daily  warfarin 5 milliGRAM(s) Oral once    MEDICATIONS  (PRN):  acetaminophen   Tablet .. 650 milliGRAM(s) Oral every 6 hours PRN Temp greater or equal to 38C (100.4F), Mild Pain (1 - 3), Moderate Pain (4 - 6), Severe Pain (7 - 10)  calamine Lotion 1 Application(s) Topical three times a day PRN Itching    Vital Signs Last 24 Hrs  T(C): 36.9 (18 Aug 2019 12:10), Max: 36.9 (18 Aug 2019 12:10)  T(F): 98.5 (18 Aug 2019 12:10), Max: 98.5 (18 Aug 2019 12:10)  HR: 75 (18 Aug 2019 12:10) (66 - 75)  BP: 116/77 (18 Aug 2019 12:10) (115/79 - 130/87)  BP(mean): --  RR: 18 (18 Aug 2019 12:10) (18 - 18)  SpO2: 94% (18 Aug 2019 12:10) (94% - 99%)    GENERAL: NAD, well-developed  HEAD:  Atraumatic, Normocephalic  EYES: EOMI, PERRLA, conjunctiva and sclera clear  ENT: Pharynx not erythematous  PULMONARY: Clear to auscultation bilaterally; No wheeze  CARDIOVASCULAR: Regular rate and rhythm; No murmurs, rubs, or gallops  ABDOMEN: Soft, Nontender, Nondistended; Bowel sounds present  EXTREMITIES:  2+ Peripheral Pulses, No clubbing, cyanosis, or edema  MUSCULOSKELETAL: No calf tenderness  PSYCH: AAOx3, normal affect  SKIN: erythematous raises patches all over arms b/l    .  LABS:                         14.7   13.1  )-----------( 241      ( 18 Aug 2019 07:09 )             44.3     08-18    135  |  102  |  33<H>  ----------------------------<  90  4.7   |  19<L>  |  0.96    Ca    9.4      18 Aug 2019 07:09  Phos  4.1     08-17  Mg     2.5     08-17      PT/INR - ( 18 Aug 2019 09:55 )   PT: 33.6 sec;   INR: 2.83 ratio         PTT - ( 17 Aug 2019 06:35 )  PTT:39.3 sec          RADIOLOGY, EKG & ADDITIONAL TESTS: Reviewed.

## 2019-08-18 NOTE — PROGRESS NOTE ADULT - ASSESSMENT
Patient is a 54 year old male Barbadian speaking current smoker, no known PMH 2/2 not seeing a doctor for many years who presented to [place] with chest pain with elevated troponin found to have hypertensive emergency and NSTEMI transferred to [facility] for management,  now s/p [facility] 8/10 showing 100% occlusion to mLAD s/p RADHA x1. Course complicated by NSVT s/p EP study that did not show inducible VT. Course further complicated by LV thrombus.

## 2019-08-18 NOTE — PROGRESS NOTE ADULT - PROBLEM SELECTOR PLAN 6
- c/w lipitor 80 mg daily    disposition: dc pending likely tomorrow, pending pt consult     discussed with katrina at [phone]

## 2019-08-18 NOTE — PROGRESS NOTE ADULT - SUBJECTIVE AND OBJECTIVE BOX
For all Cardiology service contact information, go to [email or web address] and use "[email or web address]" to login.      Overnight Events: Itchy skin.  No CP or SOB.  No issues with breakfast.       MEDICATIONS  (STANDING):  aspirin  chewable 81 milliGRAM(s) Oral daily  atorvastatin 80 milliGRAM(s) Oral at bedtime  chlorhexidine 2% Cloths 1 Application(s) Topical at bedtime  clobetasol 0.05% Ointment 1 Application(s) Topical two times a day  clopidogrel Tablet 75 milliGRAM(s) Oral daily  lisinopril 20 milliGRAM(s) Oral daily  metoprolol succinate ER 75 milliGRAM(s) Oral daily  predniSONE   Tablet 40 milliGRAM(s) Oral daily  warfarin 5 milliGRAM(s) Oral once        REVIEW OF SYSTEMS:  CONSTITUTIONAL: No weakness, fevers or chills  EYES/ENT: No visual changes;  No vertigo or throat pain   NECK: No pain or stiffness  RESPIRATORY: No cough, wheezing, hemoptysis; No shortness of breath  CARDIOVASCULAR: +chest pain  GASTROINTESTINAL: No abdominal pain. No nausea, vomiting, or hematemesis; No diarrhea or constipation. No melena or hematochezia.  GENITOURINARY: No dysuria, frequency or hematuria  NEUROLOGICAL: No numbness or weakness  SKIN: +itching/rash  All other review of systems is negative unless indicated above     ICU Vital Signs Last 24 Hrs  T(C): 36.9 (18 Aug 2019 12:10), Max: 36.9 (18 Aug 2019 12:10)  T(F): 98.5 (18 Aug 2019 12:10), Max: 98.5 (18 Aug 2019 12:10)  HR: 75 (18 Aug 2019 12:10) (66 - 75)  BP: 116/77 (18 Aug 2019 12:10) (115/79 - 130/87)  BP(mean): --  ABP: --  ABP(mean): --  RR: 18 (18 Aug 2019 12:10) (18 - 18)  SpO2: 94% (18 Aug 2019 12:10) (94% - 99%)        Physical Exam:  Appearance: No acute distress; well appearing  Eyes: PERRL, EOMI, pink conjunctiva  HENT: Normal oral muscosa  Cardiovascular: RRR, S1, S2, no murmurs, rubs, or gallops; no edema; no JVD  Respiratory: Clear to auscultation bilaterally  Gastrointestinal: soft, non-tender, non-distended with normal bowel sounds  Musculoskeletal: No clubbing; no joint deformity   Neurologic: Non-focal  Lymphatic: No lymphadenopathy  Psychiatry: AAOx3, mood & affect appropriate  Skin: diffuse pruritic rash                                   14.7   13.1  )-----------( 241      ( 18 Aug 2019 07:09 )             44.3    PT/INR - ( 18 Aug 2019 09:55 )   PT: 33.6 sec;   INR: 2.83 ratio         PTT - ( 17 Aug 2019 06:35 )  PTT:39.3 adm27-79    135  |  102  |  33<H>  ----------------------------<  90  4.7   |  19<L>  |  0.96    Ca    9.4      18 Aug 2019 07:09  Phos  4.1     08-17  Mg     2.5     08-17

## 2019-08-18 NOTE — PROGRESS NOTE ADULT - ASSESSMENT
54 year old M pt with no significant PMH who presented as late presentation of NSTEMI found to have 100% pLAD lesion s/p RADHA. Now with HFrEF and LV thrombus.    NSTEMI s/p RADHA to pLAD now with HFrEF (25%), euvolemic.    - continue DAPT  - continue atorvastatin 80    - continue Toprol XL and lisinopril     LV thrombus. INR at goal.  - continue Coumdin   - keep INR 2-3     NSVT. EP study showed no inducible VT.  - cont BB as above    ok for short course of prednisone     Jony [id number]

## 2019-08-18 NOTE — PROGRESS NOTE ADULT - PROBLEM SELECTOR PLAN 4
- c/w clobetasol per dermatology  - derm did recommend steroid taper, however not given per cardiology - c/w clobetasol per dermatology  - okay to start prednisone taper, per cardiology; will start prednisone 40 mg po daily x 5 days, then prednisone 30 mg po daily x 5 days, then prednisone 20 mg po daily x 5 days, then prendisone 10 mg po daily x 5 days and stop  - start prn atarax for itchiness

## 2019-08-18 NOTE — PROVIDER CONTACT NOTE (OTHER) - ASSESSMENT
Patient is A&O x 4. Denies chest pain, SOB, dizziness, is asymptomatic. VS: Temp 98.2, HR 70, /80, RR 18, O2 99% on room air.

## 2019-08-18 NOTE — PHYSICAL THERAPY INITIAL EVALUATION ADULT - ADDITIONAL COMMENTS
Pt lives in a second floor walk up apartment with friend. PTA pt (I) w/ all functional mobility & ADL w/o AD. Pt at baseline (I).

## 2019-08-19 LAB
APTT BLD: 36 SEC — SIGNIFICANT CHANGE UP (ref 27.5–36.3)
INR BLD: 2.78 RATIO — HIGH (ref 0.88–1.16)
PROTHROM AB SERPL-ACNC: 33 SEC — HIGH (ref 10–12.9)

## 2019-08-19 PROCEDURE — 99233 SBSQ HOSP IP/OBS HIGH 50: CPT

## 2019-08-19 RX ORDER — SENNA PLUS 8.6 MG/1
2 TABLET ORAL AT BEDTIME
Refills: 0 | Status: DISCONTINUED | OUTPATIENT
Start: 2019-08-19 | End: 2019-08-20

## 2019-08-19 RX ORDER — WARFARIN SODIUM 2.5 MG/1
5 TABLET ORAL ONCE
Refills: 0 | Status: COMPLETED | OUTPATIENT
Start: 2019-08-19 | End: 2019-08-19

## 2019-08-19 RX ORDER — POLYETHYLENE GLYCOL 3350 17 G/17G
17 POWDER, FOR SOLUTION ORAL DAILY
Refills: 0 | Status: DISCONTINUED | OUTPATIENT
Start: 2019-08-19 | End: 2019-08-20

## 2019-08-19 RX ORDER — DOCUSATE SODIUM 100 MG
100 CAPSULE ORAL
Refills: 0 | Status: DISCONTINUED | OUTPATIENT
Start: 2019-08-19 | End: 2019-08-20

## 2019-08-19 RX ADMIN — CHLORHEXIDINE GLUCONATE 1 APPLICATION(S): 213 SOLUTION TOPICAL at 22:11

## 2019-08-19 RX ADMIN — Medication 81 MILLIGRAM(S): at 09:21

## 2019-08-19 RX ADMIN — Medication 40 MILLIGRAM(S): at 06:05

## 2019-08-19 RX ADMIN — CLOPIDOGREL BISULFATE 75 MILLIGRAM(S): 75 TABLET, FILM COATED ORAL at 09:21

## 2019-08-19 RX ADMIN — ATORVASTATIN CALCIUM 80 MILLIGRAM(S): 80 TABLET, FILM COATED ORAL at 22:13

## 2019-08-19 RX ADMIN — CALAMINE AND ZINC OXIDE AND PHENOL 1 APPLICATION(S): 160; 10 LOTION TOPICAL at 09:21

## 2019-08-19 RX ADMIN — Medication 1 APPLICATION(S): at 17:45

## 2019-08-19 RX ADMIN — Medication 1 APPLICATION(S): at 06:04

## 2019-08-19 RX ADMIN — POLYETHYLENE GLYCOL 3350 17 GRAM(S): 17 POWDER, FOR SOLUTION ORAL at 10:38

## 2019-08-19 RX ADMIN — LISINOPRIL 20 MILLIGRAM(S): 2.5 TABLET ORAL at 06:04

## 2019-08-19 RX ADMIN — WARFARIN SODIUM 5 MILLIGRAM(S): 2.5 TABLET ORAL at 22:13

## 2019-08-19 RX ADMIN — SENNA PLUS 2 TABLET(S): 8.6 TABLET ORAL at 22:14

## 2019-08-19 RX ADMIN — Medication 100 MILLIGRAM(S): at 10:37

## 2019-08-19 RX ADMIN — Medication 75 MILLIGRAM(S): at 06:05

## 2019-08-19 NOTE — PROGRESS NOTE ADULT - PROBLEM SELECTOR PLAN 4
- c/w clobetasol per dermatology  - okay to start prednisone taper, per cardiology; will start prednisone 40 mg po daily x 5 days, then prednisone 30 mg po daily x 5 days, then prednisone 20 mg po daily x 5 days, then prendisone 10 mg po daily x 5 days and stop  - prn atarax for itchiness

## 2019-08-19 NOTE — PROGRESS NOTE ADULT - PROBLEM SELECTOR PLAN 6
- c/w lipitor 80 mg daily    disposition: pending pt consult - planning for dc in AM. discussed with pt. will ask for pharmacy intervention to provide education on patients meds.    discussed with katrina at [phone]

## 2019-08-19 NOTE — PROGRESS NOTE ADULT - SUBJECTIVE AND OBJECTIVE BOX
Patient is a 54y old  Male who presents with a chief complaint of NSTEMI (18 Aug 2019 14:37)      SUBJECTIVE / OVERNIGHT EVENTS:    feeling well today- no cp, sob.   has not had a bowel movement in several days    MEDICATIONS  (STANDING):  aspirin  chewable 81 milliGRAM(s) Oral daily  atorvastatin 80 milliGRAM(s) Oral at bedtime  chlorhexidine 2% Cloths 1 Application(s) Topical at bedtime  clobetasol 0.05% Ointment 1 Application(s) Topical two times a day  clopidogrel Tablet 75 milliGRAM(s) Oral daily  docusate sodium 100 milliGRAM(s) Oral two times a day  lisinopril 20 milliGRAM(s) Oral daily  metoprolol succinate ER 75 milliGRAM(s) Oral daily  polyethylene glycol 3350 17 Gram(s) Oral daily  predniSONE   Tablet 40 milliGRAM(s) Oral daily  senna 2 Tablet(s) Oral at bedtime  warfarin 5 milliGRAM(s) Oral once    MEDICATIONS  (PRN):  acetaminophen   Tablet .. 650 milliGRAM(s) Oral every 6 hours PRN Temp greater or equal to 38C (100.4F), Mild Pain (1 - 3), Moderate Pain (4 - 6), Severe Pain (7 - 10)  calamine Lotion 1 Application(s) Topical three times a day PRN Itching  hydrOXYzine hydrochloride 25 milliGRAM(s) Oral every 6 hours PRN Itching        CAPILLARY BLOOD GLUCOSE        I&O's Summary    18 Aug 2019 07:01  -  19 Aug 2019 07:00  --------------------------------------------------------  IN: 860 mL / OUT: 0 mL / NET: 860 mL    19 Aug 2019 07:01  -  19 Aug 2019 15:56  --------------------------------------------------------  IN: 480 mL / OUT: 0 mL / NET: 480 mL      TELE: si nus rhythm 50-90  PHYSICAL EXAM:  GENERAL: NAD, well-developed  HEAD:  Atraumatic, Normocephalic  EYES:  conjunctiva and sclera clear  NECK: Supple, No JVD  CHEST/LUNG: Clear to auscultation bilaterally; No wheeze  HEART: Regular rate and rhythm; No murmurs, rubs, or gallops  ABDOMEN: Soft, Nontender, Nondistended; Bowel sounds present  EXTREMITIES:  2+ Peripheral Pulses, No clubbing, cyanosis, or edema  PSYCH: AAOx3  NEUROLOGY: non-focal  SKIN: No rashes or lesions    LABS:                        14.7   13.1  )-----------( 241      ( 18 Aug 2019 07:09 )             44.3     08-18    135  |  102  |  33<H>  ----------------------------<  90  4.7   |  19<L>  |  0.96    Ca    9.4      18 Aug 2019 07:09      PT/INR - ( 19 Aug 2019 06:26 )   PT: 33.0 sec;   INR: 2.78 ratio         PTT - ( 19 Aug 2019 06:26 )  PTT:36.0 sec          RADIOLOGY & ADDITIONAL TESTS:    Consultant(s) Notes Reviewed:  cards    Care Discussed with Consultants/Other Providers: medicine np

## 2019-08-19 NOTE — PROGRESS NOTE ADULT - ASSESSMENT
Patient is a 54 year old male Greek speaking current smoker, no known PMH 2/2 not seeing a doctor for many years who presented to [place] with chest pain with elevated troponin found to have hypertensive emergency and NSTEMI transferred to [facility] for management,  now s/p [facility] 8/10 showing 100% occlusion to mLAD s/p RADHA x1. Course complicated by NSVT s/p EP study that did not show inducible VT. Course further complicated by LV thrombus.

## 2019-08-19 NOTE — CHART NOTE - NSCHARTNOTEFT_GEN_A_CORE
Nutrition Follow Up Note  Patient seen for: follow up    · Reason for Admission	NSTEMI    Patient is a 54 year old male Qatari speaking current smoker, no known PMH 2/2 not seeing a doctor for many years who presented to [place] with chest pain with elevated troponin found to have hypertensive emergency and NSTEMI transferred to [facility] for management,  now s/p [facility] 8/10 showing 100% occlusion to mLAD s/p RADHA x1. Course complicated by NSVT s/p EP study that did not show inducible VT. Course further complicated by LV thrombus.      disposition: dc pending likely today, pending pt consult       Source: pt, chart, pt speaks Qatari, used  phone    Diet : DASH, NO PORK    Patient reports: eating well, no need for supplements     PO intake : 50-60%     Source for PO intake: pt          Daily Weight in k.4 (-), Weight in k.2 (-18), Weight in k.3 (-16), Weight in k.4 (-), Weight in k.4 (-)  % Weight Change:3% gain since     Pertinent Medications: MEDICATIONS  (STANDING):  aspirin  chewable 81 milliGRAM(s) Oral daily  atorvastatin 80 milliGRAM(s) Oral at bedtime  chlorhexidine 2% Cloths 1 Application(s) Topical at bedtime  clobetasol 0.05% Ointment 1 Application(s) Topical two times a day  clopidogrel Tablet 75 milliGRAM(s) Oral daily  lisinopril 20 milliGRAM(s) Oral daily  metoprolol succinate ER 75 milliGRAM(s) Oral daily  predniSONE   Tablet 40 milliGRAM(s) Oral daily    MEDICATIONS  (PRN):  acetaminophen   Tablet .. 650 milliGRAM(s) Oral every 6 hours PRN Temp greater or equal to 38C (100.4F), Mild Pain (1 - 3), Moderate Pain (4 - 6), Severe Pain (7 - 10)  calamine Lotion 1 Application(s) Topical three times a day PRN Itching  hydrOXYzine hydrochloride 25 milliGRAM(s) Oral every 6 hours PRN Itching    Pertinent Labs: BUN 33          Hemoglobin A1C, Whole Blood: 5.6 % (08-10 @ 10:21)            Skin per nursing documentation: no pressure injuries  Edema: none    Estimated Needs:   [x ] no change since previous assessment  [ ] recalculated:     Previous Nutrition Diagnosis:  Food & Nutrition Related Knowledge Deficit.   Nutrition Diagnosis is: ongoing-reinforced diet ed for both DASH and coumadin        Recommend  continue to provided DASH and coumadin ed as needed  continue DASH, NO PORK diet      Monitoring and Evaluation:     Continue to monitor Nutritional intake, Tolerance to diet prescription, weights, labs, skin integrity    RD remains available upon request and will follow up per protocol  Krys Zacarias MA, RD, CDN #[phone]

## 2019-08-19 NOTE — PROGRESS NOTE ADULT - PROBLEM SELECTOR PROBLEM 5
Essential hypertension The resident's documentation has been prepared under my direction and personally reviewed by me in its entirety. I confirm that the note above accurately reflects all work, treatment, procedures, and medical decision making performed by me.  Rodrigo Stewart MD

## 2019-08-20 ENCOUNTER — TRANSCRIPTION ENCOUNTER (OUTPATIENT)
Age: 54
End: 2019-08-20

## 2019-08-20 VITALS
TEMPERATURE: 98 F | SYSTOLIC BLOOD PRESSURE: 104 MMHG | RESPIRATION RATE: 18 BRPM | HEART RATE: 71 BPM | OXYGEN SATURATION: 100 % | DIASTOLIC BLOOD PRESSURE: 62 MMHG

## 2019-08-20 DIAGNOSIS — I50.22 CHRONIC SYSTOLIC (CONGESTIVE) HEART FAILURE: ICD-10-CM

## 2019-08-20 LAB
APTT BLD: 34.8 SEC — SIGNIFICANT CHANGE UP (ref 27.5–36.3)
HCT VFR BLD CALC: 42.4 % — SIGNIFICANT CHANGE UP (ref 39–50)
HGB BLD-MCNC: 13.6 G/DL — SIGNIFICANT CHANGE UP (ref 13–17)
INR BLD: 2.47 RATIO — HIGH (ref 0.88–1.16)
MCHC RBC-ENTMCNC: 29.6 PG — SIGNIFICANT CHANGE UP (ref 27–34)
MCHC RBC-ENTMCNC: 32.2 GM/DL — SIGNIFICANT CHANGE UP (ref 32–36)
MCV RBC AUTO: 92.1 FL — SIGNIFICANT CHANGE UP (ref 80–100)
PLATELET # BLD AUTO: 270 K/UL — SIGNIFICANT CHANGE UP (ref 150–400)
PROTHROM AB SERPL-ACNC: 29 SEC — HIGH (ref 10–12.9)
RBC # BLD: 4.6 M/UL — SIGNIFICANT CHANGE UP (ref 4.2–5.8)
RBC # FLD: 12 % — SIGNIFICANT CHANGE UP (ref 10.3–14.5)
WBC # BLD: 13.9 K/UL — HIGH (ref 3.8–10.5)
WBC # FLD AUTO: 13.9 K/UL — HIGH (ref 3.8–10.5)

## 2019-08-20 PROCEDURE — 93458 L HRT ARTERY/VENTRICLE ANGIO: CPT | Mod: 59

## 2019-08-20 PROCEDURE — C1769: CPT

## 2019-08-20 PROCEDURE — 84443 ASSAY THYROID STIM HORMONE: CPT

## 2019-08-20 PROCEDURE — C9606: CPT | Mod: LD

## 2019-08-20 PROCEDURE — 93621 COMP EP EVL L PAC&REC C SINS: CPT

## 2019-08-20 PROCEDURE — C1887: CPT

## 2019-08-20 PROCEDURE — 93620 COMP EP EVL R AT VEN PAC&REC: CPT

## 2019-08-20 PROCEDURE — 86900 BLOOD TYPING SEROLOGIC ABO: CPT

## 2019-08-20 PROCEDURE — 82553 CREATINE MB FRACTION: CPT

## 2019-08-20 PROCEDURE — 83036 HEMOGLOBIN GLYCOSYLATED A1C: CPT

## 2019-08-20 PROCEDURE — 86901 BLOOD TYPING SEROLOGIC RH(D): CPT

## 2019-08-20 PROCEDURE — 84100 ASSAY OF PHOSPHORUS: CPT

## 2019-08-20 PROCEDURE — 97161 PT EVAL LOW COMPLEX 20 MIN: CPT

## 2019-08-20 PROCEDURE — 85027 COMPLETE CBC AUTOMATED: CPT

## 2019-08-20 PROCEDURE — 82550 ASSAY OF CK (CPK): CPT

## 2019-08-20 PROCEDURE — C1725: CPT

## 2019-08-20 PROCEDURE — 85730 THROMBOPLASTIN TIME PARTIAL: CPT

## 2019-08-20 PROCEDURE — 99152 MOD SED SAME PHYS/QHP 5/>YRS: CPT

## 2019-08-20 PROCEDURE — 84484 ASSAY OF TROPONIN QUANT: CPT

## 2019-08-20 PROCEDURE — 93623 PRGRMD STIMJ&PACG IV RX NFS: CPT

## 2019-08-20 PROCEDURE — 80048 BASIC METABOLIC PNL TOTAL CA: CPT

## 2019-08-20 PROCEDURE — 93005 ELECTROCARDIOGRAM TRACING: CPT

## 2019-08-20 PROCEDURE — 80053 COMPREHEN METABOLIC PANEL: CPT

## 2019-08-20 PROCEDURE — 93306 TTE W/DOPPLER COMPLETE: CPT

## 2019-08-20 PROCEDURE — C1874: CPT

## 2019-08-20 PROCEDURE — 99239 HOSP IP/OBS DSCHRG MGMT >30: CPT

## 2019-08-20 PROCEDURE — 83735 ASSAY OF MAGNESIUM: CPT

## 2019-08-20 PROCEDURE — 99291 CRITICAL CARE FIRST HOUR: CPT | Mod: 25

## 2019-08-20 PROCEDURE — 85610 PROTHROMBIN TIME: CPT

## 2019-08-20 PROCEDURE — C1894: CPT

## 2019-08-20 PROCEDURE — 99153 MOD SED SAME PHYS/QHP EA: CPT

## 2019-08-20 PROCEDURE — 80061 LIPID PANEL: CPT

## 2019-08-20 PROCEDURE — 86850 RBC ANTIBODY SCREEN: CPT

## 2019-08-20 PROCEDURE — 96375 TX/PRO/DX INJ NEW DRUG ADDON: CPT

## 2019-08-20 PROCEDURE — 96374 THER/PROPH/DIAG INJ IV PUSH: CPT

## 2019-08-20 PROCEDURE — C1730: CPT

## 2019-08-20 RX ORDER — AZTREONAM 2 G
1 VIAL (EA) INJECTION
Qty: 0 | Refills: 0 | DISCHARGE

## 2019-08-20 RX ORDER — POLYETHYLENE GLYCOL 3350 17 G/17G
17 POWDER, FOR SOLUTION ORAL
Qty: 1 | Refills: 0
Start: 2019-08-20 | End: 2019-08-26

## 2019-08-20 RX ORDER — CALAMINE AND ZINC OXIDE AND PHENOL 160; 10 MG/ML; MG/ML
1 LOTION TOPICAL
Qty: 1 | Refills: 0
Start: 2019-08-20 | End: 2019-08-26

## 2019-08-20 RX ORDER — WARFARIN SODIUM 2.5 MG/1
5 TABLET ORAL ONCE
Refills: 0 | Status: COMPLETED | OUTPATIENT
Start: 2019-08-20 | End: 2019-08-20

## 2019-08-20 RX ORDER — METOPROLOL TARTRATE 50 MG
50 TABLET ORAL DAILY
Refills: 0 | Status: DISCONTINUED | OUTPATIENT
Start: 2019-08-20 | End: 2019-08-20

## 2019-08-20 RX ORDER — DOCUSATE SODIUM 100 MG
1 CAPSULE ORAL
Qty: 60 | Refills: 0
Start: 2019-08-20 | End: 2019-09-18

## 2019-08-20 RX ORDER — LACTULOSE 10 G/15ML
20 SOLUTION ORAL ONCE
Refills: 0 | Status: COMPLETED | OUTPATIENT
Start: 2019-08-20 | End: 2019-08-20

## 2019-08-20 RX ORDER — HYDROXYZINE HCL 10 MG
0 TABLET ORAL
Qty: 0 | Refills: 0 | DISCHARGE

## 2019-08-20 RX ORDER — ACETAMINOPHEN 500 MG
2 TABLET ORAL
Qty: 0 | Refills: 0 | DISCHARGE
Start: 2019-08-20

## 2019-08-20 RX ORDER — SENNA PLUS 8.6 MG/1
2 TABLET ORAL
Qty: 60 | Refills: 0
Start: 2019-08-20 | End: 2019-09-18

## 2019-08-20 RX ADMIN — Medication 50 MILLIGRAM(S): at 12:01

## 2019-08-20 RX ADMIN — CLOPIDOGREL BISULFATE 75 MILLIGRAM(S): 75 TABLET, FILM COATED ORAL at 10:40

## 2019-08-20 RX ADMIN — LISINOPRIL 20 MILLIGRAM(S): 2.5 TABLET ORAL at 05:51

## 2019-08-20 RX ADMIN — POLYETHYLENE GLYCOL 3350 17 GRAM(S): 17 POWDER, FOR SOLUTION ORAL at 10:40

## 2019-08-20 RX ADMIN — Medication 100 MILLIGRAM(S): at 05:51

## 2019-08-20 RX ADMIN — Medication 650 MILLIGRAM(S): at 02:11

## 2019-08-20 RX ADMIN — Medication 1 APPLICATION(S): at 18:03

## 2019-08-20 RX ADMIN — Medication 1 APPLICATION(S): at 05:52

## 2019-08-20 RX ADMIN — Medication 25 MILLIGRAM(S): at 01:16

## 2019-08-20 RX ADMIN — Medication 25 MILLIGRAM(S): at 12:01

## 2019-08-20 RX ADMIN — Medication 40 MILLIGRAM(S): at 05:52

## 2019-08-20 RX ADMIN — Medication 81 MILLIGRAM(S): at 10:40

## 2019-08-20 RX ADMIN — WARFARIN SODIUM 5 MILLIGRAM(S): 2.5 TABLET ORAL at 21:05

## 2019-08-20 RX ADMIN — ATORVASTATIN CALCIUM 80 MILLIGRAM(S): 80 TABLET, FILM COATED ORAL at 21:04

## 2019-08-20 RX ADMIN — Medication 100 MILLIGRAM(S): at 18:03

## 2019-08-20 RX ADMIN — LACTULOSE 20 GRAM(S): 10 SOLUTION ORAL at 13:29

## 2019-08-20 RX ADMIN — Medication 650 MILLIGRAM(S): at 00:39

## 2019-08-20 NOTE — DISCHARGE NOTE PROVIDER - NSDCFUADDAPPT_GEN_ALL_CORE_FT
YOU HAVE AN APPOINTMENT ON THURSDAY AUGUST 22ND at 1:30pm FOR THE GENERAL MEDICINE CLINIC. This will be your hospital follow up. Please bring any identification you have and discharge paperwork to appointment.   Address: [address] [place] [address]. [place] [address].  Phone: [phone]

## 2019-08-20 NOTE — PROGRESS NOTE ADULT - ATTENDING COMMENTS
Patient is seen and examined with fellow, NP and the CCU house-staff. I agree with the history, physical and the assessment and plan.  STEMI s/p PCI with RADHA to LAD with LV thrombus  - educated on the importance of DAPT   - Ace-inhibitor initiated  - Beta-blocker initiated  - patient is on Lipitor 80 mg daily  - hep to coumadin for the LV thrombus  - prednisone taper for the dermatitis  - patient is undocumented and cannot afford the LifeVest
Patient is seen and examined with fellow, NP and the CCU house-staff. I agree with the history, physical and the assessment and plan.  concerned for the fast arrhythmia that took place > 48 hrs post the revascularization - would consider an EPS   presently on higher dose of BB  on DAPT
Patient is seen and examined with fellow, NP and the CCU house-staff. I agree with the history, physical and the assessment and plan.  plan for possible EP study to understand if he is inducible for VT: if so, will implant an ICD for the severe ICM  on DAPT for the STEMI  on hep to coumadin for the LV thrombus  dizzy with higher doses of BB
Patient is seen and examined with fellow, NP and the CCU house-staff. I agree with the history, physical and the assessment and plan.  nstemi s/p pciu to LAD  with LV thrmonbus - will start hep gtt and dose coumadin  on BB and ace for afterload
Given that he demonstrated rapid non sustained VT in the context of extensive LV damage several days after his MI, I agree it makes sense to do an EP study looking for monomorphic VT that might warrant an early ICD implant. If he is not inducible for a sustained MMVT, I would be inclined to continue beta blockers and ACE-I and reassess LVEF in 3 months.
Continue current meds  Observe through weekend  derm eval for rash    Jony [id number]
Patient with multiple cardiovascular risk factors presents with NSTEMI, now status post PCI.  Dermatology consult requested for severe allergic rash - patient reports he believes it is poison ivy.  Continue dual antiplatelet therapy, high intensity statin. Monitor LFTs.  Beta-blocker and ACEi/ARB as BP and HR permit.
disposition: home today. all meds sent to [facility]. pharmacy and social work saw patient. I made patient an appointment on Thursday August 22nd 1:30 pm at [address] [place] [address] medicine clinic for follow up.   dc time 50 mins.   discussed with medicine NP Courtney Falcon MD  pager [phone]
Patient is seen and examined with fellow, NP and the CCU house-staff. I agree with the history, physical and the assessment and plan.  plan for EP study today  c/w DAPT for the STEMI  hep gtt to coumadin for the LV thrombus
MONAE Falcon MD  pager [phone]

## 2019-08-20 NOTE — PROGRESS NOTE ADULT - PROBLEM SELECTOR PLAN 1
- continue DAPT  - continue atorvastatin 80    - continue Toprol XL and lisinopril  -pharmacy intervention/medication education provided today  -discussed with social work - all meds to be sent to [facility]

## 2019-08-20 NOTE — DISCHARGE NOTE NURSING/CASE MANAGEMENT/SOCIAL WORK - NSDCFUADDAPPT_GEN_ALL_CORE_FT
YOU HAVE AN APPOINTMENT ON THURSDAY AUGUST 22ND at 1:30pm FOR THE GENERAL MEDICINE CLINIC. This will be your hospital follow up. Please bring any identification you have and discharge paperwork to appointment.   Address: [address] [place] [address]. [place] [address].  Phone: [phone]

## 2019-08-20 NOTE — DISCHARGE NOTE NURSING/CASE MANAGEMENT/SOCIAL WORK - NSDCDPATPORTLINK_GEN_ALL_CORE
You can access the [facility] Portal, offered by [facility], by registering with the following website: [email or web address]

## 2019-08-20 NOTE — DISCHARGE NOTE PROVIDER - NSDCCPCAREPLAN_GEN_ALL_CORE_FT
PRINCIPAL DISCHARGE DIAGNOSIS  Diagnosis: NSTEMI (non-ST elevated myocardial infarction)  Assessment and Plan of Treatment: Low salt, low fat diet.   Weight management.   Take medications as prescribed.    No smoking.  Follow up appointments with your doctor(s)  as instruced.        SECONDARY DISCHARGE DIAGNOSES  Diagnosis: Poison ivy dermatitis  Assessment and Plan of Treatment: discharge on skin cream and treatment   please follow up if with fever and worsing to hand,    Diagnosis: NSVT (nonsustained ventricular tachycardia)  Assessment and Plan of Treatment: Continue with current medication    Diagnosis: Left ventricular thrombus with acute MI  Assessment and Plan of Treatment: Discharge on Coumadin ***inform patient to follow up with appoinmment on thursday at 1:30pm for Inr Check    Diagnosis: Essential hypertension  Assessment and Plan of Treatment: Low salt diet  Activity as tolerated.  Take all medication as prescribed.  Follow up with your medical doctor for routine blood pressure monitoring at your next visit.  Notify your doctor if you have any of the following symptoms:   Dizziness, Lightheadedness, Blurry vision, Headache, Chest pain, Shortness of breath PRINCIPAL DISCHARGE DIAGNOSIS  Diagnosis: NSTEMI (non-ST elevated myocardial infarction)  Assessment and Plan of Treatment: Low salt, low fat diet.   Weight management.   Take medications as prescribed.    No smoking.  Follow up appointments with your doctor(s)  as instruced.        SECONDARY DISCHARGE DIAGNOSES  Diagnosis: Chronic HFrEF (heart failure with reduced ejection fraction)  Assessment and Plan of Treatment: following heart attack. continue your meds- low salt intake and watch your fluid intake. Follow up with your doctors as indicated.    Diagnosis: Essential hypertension  Assessment and Plan of Treatment: Low salt diet  Activity as tolerated.  Take all medication as prescribed.  Follow up with your medical doctor for routine blood pressure monitoring at your next visit.  Notify your doctor if you have any of the following symptoms:   Dizziness, Lightheadedness, Blurry vision, Headache, Chest pain, Shortness of breath      Diagnosis: Left ventricular thrombus with acute MI  Assessment and Plan of Treatment: Discharge on Coumadin ***inform patient to follow up with appoinmment on thursday at 1:30pm for Inr Check    Diagnosis: NSVT (nonsustained ventricular tachycardia)  Assessment and Plan of Treatment: Continue with current medication    Diagnosis: Left ventricular thrombus with acute MI  Assessment and Plan of Treatment: Continue to take Coumadin for stroke prevention and treatment of thrombus found in your heart chamber following heart attack.    Diagnosis: Poison ivy dermatitis  Assessment and Plan of Treatment: discharge on skin cream and treatment   please follow up if with fever and worsing to hand,

## 2019-08-20 NOTE — PROGRESS NOTE ADULT - PROBLEM SELECTOR PLAN 2
- LV thrombus. INR at goal.  - continue Coumadin 5mg daily  - keep INR 2-3  -appointment made at general internal medicine clinic on thursday august 22nd for follow up

## 2019-08-20 NOTE — DISCHARGE NOTE PROVIDER - NSDCFUSCHEDAPPT_GEN_ALL_CORE_FT
CAMILLA STILL ; 08/22/2019 ; NPP Med Int [address] [facility]  CAMILLA STILL ; 08/30/2019 ; NPP Cardio Electro [address]  CAMILLA STILL ; 08/22/2019 ; NPP Med Int [address] [facility]  CAMILLA STILL ; 08/30/2019 ; NPP Cardio Electro [address]  CAMILLA STILL ; 08/22/2019 ; NPP Med Int [address] [facility]  CAMILLA STILL ; 08/30/2019 ; NPP Cardio Electro [address]  CAMILLA STILL ; 08/22/2019 ; NPP Med Int [address] [facility]  CAMILLA STILL ; 08/30/2019 ; NPP Cardio Electro [address]  CAMILLA STILL ; 08/22/2019 ; NPP Med Int [address] [facility]  CAMILLA STILL ; 08/30/2019 ; NPP Cardio Electro [address]

## 2019-08-20 NOTE — PROGRESS NOTE ADULT - PROBLEM SELECTOR PLAN 5
- bp controlled  - c/w lisinopril and beta blocker - c/w clobetasol per dermatology  - okay to start prednisone taper, per cardiology; will start prednisone 40 mg po daily x 5 days, then prednisone 20 mg po daily x 5 days, then prendisone 10 mg po daily x 5 days and stop  - prn atarax for itchiness  -prn calamine lotion

## 2019-08-20 NOTE — PROGRESS NOTE ADULT - SUBJECTIVE AND OBJECTIVE BOX
Patient is a 54y old  Male who presents with a chief complaint of NSTEMI (20 Aug 2019 12:39)      SUBJECTIVE / OVERNIGHT EVENTS:    no overnight events. still feeling aggravated by his arms bilateral- reports continued itching.   denies cp, sob.    MEDICATIONS  (STANDING):  aspirin  chewable 81 milliGRAM(s) Oral daily  atorvastatin 80 milliGRAM(s) Oral at bedtime  chlorhexidine 2% Cloths 1 Application(s) Topical at bedtime  clobetasol 0.05% Ointment 1 Application(s) Topical two times a day  clopidogrel Tablet 75 milliGRAM(s) Oral daily  docusate sodium 100 milliGRAM(s) Oral two times a day  lactulose Syrup 20 Gram(s) Oral once  lisinopril 20 milliGRAM(s) Oral daily  metoprolol succinate ER 50 milliGRAM(s) Oral daily  polyethylene glycol 3350 17 Gram(s) Oral daily  predniSONE   Tablet 40 milliGRAM(s) Oral daily  senna 2 Tablet(s) Oral at bedtime    MEDICATIONS  (PRN):  acetaminophen   Tablet .. 650 milliGRAM(s) Oral every 6 hours PRN Temp greater or equal to 38C (100.4F), Mild Pain (1 - 3), Moderate Pain (4 - 6), Severe Pain (7 - 10)  calamine Lotion 1 Application(s) Topical three times a day PRN Itching  hydrOXYzine hydrochloride 25 milliGRAM(s) Oral every 6 hours PRN Itching        CAPILLARY BLOOD GLUCOSE        I&O's Summary    19 Aug 2019 07:01  -  20 Aug 2019 07:00  --------------------------------------------------------  IN: 480 mL / OUT: 0 mL / NET: 480 mL    20 Aug 2019 07:01  -  20 Aug 2019 13:21  --------------------------------------------------------  IN: 240 mL / OUT: 0 mL / NET: 240 mL      VSS  PHYSICAL EXAM:  GENERAL: NAD, well-developed  HEAD:  Atraumatic, Normocephalic  EYES:  conjunctiva and sclera clear  NECK: Supple, No JVD  CHEST/LUNG: Clear to auscultation bilaterally; No wheeze  HEART: Regular rate and rhythm; No murmurs, rubs, or gallops  ABDOMEN: Soft, Nontender, Nondistended; Bowel sounds present  EXTREMITIES:  2+ Peripheral Pulses, No clubbing, cyanosis, or edema  PSYCH: AAOx3  NEUROLOGY: non-focal  SKIN: bilateral forearm poison ivy-erythematous some scabbing and excoriations    LABS:                        13.6   13.9  )-----------( 270      ( 20 Aug 2019 06:27 )             42.4           PT/INR - ( 20 Aug 2019 06:27 )   PT: 29.0 sec;   INR: 2.47 ratio         PTT - ( 20 Aug 2019 06:27 )  PTT:34.8 sec          RADIOLOGY & ADDITIONAL TESTS:    Imaging Personally Reviewed:    Consultant(s) Notes Reviewed:  derm, cards    Care Discussed with Consultants/Other Providers: medicine np, pharmacy

## 2019-08-20 NOTE — PROGRESS NOTE ADULT - ASSESSMENT
Patient is a 54 year old male Togolese speaking current smoker, no known PMH 2/2 not seeing a doctor for many years who presented to [facility] with chest pain with elevated troponin found to have hypertensive emergency and NSTEMI transferred to [facility] for management,  now s/p [facility] 8/10 showing 100% occlusion to mLAD s/p RADHA x1. Course complicated by NSVT s/p EP study that did not show inducible VT. Course further complicated by LV thrombus.

## 2019-08-20 NOTE — PROGRESS NOTE ADULT - PROVIDER SPECIALTY LIST ADULT
CCU
Cardiology
Cardiology
Electrophysiology
Hospitalist
Internal Medicine
Internal Medicine
CCU
CCU
Electrophysiology
Hospitalist

## 2019-08-20 NOTE — PROGRESS NOTE ADULT - PROBLEM SELECTOR PLAN 4
- c/w clobetasol per dermatology  - okay to start prednisone taper, per cardiology; will start prednisone 40 mg po daily x 5 days, then prednisone 20 mg po daily x 5 days, then prendisone 10 mg po daily x 5 days and stop  - prn atarax for itchiness  -prn calamine lotion seen on TTE- EF 25%. Euvolemic. Post MI  -cont b blocker  -cont ACE  -will need general cardiology follow up

## 2019-08-20 NOTE — CHART NOTE - NSCHARTNOTEFT_GEN_A_CORE
Patient is a 54y old  Male who presents with a chief complaint of NSTEMI (20 Aug 2019 13:20)      Vital Signs Last 24 Hrs  T(C): 37.4 (20 Aug 2019 11:55), Max: 37.4 (20 Aug 2019 11:55)  T(F): 99.4 (20 Aug 2019 11:55), Max: 99.4 (20 Aug 2019 11:55)  HR: 67 (20 Aug 2019 11:55) (57 - 67)  BP: 131/81 (20 Aug 2019 11:55) (119/75 - 138/87)  BP(mean): --  RR: 18 (20 Aug 2019 11:55) (18 - 18)  SpO2: 99% (20 Aug 2019 11:55) (96% - 100%)      Labs:                          13.6   13.9  )-----------( 270      ( 20 Aug 2019 06:27 )             42.4       Radiology:    Physical Exam:  General: WN/WD NAD  Neurology: A&Ox3, nonfocal, MUÑOZ x 4  Head:  Normocephalic, atraumatic  Respiratory: CTA B/L  CV: RRR, S1S2, no murmur  Abdominal: Soft, NT, ND no palpable mass  MSK: No edema, + peripheral pulses, FROM all 4 extremity    Discharge Note and Plan: patient medically stable for discharge   Patient undocumented need medications to send to [facility]   Medications all send to [facility] pharmacy   Pharmacy Reviewed all patient's medications with him   social work Fredis assess cost for medications and patients friend will paid for medications   patient informed all his medications are at [facility] and need to  with cost total $116 for all medications. patient agreed will  medications.   Patient verbalize understanding.   RN aware patient discharge instruction    >Follow up with Cardiology outpatient and need to have INR check on  Thursday at 1:30  outpatient with cardiology   >  >  >

## 2019-08-20 NOTE — DISCHARGE NOTE PROVIDER - HOSPITAL COURSE
Patient is a 54 year old male Angolan speaking current smoker, no known PMH 2/2 not seeing a doctor for many years who presented to [facility] with chest pain with elevated troponin found to have hypertensive emergency and NSTEMI transferred to [facility] for management,  had left heart cath  8/10 showing 100% occlusion to mLAD s/p RADHA x1. Course complicated by NSVT s/p EP study that did not show inducible VT. Course further complicated by LV thrombus on coumadin      Admitted  NSTEMI (non-ST elevated myocardial infarction). discharge on current medication        Left ventricular thrombus with acute MI.  Plan: - LV thrombus. INR at goal. discharge     : NSVT (nonsustained ventricular tachycardia).  Plan: - EP study showed no inducible VT    - c/w beta blocker.      Poison ivy dermatitis.  continue with  clobetasol per dermatology     discharge on  prednisone taper,  40 mg po daily x 5days, then prednisone  days, then prednisone 20 mg po daily x 5 days, then prendisone 10 mg po daily x 5 days and stop    - prn atarax for itchiness.    Patient to follow up with Cardiology outpatient as schedule appoinement Patient is a 54 year old male Egyptian speaking current smoker, no known PMH 2/2 not seeing a doctor for many years who presented to [facility] with chest pain with elevated troponin found to have hypertensive emergency and NSTEMI transferred to [facility] for management,  had left heart cath  8/10 showing 100% occlusion to mLAD s/p RADHA x1 now with HFrEF (25%), euvolemic.  Course complicated by NSVT s/p EP study that did not show inducible VT. Course further complicated by LV thrombus on coumadin      Admitted  NSTEMI (non-ST elevated myocardial infarction). discharge on current medication        Left ventricular thrombus with acute MI.  Plan: - LV thrombus. INR at goal. discharge     : NSVT (nonsustained ventricular tachycardia).  Plan: - EP study showed no inducible VT    - c/w beta blocker.      Poison ivy dermatitis.  continue with  clobetasol per dermatology     discharge on  prednisone taper,  40 mg po daily x 5days, then prednisone  days, then prednisone 20 mg po daily x 5 days, then prendisone 10 mg po daily x 5 days and stop    - prn atarax for itchiness.    Patient to follow up with Cardiology outpatient as schedule appoinement     54 year old M pt with no significant PMH who presented as late presentation of NSTEMI found to have 100% pLAD lesion s/p RADHA. Now with HFrEF and LV thrombus.            - Patient is a 54 year old male Armenian speaking current smoker, no known PMH 2/2 not seeing a doctor for many years who presented to [facility] with chest pain with elevated troponin found to have hypertensive emergency and NSTEMI transferred to [facility] for management,  had left heart cath  8/10 showing 100% occlusion to mLAD s/p RADHA x1 now with HFrEF (25%), euvolemic.  Course complicated by NSVT s/p EP study that did not show inducible VT. Course further complicated by LV thrombus on coumadin      Admitted  NSTEMI (non-ST elevated myocardial infarction). discharge on current medication        Left ventricular thrombus with acute MI.  Plan: - LV thrombus. INR at goal. discharge     : NSVT (nonsustained ventricular tachycardia).  Plan: - EP study showed no inducible VT    - c/w beta blocker.      Poison ivy dermatitis.  continue with  clobetasol per dermatology     discharge on  prednisone taper,  40 mg po daily x 5days, then prednisone  days, then prednisone 20 mg po daily x 5 days, then prendisone 10 mg po daily x 5 days and stop    - prn atarax for itchiness.    Patient to follow up with Cardiology outpatient as schedule appoinement on thursday Patient is a 54 year old male Andorran speaking current smoker, no known PMH 2/2 not seeing a doctor for many years who presented to [facility] with chest pain with elevated troponin found to have hypertensive emergency and NSTEMI transferred to [facility] for management,  had left heart cath  8/10 showing 100% occlusion to mLAD s/p RADHA x1 now with HFrEF (25%), euvolemic.  Course complicated by NSVT s/p EP study that did not show inducible VT. Course further complicated by LV thrombus on coumadin as well as poison ivy dermatitis seen by derm and placed on prednisone taper.     Pt optimized after cath with medical management. He will need to follow up with general medicine this week for INR check and hospital follow up. He will subsequently need referral and follow up with cardiology.     Made appt for pt for 2 day follow up at general medicine clinic.

## 2019-08-22 ENCOUNTER — APPOINTMENT (OUTPATIENT)
Dept: INTERNAL MEDICINE | Facility: CLINIC | Age: 54
End: 2019-08-22

## 2019-08-22 ENCOUNTER — OUTPATIENT (OUTPATIENT)
Dept: OUTPATIENT SERVICES | Facility: HOSPITAL | Age: 54
LOS: 1 days | End: 2019-08-22
Payer: SELF-PAY

## 2019-08-22 ENCOUNTER — RESULT CHARGE (OUTPATIENT)
Age: 54
End: 2019-08-22

## 2019-08-22 VITALS — BODY MASS INDEX: 25.7 KG/M2 | HEIGHT: 68 IN

## 2019-08-22 VITALS
SYSTOLIC BLOOD PRESSURE: 115 MMHG | OXYGEN SATURATION: 98 % | WEIGHT: 169 LBS | HEART RATE: 63 BPM | DIASTOLIC BLOOD PRESSURE: 80 MMHG

## 2019-08-22 DIAGNOSIS — I47.2 VENTRICULAR TACHYCARDIA: ICD-10-CM

## 2019-08-22 DIAGNOSIS — L23.7 ALLERGIC CONTACT DERMATITIS DUE TO PLANTS, EXCEPT FOOD: ICD-10-CM

## 2019-08-22 DIAGNOSIS — K59.00 CONSTIPATION, UNSPECIFIED: ICD-10-CM

## 2019-08-22 DIAGNOSIS — I10 ESSENTIAL (PRIMARY) HYPERTENSION: ICD-10-CM

## 2019-08-22 DIAGNOSIS — Z87.828 PERSONAL HISTORY OF OTHER (HEALED) PHYSICAL INJURY AND TRAUMA: ICD-10-CM

## 2019-08-22 DIAGNOSIS — Z78.9 OTHER SPECIFIED HEALTH STATUS: ICD-10-CM
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
## 2019-08-22 DIAGNOSIS — Z87.891 PERSONAL HISTORY OF NICOTINE DEPENDENCE: ICD-10-CM

## 2019-08-22 DIAGNOSIS — Z12.11 ENCOUNTER FOR SCREENING FOR MALIGNANT NEOPLASM OF COLON: ICD-10-CM

## 2019-08-22 DIAGNOSIS — Z60.2 PROBLEMS RELATED TO LIVING ALONE: ICD-10-CM

## 2019-08-22 LAB
INR PPP: 3.4 RATIO
POCT-PROTHROMBIN TIME: 40.4 SECS
QUALITY CONTROL: NORMAL

## 2019-08-22 PROCEDURE — G0463: CPT

## 2019-08-22 SDOH — SOCIAL STABILITY - SOCIAL INSECURITY: PROBLEMS RELATED TO LIVING ALONE: Z60.2

## 2019-08-22 NOTE — PHYSICAL EXAM
[No Acute Distress] : no acute distress [Well Nourished] : well nourished [Well Developed] : well developed [Well-Appearing] : well-appearing [Normal Outer Ear/Nose] : the outer ears and nose were normal in appearance [Normal Oropharynx] : the oropharynx was normal [No JVD] : no jugular venous distention [No Respiratory Distress] : no respiratory distress  [Supple] : supple [No Accessory Muscle Use] : no accessory muscle use [Clear to Auscultation] : lungs were clear to auscultation bilaterally [Regular Rhythm] : with a regular rhythm [Normal Rate] : normal rate  [No Murmur] : no murmur heard [Normal S1, S2] : normal S1 and S2 [Pedal Pulses Present] : the pedal pulses are present [No Extremity Clubbing/Cyanosis] : no extremity clubbing/cyanosis [No Edema] : there was no peripheral edema [Soft] : abdomen soft [Non Tender] : non-tender [Normal Bowel Sounds] : normal bowel sounds [Non-distended] : non-distended [No Joint Swelling] : no joint swelling [Grossly Normal Strength/Tone] : grossly normal strength/tone [Coordination Grossly Intact] : coordination grossly intact [No Focal Deficits] : no focal deficits [Normal Gait] : normal gait [Normal] : affect was normal and insight and judgment were intact [de-identified] : Contact dermatitis on abdomen and b/l forearms healing well

## 2019-08-22 NOTE — REVIEW OF SYSTEMS
[Dyspnea on Exertion] : dyspnea on exertion [Constipation] : constipation [Negative] : Neurological [Fever] : no fever [Chills] : no chills [Recent Change In Weight] : ~T no recent weight change [Chest Pain] : no chest pain [Palpitations] : no palpitations [Lower Ext Edema] : no lower extremity edema [Orthopena] : no orthopnea [Shortness Of Breath] : no shortness of breath [Wheezing] : no wheezing [Cough] : no cough [Abdominal Pain] : no abdominal pain [Nausea] : no nausea [Diarrhea] : no diarrhea [Heartburn] : no heartburn [Vomiting] : no vomiting [Joint Stiffness] : no joint stiffness [Joint Pain] : no joint pain [Muscle Pain] : no muscle pain [Muscle Weakness] : no muscle weakness [Joint Swelling] : no joint swelling [Back Pain] : no back pain

## 2019-08-22 NOTE — HEALTH RISK ASSESSMENT
[] : Yes [0-5] : 0-5 [No] : No [0] : 2) Feeling down, depressed, or hopeless: Not at all (0) [YearQuit] : 2019 [de-identified] : Documentation from recent hospitalization reports 3-4 shots/day, but pt today reports no EtOH use.

## 2019-08-22 NOTE — HISTORY OF PRESENT ILLNESS
[[employer] ] : provided by [employer]   [FreeTextEntry8] : 54M w/ no reported PMHx presents to the office for INR monitoring. He has not seen a PMD since moving to the  from [place]. He was recently admitted to [facility] for 8/10 dull chest pain/pressure, found to have NSTEMI (100% occlusion to mLAD s/p RADHA x1), HFrEF (EF 25%) and LV thrombus on TTE. He was started on warfarin 5mg qd and has been taking the warfarin and all his other medications as directed. Reports dyspnea with minimal exertion (since discharge) but currently denies shortness of breath, chest pain/pressure, orthopnea, and leg swelling.\par \par He does not have any previous medical records, but reports that he was UTD on all his childhood vaccinations. He says that he had abdominal surgery before but has not had a colonoscopy or FOBT.  [FreeTextEntry1] : [id number]

## 2019-08-22 NOTE — COUNSELING
[Risk of tobacco use and health benefits of smoking cessation discussed] : Risk of tobacco use and health benefits of smoking cessation discussed [Willing to Quit Smoking] : Willing to quit smoking [Hazards of at-risk alcohol use discussed] : Hazards of at-risk alcohol use discussed [Quit Drinking] : Quit Drinking

## 2019-08-23 DIAGNOSIS — I23.6 THROMBOSIS OF ATRIUM, AURICULAR APPENDAGE, AND VENTRICLE AS CURRENT COMPLICATIONS FOLLOWING ACUTE MYOCARDIAL INFARCTION: ICD-10-CM

## 2019-08-23 DIAGNOSIS — I21.4 NON-ST ELEVATION (NSTEMI) MYOCARDIAL INFARCTION: ICD-10-CM

## 2019-08-23 DIAGNOSIS — I47.2 VENTRICULAR TACHYCARDIA: ICD-10-CM

## 2019-08-23 DIAGNOSIS — Z00.00 ENCOUNTER FOR GENERAL ADULT MEDICAL EXAMINATION WITHOUT ABNORMAL FINDINGS: ICD-10-CM

## 2019-08-23 DIAGNOSIS — I50.1 LEFT VENTRICULAR FAILURE, UNSPECIFIED: ICD-10-CM

## 2019-08-23 DIAGNOSIS — Z87.891 PERSONAL HISTORY OF NICOTINE DEPENDENCE: ICD-10-CM

## 2019-08-23 DIAGNOSIS — L23.7 ALLERGIC CONTACT DERMATITIS DUE TO PLANTS, EXCEPT FOOD: ICD-10-CM

## 2019-08-29 ENCOUNTER — OUTPATIENT (OUTPATIENT)
Dept: OUTPATIENT SERVICES | Facility: HOSPITAL | Age: 54
LOS: 1 days | End: 2019-08-29
Payer: SELF-PAY

## 2019-08-29 ENCOUNTER — RESULT CHARGE (OUTPATIENT)
Age: 54
End: 2019-08-29

## 2019-08-29 ENCOUNTER — APPOINTMENT (OUTPATIENT)
Dept: INTERNAL MEDICINE | Facility: CLINIC | Age: 54
End: 2019-08-29

## 2019-08-29 VITALS — SYSTOLIC BLOOD PRESSURE: 122 MMHG | DIASTOLIC BLOOD PRESSURE: 84 MMHG

## 2019-08-29 DIAGNOSIS — I50.1 LEFT VENTRICULAR FAILURE, UNSPECIFIED: ICD-10-CM

## 2019-08-29 DIAGNOSIS — I10 ESSENTIAL (PRIMARY) HYPERTENSION: ICD-10-CM

## 2019-08-29 DIAGNOSIS — Z79.01 LONG TERM (CURRENT) USE OF ANTICOAGULANTS: ICD-10-CM

## 2019-08-29 DIAGNOSIS — I47.2 VENTRICULAR TACHYCARDIA: ICD-10-CM

## 2019-08-29 DIAGNOSIS — I21.4 NON-ST ELEVATION (NSTEMI) MYOCARDIAL INFARCTION: ICD-10-CM

## 2019-08-29 DIAGNOSIS — Z00.00 ENCOUNTER FOR GENERAL ADULT MEDICAL EXAMINATION WITHOUT ABNORMAL FINDINGS: ICD-10-CM

## 2019-08-29 DIAGNOSIS — L23.7 ALLERGIC CONTACT DERMATITIS DUE TO PLANTS, EXCEPT FOOD: ICD-10-CM

## 2019-08-29 DIAGNOSIS — I23.6 THROMBOSIS OF ATRIUM, AURICULAR APPENDAGE, AND VENTRICLE AS CURRENT COMPLICATIONS FOLLOWING ACUTE MYOCARDIAL INFARCTION: ICD-10-CM
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
## 2019-08-29 DIAGNOSIS — Z87.891 PERSONAL HISTORY OF NICOTINE DEPENDENCE: ICD-10-CM

## 2019-08-29 LAB
INR PPP: 1.2 RATIO
POCT-PROTHROMBIN TIME: 14.6 SECS
QUALITY CONTROL: YES

## 2019-08-29 PROCEDURE — 85610 PROTHROMBIN TIME: CPT

## 2019-08-30 ENCOUNTER — APPOINTMENT (OUTPATIENT)
Dept: ELECTROPHYSIOLOGY | Facility: CLINIC | Age: 54
End: 2019-08-30

## 2019-08-30 VITALS — SYSTOLIC BLOOD PRESSURE: 128 MMHG | DIASTOLIC BLOOD PRESSURE: 84 MMHG | HEART RATE: 73 BPM | OXYGEN SATURATION: 98 %

## 2019-09-03 ENCOUNTER — RESULT CHARGE (OUTPATIENT)
Age: 54
End: 2019-09-03

## 2019-09-03 ENCOUNTER — RX RENEWAL (OUTPATIENT)
Age: 54
End: 2019-09-03

## 2019-09-03 ENCOUNTER — OUTPATIENT (OUTPATIENT)
Dept: OUTPATIENT SERVICES | Facility: HOSPITAL | Age: 54
LOS: 1 days | End: 2019-09-03
Payer: SELF-PAY

## 2019-09-03 ENCOUNTER — APPOINTMENT (OUTPATIENT)
Dept: INTERNAL MEDICINE | Facility: CLINIC | Age: 54
End: 2019-09-03

## 2019-09-03 ENCOUNTER — APPOINTMENT (OUTPATIENT)
Dept: CARDIOLOGY | Facility: HOSPITAL | Age: 54
End: 2019-09-03

## 2019-09-03 VITALS
DIASTOLIC BLOOD PRESSURE: 78 MMHG | HEART RATE: 65 BPM | HEIGHT: 68 IN | SYSTOLIC BLOOD PRESSURE: 117 MMHG | OXYGEN SATURATION: 98 %
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
## 2019-09-03 DIAGNOSIS — I10 ESSENTIAL (PRIMARY) HYPERTENSION: ICD-10-CM

## 2019-09-03 DIAGNOSIS — I50.1 LEFT VENTRICULAR FAILURE, UNSPECIFIED: ICD-10-CM

## 2019-09-03 DIAGNOSIS — Z00.00 ENCOUNTER FOR GENERAL ADULT MEDICAL EXAMINATION WITHOUT ABNORMAL FINDINGS: ICD-10-CM

## 2019-09-03 DIAGNOSIS — I21.4 NON-ST ELEVATION (NSTEMI) MYOCARDIAL INFARCTION: ICD-10-CM

## 2019-09-03 DIAGNOSIS — L23.7 ALLERGIC CONTACT DERMATITIS DUE TO PLANTS, EXCEPT FOOD: ICD-10-CM

## 2019-09-03 DIAGNOSIS — I23.6 THROMBOSIS OF ATRIUM, AURICULAR APPENDAGE, AND VENTRICLE AS CURRENT COMPLICATIONS FOLLOWING ACUTE MYOCARDIAL INFARCTION: ICD-10-CM

## 2019-09-03 DIAGNOSIS — Z87.891 PERSONAL HISTORY OF NICOTINE DEPENDENCE: ICD-10-CM

## 2019-09-03 DIAGNOSIS — I25.10 ATHEROSCLEROTIC HEART DISEASE OF NATIVE CORONARY ARTERY WITHOUT ANGINA PECTORIS: ICD-10-CM

## 2019-09-03 DIAGNOSIS — Z79.01 LONG TERM (CURRENT) USE OF ANTICOAGULANTS: ICD-10-CM

## 2019-09-03 DIAGNOSIS — I47.2 VENTRICULAR TACHYCARDIA: ICD-10-CM

## 2019-09-03 PROCEDURE — 93005 ELECTROCARDIOGRAM TRACING: CPT

## 2019-09-03 PROCEDURE — 85610 PROTHROMBIN TIME: CPT

## 2019-09-03 PROCEDURE — G0463: CPT

## 2019-09-03 NOTE — PHYSICAL EXAM
[General Appearance - Well Developed] : well developed [General Appearance - Well Nourished] : well nourished [Normal Conjunctiva] : the conjunctiva exhibited no abnormalities [Normal Oral Mucosa] : normal oral mucosa [Normal Oropharynx] : normal oropharynx [Normal Jugular Venous A Waves Present] : normal jugular venous A waves present [Normal Jugular Venous V Waves Present] : normal jugular venous V waves present [Heart Sounds] : normal S1 and S2 [Heart Rate And Rhythm] : heart rate and rhythm were normal [Murmurs] : no murmurs present [Arterial Pulses Normal] : the arterial pulses were normal [Respiration, Rhythm And Depth] : normal respiratory rhythm and effort [Auscultation Breath Sounds / Voice Sounds] : lungs were clear to auscultation bilaterally [Bowel Sounds] : normal bowel sounds [Abdomen Tenderness] : non-tender [Abdomen Soft] : soft [Abnormal Walk] : normal gait [Cyanosis, Localized] : no localized cyanosis [Nail Clubbing] : no clubbing of the fingernails [Petechial Hemorrhages (___cm)] : no petechial hemorrhages [Skin Color & Pigmentation] : normal skin color and pigmentation [Skin Turgor] : normal skin turgor [] : no rash [No Venous Stasis] : no venous stasis [Impaired Insight] : insight and judgment were intact [Oriented To Time, Place, And Person] : oriented to person, place, and time [Affect] : the affect was normal

## 2019-09-04 DIAGNOSIS — I23.6 THROMBOSIS OF ATRIUM, AURICULAR APPENDAGE, AND VENTRICLE AS CURRENT COMPLICATIONS FOLLOWING ACUTE MYOCARDIAL INFARCTION: ICD-10-CM

## 2019-09-04 DIAGNOSIS — I21.09 ST ELEVATION (STEMI) MYOCARDIAL INFARCTION INVOLVING OTHER CORONARY ARTERY OF ANTERIOR WALL: ICD-10-CM

## 2019-09-04 DIAGNOSIS — I50.22 CHRONIC SYSTOLIC (CONGESTIVE) HEART FAILURE: ICD-10-CM

## 2019-09-06 ENCOUNTER — APPOINTMENT (OUTPATIENT)
Dept: INTERNAL MEDICINE | Facility: CLINIC | Age: 54
End: 2019-09-06

## 2019-09-06 ENCOUNTER — OUTPATIENT (OUTPATIENT)
Dept: OUTPATIENT SERVICES | Facility: HOSPITAL | Age: 54
LOS: 1 days | End: 2019-09-06
Payer: SELF-PAY

## 2019-09-06 ENCOUNTER — RESULT CHARGE (OUTPATIENT)
Age: 54
End: 2019-09-06

## 2019-09-06 DIAGNOSIS — I47.2 VENTRICULAR TACHYCARDIA: ICD-10-CM
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
## 2019-09-06 DIAGNOSIS — I10 ESSENTIAL (PRIMARY) HYPERTENSION: ICD-10-CM

## 2019-09-06 DIAGNOSIS — L23.7 ALLERGIC CONTACT DERMATITIS DUE TO PLANTS, EXCEPT FOOD: ICD-10-CM

## 2019-09-06 DIAGNOSIS — I21.4 NON-ST ELEVATION (NSTEMI) MYOCARDIAL INFARCTION: ICD-10-CM

## 2019-09-06 DIAGNOSIS — I50.1 LEFT VENTRICULAR FAILURE, UNSPECIFIED: ICD-10-CM

## 2019-09-06 DIAGNOSIS — Z87.891 PERSONAL HISTORY OF NICOTINE DEPENDENCE: ICD-10-CM

## 2019-09-06 DIAGNOSIS — I23.6 THROMBOSIS OF ATRIUM, AURICULAR APPENDAGE, AND VENTRICLE AS CURRENT COMPLICATIONS FOLLOWING ACUTE MYOCARDIAL INFARCTION: ICD-10-CM

## 2019-09-06 DIAGNOSIS — Z00.00 ENCOUNTER FOR GENERAL ADULT MEDICAL EXAMINATION WITHOUT ABNORMAL FINDINGS: ICD-10-CM

## 2019-09-06 DIAGNOSIS — Z79.01 LONG TERM (CURRENT) USE OF ANTICOAGULANTS: ICD-10-CM

## 2019-09-06 PROCEDURE — 85610 PROTHROMBIN TIME: CPT

## 2019-09-09 ENCOUNTER — APPOINTMENT (OUTPATIENT)
Dept: INTERNAL MEDICINE | Facility: CLINIC | Age: 54
End: 2019-09-09

## 2019-09-09 ENCOUNTER — INPATIENT (INPATIENT)
Facility: HOSPITAL | Age: 54
LOS: 0 days | Discharge: HOME | DRG: 205 | End: 2019-09-10
Attending: HOSPITALIST | Admitting: HOSPITALIST
Payer: MEDICAID

## 2019-09-09 ENCOUNTER — RESULT CHARGE (OUTPATIENT)
Age: 54
End: 2019-09-09

## 2019-09-09 ENCOUNTER — APPOINTMENT (OUTPATIENT)
Dept: INTERNAL MEDICINE | Facility: CLINIC | Age: 54
End: 2019-09-09
Payer: MEDICAID

## 2019-09-09 ENCOUNTER — OUTPATIENT (OUTPATIENT)
Dept: OUTPATIENT SERVICES | Facility: HOSPITAL | Age: 54
LOS: 1 days | End: 2019-09-09
Payer: SELF-PAY

## 2019-09-09 VITALS
OXYGEN SATURATION: 99 % | HEART RATE: 62 BPM | RESPIRATION RATE: 20 BRPM | DIASTOLIC BLOOD PRESSURE: 106 MMHG | WEIGHT: 199.96 LBS | SYSTOLIC BLOOD PRESSURE: 159 MMHG | TEMPERATURE: 98 F

## 2019-09-09 VITALS
HEIGHT: 68 IN | BODY MASS INDEX: 21.98 KG/M2 | SYSTOLIC BLOOD PRESSURE: 100 MMHG | WEIGHT: 145 LBS | DIASTOLIC BLOOD PRESSURE: 70 MMHG

## 2019-09-09 VITALS — HEART RATE: 86 BPM

## 2019-09-09 DIAGNOSIS — I50.22 CHRONIC SYSTOLIC (CONGESTIVE) HEART FAILURE: ICD-10-CM

## 2019-09-09 DIAGNOSIS — I25.119 ATHEROSCLEROTIC HEART DISEASE OF NATIVE CORONARY ARTERY WITH UNSPECIFIED ANGINA PECTORIS: ICD-10-CM
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
## 2019-09-09 DIAGNOSIS — Z29.9 ENCOUNTER FOR PROPHYLACTIC MEASURES, UNSPECIFIED: ICD-10-CM

## 2019-09-09 DIAGNOSIS — I51.3 INTRACARDIAC THROMBOSIS, NOT ELSEWHERE CLASSIFIED: ICD-10-CM

## 2019-09-09 DIAGNOSIS — R07.89 OTHER CHEST PAIN: ICD-10-CM

## 2019-09-09 DIAGNOSIS — R07.9 CHEST PAIN, UNSPECIFIED: ICD-10-CM

## 2019-09-09 DIAGNOSIS — Z79.899 OTHER LONG TERM (CURRENT) DRUG THERAPY: ICD-10-CM

## 2019-09-09 DIAGNOSIS — Z95.5 PRESENCE OF CORONARY ANGIOPLASTY IMPLANT AND GRAFT: Chronic | ICD-10-CM

## 2019-09-09 LAB
ALBUMIN SERPL ELPH-MCNC: 3.8 G/DL — SIGNIFICANT CHANGE UP (ref 3.3–5)
ALP SERPL-CCNC: 63 U/L — SIGNIFICANT CHANGE UP (ref 40–120)
ALT FLD-CCNC: 72 U/L — HIGH (ref 10–45)
ANION GAP SERPL CALC-SCNC: 12 MMOL/L — SIGNIFICANT CHANGE UP (ref 5–17)
APTT BLD: 37.5 SEC — HIGH (ref 27.5–36.3)
AST SERPL-CCNC: 39 U/L — SIGNIFICANT CHANGE UP (ref 10–40)
BASOPHILS # BLD AUTO: 0 K/UL — SIGNIFICANT CHANGE UP (ref 0–0.2)
BASOPHILS NFR BLD AUTO: 0 % — SIGNIFICANT CHANGE UP (ref 0–2)
BILIRUB SERPL-MCNC: 0.2 MG/DL — SIGNIFICANT CHANGE UP (ref 0.2–1.2)
BUN SERPL-MCNC: 20 MG/DL — SIGNIFICANT CHANGE UP (ref 7–23)
CALCIUM SERPL-MCNC: 9 MG/DL — SIGNIFICANT CHANGE UP (ref 8.4–10.5)
CHLORIDE SERPL-SCNC: 103 MMOL/L — SIGNIFICANT CHANGE UP (ref 96–108)
CO2 SERPL-SCNC: 21 MMOL/L — LOW (ref 22–31)
CREAT SERPL-MCNC: 0.9 MG/DL — SIGNIFICANT CHANGE UP (ref 0.5–1.3)
EOSINOPHIL # BLD AUTO: 0.3 K/UL — SIGNIFICANT CHANGE UP (ref 0–0.5)
EOSINOPHIL NFR BLD AUTO: 3.5 % — SIGNIFICANT CHANGE UP (ref 0–6)
GLUCOSE SERPL-MCNC: 82 MG/DL — SIGNIFICANT CHANGE UP (ref 70–99)
HCT VFR BLD CALC: 36.9 % — LOW (ref 39–50)
HGB BLD-MCNC: 12.6 G/DL — LOW (ref 13–17)
INR BLD: 2.39 RATIO — HIGH (ref 0.88–1.16)
INR PPP: 1.2 RATIO
INR PPP: 2.2 RATIO
LYMPHOCYTES # BLD AUTO: 2.2 K/UL — SIGNIFICANT CHANGE UP (ref 1–3.3)
LYMPHOCYTES # BLD AUTO: 24 % — SIGNIFICANT CHANGE UP (ref 13–44)
MCHC RBC-ENTMCNC: 31.1 PG — SIGNIFICANT CHANGE UP (ref 27–34)
MCHC RBC-ENTMCNC: 34 GM/DL — SIGNIFICANT CHANGE UP (ref 32–36)
MCV RBC AUTO: 91.3 FL — SIGNIFICANT CHANGE UP (ref 80–100)
MONOCYTES # BLD AUTO: 1.2 K/UL — HIGH (ref 0–0.9)
MONOCYTES NFR BLD AUTO: 12.7 % — SIGNIFICANT CHANGE UP (ref 2–14)
NEUTROPHILS # BLD AUTO: 5.6 K/UL — SIGNIFICANT CHANGE UP (ref 1.8–7.4)
NEUTROPHILS NFR BLD AUTO: 59.8 % — SIGNIFICANT CHANGE UP (ref 43–77)
NT-PROBNP SERPL-SCNC: 267 PG/ML — SIGNIFICANT CHANGE UP (ref 0–300)
PLATELET # BLD AUTO: 227 K/UL — SIGNIFICANT CHANGE UP (ref 150–400)
POCT-PROTHROMBIN TIME: 14.9 SECS
POCT-PROTHROMBIN TIME: 25.9 SECS
POTASSIUM SERPL-MCNC: 3.9 MMOL/L — SIGNIFICANT CHANGE UP (ref 3.5–5.3)
POTASSIUM SERPL-SCNC: 3.9 MMOL/L — SIGNIFICANT CHANGE UP (ref 3.5–5.3)
PROT SERPL-MCNC: 7.6 G/DL — SIGNIFICANT CHANGE UP (ref 6–8.3)
PROTHROM AB SERPL-ACNC: 28 SEC — HIGH (ref 10–12.9)
QUALITY CONTROL: YES
QUALITY CONTROL: YES
RBC # BLD: 4.05 M/UL — LOW (ref 4.2–5.8)
RBC # FLD: 11.6 % — SIGNIFICANT CHANGE UP (ref 10.3–14.5)
SODIUM SERPL-SCNC: 136 MMOL/L — SIGNIFICANT CHANGE UP (ref 135–145)
TROPONIN T, HIGH SENSITIVITY RESULT: 11 NG/L — SIGNIFICANT CHANGE UP (ref 0–51)
TROPONIN T, HIGH SENSITIVITY RESULT: 9 NG/L — SIGNIFICANT CHANGE UP (ref 0–51)
WBC # BLD: 9.4 K/UL — SIGNIFICANT CHANGE UP (ref 3.8–10.5)
WBC # FLD AUTO: 9.4 K/UL — SIGNIFICANT CHANGE UP (ref 3.8–10.5)

## 2019-09-09 PROCEDURE — 99223 1ST HOSP IP/OBS HIGH 75: CPT | Mod: AI

## 2019-09-09 PROCEDURE — 99213 OFFICE O/P EST LOW 20 MIN: CPT | Mod: GE

## 2019-09-09 PROCEDURE — 93010 ELECTROCARDIOGRAM REPORT: CPT

## 2019-09-09 PROCEDURE — G0463: CPT

## 2019-09-09 PROCEDURE — 71046 X-RAY EXAM CHEST 2 VIEWS: CPT | Mod: 26

## 2019-09-09 PROCEDURE — 99285 EMERGENCY DEPT VISIT HI MDM: CPT

## 2019-09-09 RX ORDER — ASPIRIN/CALCIUM CARB/MAGNESIUM 324 MG
162 TABLET ORAL DAILY
Refills: 0 | Status: DISCONTINUED | OUTPATIENT
Start: 2019-09-09 | End: 2019-09-10

## 2019-09-09 RX ORDER — LISINOPRIL 2.5 MG/1
20 TABLET ORAL DAILY
Refills: 0 | Status: DISCONTINUED | OUTPATIENT
Start: 2019-09-09 | End: 2019-09-10

## 2019-09-09 RX ORDER — CLOPIDOGREL BISULFATE 75 MG/1
75 TABLET, FILM COATED ORAL DAILY
Refills: 0 | Status: DISCONTINUED | OUTPATIENT
Start: 2019-09-09 | End: 2019-09-10

## 2019-09-09 RX ORDER — METOPROLOL TARTRATE 50 MG
50 TABLET ORAL DAILY
Refills: 0 | Status: DISCONTINUED | OUTPATIENT
Start: 2019-09-09 | End: 2019-09-10

## 2019-09-09 RX ORDER — WARFARIN SODIUM 2.5 MG/1
1 TABLET ORAL
Qty: 7 | Refills: 0

## 2019-09-09 RX ORDER — HYDROXYZINE HCL 10 MG
1 TABLET ORAL
Qty: 120 | Refills: 0

## 2019-09-09 RX ORDER — ATORVASTATIN CALCIUM 80 MG/1
80 TABLET, FILM COATED ORAL AT BEDTIME
Refills: 0 | Status: DISCONTINUED | OUTPATIENT
Start: 2019-09-09 | End: 2019-09-10

## 2019-09-09 NOTE — ED ADULT TRIAGE NOTE - CHIEF COMPLAINT QUOTE
NSTEMI and LV thrombus 2 weeks ago, admitted to [facility].   Was at f/u appointment today for nontherapeutic INR/noncompliance with meds discovered at appointment last week.  Here today for chest pain, SOB.

## 2019-09-09 NOTE — ED PROVIDER NOTE - OBJECTIVE STATEMENT
Keila Rodriguez MD PGY-2  pt is a 55 yo M with PMH HTN, HF, NSTEMI, LV thrombus sent in from resident clinic for chest pain. patient stated he had an episode of chest pain lasting 3 hours last night associated with palpitations, now resolved. Sent in to the ED from clinic. Was at the clinic for an INR check. Per documentation, patient was supposed to have increased his Coumadin dose from5 mg to 10 mg, as well as begin Lovenox but he has not done those things. Of note, pt had recent admission for NSTEMI (had 100% LAD occlusion, EF 25% with LV thrombus), now s/p RADHA x 1. Denies chest pain, denies pleuritic chest pain. endorses bilateral calf pain

## 2019-09-09 NOTE — H&P ADULT - HISTORY OF PRESENT ILLNESS
55 yo M with PMH HTN, HF, NSTEMI, LV thrombus sent in from resident clinic for chest pain. patient stated he had an episode of chest pain lasting 3 hours last night associated with palpitations, now resolved. Sent in to the ED from clinic. Was at the clinic for an INR check. Per documentation, patient was supposed to have increased his Coumadin dose from5 mg to 10 mg, as well as begin Lovenox but he has not done those things. Of note, pt had recent admission for NSTEMI (had 100% LAD occlusion, EF 25% with LV thrombus), now s/p RADHA x 1. Denies chest pain, denies pleuritic chest pain. endorses bilateral calf pain 55 yo Bhut[place] M with PMH recent NSTEMI s/p LAD stent, HFrEF with EF 25%, LV thrombus on coumadin sent in from resident clinic for chest pain. patient stated he had an episode of chest pain lasting 3 hours last night associated with palpitations, now resolved. Sent in to the ED from clinic. Was at the clinic for an INR check. Per documentation, patient was supposed to have increased his Coumadin dose from5 mg to 10 mg, as well as begin Lovenox but he has not done those things. Of note, pt had recent admission for NSTEMI (had 100% LAD occlusion, EF 25% with LV thrombus), now s/p RADHA x 1. Denies chest pain, denies pleuritic chest pain. endorses bilateral calf pain 53 yo [place] M with PMH recent STEMI s/p LAD stent, HFrEF with EF 25%, LV thrombus on coumadin sent in from resident clinic for chest pain. patient stated he had an episode of chest pain lasting 3 hours last night associated with palpitations, now resolved. Sent in to the ED from clinic. Was at the clinic for an INR check. Per documentation, patient was supposed to have increased his Coumadin dose from5 mg to 10 mg, as well as begin Lovenox but he has not done those things. Of note, pt had recent admission for NSTEMI (had 100% LAD occlusion, EF 25% with LV thrombus), now s/p RADHA x 1. Denies chest pain, denies pleuritic chest pain. endorses bilateral calf pain 53 yo Bhutanese M with PMH recent NSTEMI s/p mLAD stent, HFrEF with EF 25%, LV thrombus on coumadin sent in from resident clinic for chest pain. Patient stated he had an episode of chest pain lasting 1-2 hours last night about 10pm  associated with palpitations.  His pain was mostly pounding/squeezing from his left back, towards front L chest, 5-6/10, while sitting in a chair, took 2 Tylenol with improvement of his symptoms. It was difficult for patient to compare this pain to his MI pain ("it hurt everywhere"). Patient presented to Medicine clinic for an INR check today and mentioned his chest pain, was sent in to ED for further evaluation.  Per documentation, patient was supposed to have increased his Coumadin dose from 5 mg to 10 mg, as well as begin Lovenox but he has not done those things.  He states that he is taking 2.5mg of Coumadin Sat-Mon and 5mg Tue-Fri.  He endorses bilateral calf pain today, now resolved.  Denies SOB, LOC/syncope, focal weakness, n/v, f/c, or urinary symptoms.  Currently no CP.

## 2019-09-09 NOTE — DISCUSSION/SUMMARY
[FreeTextEntry1] : 54 year old M pt with no significant PMH who presented as late presentation of NSTEMI found to have 100% pLAD lesion s/p RADHA. Now with HFrEF and LV thrombus.\par \par #NSTEMI s/p RADHA to pLAD now with HFrEF (25%), euvolemic.  \par -Continue DAPT\par -Continue atorvastatin 80  \par -Continue Toprol XL and lisinopril \par \par #LV thrombus\par -Continue Coumdin \par -Keep INR 2-3 \par \par #NSVT. EP study showed no inducible VT.\par -Cont BB as above\par \par #HFrEF:\par -Continue toprol XL and lisinopril as above\par -Repeat TTE 3 months from prior\par \par RTC in 1 month\par \par

## 2019-09-09 NOTE — ED ADULT NURSE NOTE - CHIEF COMPLAINT QUOTE
NSTEMI and LV thrombus 2 weeks ago, admitted to [facility].   Was at f/u appointment today for nontherapeutic INR/noncompliance with meds discovered at appointment last week.  Here today for chest pain, SOB.

## 2019-09-09 NOTE — H&P ADULT - PROBLEM SELECTOR PLAN 6
Patient is unable to confirm his medications, stated he takes 10 pills in the morning.  - will need to clarify all his meds in am

## 2019-09-09 NOTE — H&P ADULT - NSHPSOCIALHISTORY_GEN_ALL_CORE
Lives with Lives with friends, , wife and children are in another country (2 sons in [place]), used to work as a contractor, currently unable to work, former smoker, quit after recent MI, no alcohol

## 2019-09-09 NOTE — PHYSICAL EXAM
[No Acute Distress] : no acute distress [Well Nourished] : well nourished [Well Developed] : well developed [PERRL] : pupils equal round and reactive to light [Normal Sclera/Conjunctiva] : normal sclera/conjunctiva [Well-Appearing] : well-appearing [EOMI] : extraocular movements intact [Normal Outer Ear/Nose] : the outer ears and nose were normal in appearance [Normal Oropharynx] : the oropharynx was normal [No JVD] : no jugular venous distention [No Lymphadenopathy] : no lymphadenopathy [Thyroid Normal, No Nodules] : the thyroid was normal and there were no nodules present [No Respiratory Distress] : no respiratory distress  [Supple] : supple [No Accessory Muscle Use] : no accessory muscle use [Normal Rate] : normal rate  [Clear to Auscultation] : lungs were clear to auscultation bilaterally [Regular Rhythm] : with a regular rhythm [Normal S1, S2] : normal S1 and S2 [No Murmur] : no murmur heard [No Carotid Bruits] : no carotid bruits [No Abdominal Bruit] : a ~M bruit was not heard ~T in the abdomen [No Varicosities] : no varicosities [Pedal Pulses Present] : the pedal pulses are present [No Edema] : there was no peripheral edema [No Extremity Clubbing/Cyanosis] : no extremity clubbing/cyanosis [No Palpable Aorta] : no palpable aorta [Soft] : abdomen soft [Non Tender] : non-tender [Non-distended] : non-distended [No HSM] : no HSM [No Masses] : no abdominal mass palpated [Normal Bowel Sounds] : normal bowel sounds [Normal Posterior Cervical Nodes] : no posterior cervical lymphadenopathy [Normal Anterior Cervical Nodes] : no anterior cervical lymphadenopathy [No CVA Tenderness] : no CVA  tenderness [No Spinal Tenderness] : no spinal tenderness [No Joint Swelling] : no joint swelling [Grossly Normal Strength/Tone] : grossly normal strength/tone [No Rash] : no rash [Coordination Grossly Intact] : coordination grossly intact [No Focal Deficits] : no focal deficits [Normal Affect] : the affect was normal [Normal Gait] : normal gait [Deep Tendon Reflexes (DTR)] : deep tendon reflexes were 2+ and symmetric [Normal Insight/Judgement] : insight and judgment were intact

## 2019-09-09 NOTE — ED PROVIDER NOTE - ATTENDING CONTRIBUTION TO CARE
Dr. Wong (Attending Physician)  I performed a history and physical exam of the patient and discussed their management with the resident. I reviewed the resident's note and agree with the documented findings and plan of care. My medical decision making and observations are found above.
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
## 2019-09-09 NOTE — H&P ADULT - PROBLEM SELECTOR PLAN 1
chest pain 24 hrs ago, trop neg x2, EKG with T inv I, V4-6 (old), currently CP free  - will trend Trop in am  - tele monitor for arrhythmia, recent post MI c/b NSVT, not reproducible during EP stud  - Cardio eval to call in am  - will recheck TTE to eval LV function, thrombus and wall motion

## 2019-09-09 NOTE — ED ADULT NURSE NOTE - OBJECTIVE STATEMENT
Patient   is  alert   and  oriented  x3.   Color  is  good  and  skin  warm to touch.   He   is  c/o  chest  and  back  pain.   No  sob   or  diaphoresis   noted.

## 2019-09-09 NOTE — ED PROVIDER NOTE - CLINICAL SUMMARY MEDICAL DECISION MAKING FREE TEXT BOX
Dr. Wong (Attending Physician)  Pt. with recent admission 2 weeks ago for 100% LAD occlusion, EF 25% with LV thrombus, on coumadin and lovenox, now pw episode of chest pain last night. Will check cardiac enzymes, inr, ptt, give asa, cxr, likely admit.

## 2019-09-09 NOTE — ED PROVIDER NOTE - PHYSICAL EXAMINATION
PHYSICAL EXAM:   General: well-appearing, appears stated age, in no acute distress  HEENT: NC/AT,  airway patent  Cardiovascular: regular rate and rhythm, + S1/S2, no murmurs, rubs, gallops appreciated  Respiratory: clear to auscultation bilaterally, good aeration bilaterally, nonlabored respirations  Abdominal: soft, nontender, nondistended, no rebound, guarding or rigidity,  Extremities: no LE edema b/l. Mild b/l calf ttp without discoloration or edema  Psychiatric: appropriate mood and affect.   -Keila Rodriguez PGY-2

## 2019-09-09 NOTE — HISTORY OF PRESENT ILLNESS
[FreeTextEntry1] : 's Number: [id number]\par Language: Mosotho. \par \par 54 M w/ a PMHx of CAD/NSTEMI (100% occlusion to mLAD s/p RADHA x1), HFrEF (EF 25%) and LV thrombus on TTE presenting for post discharge follow up. He was recently admitted to [facility] for 8/10 dull chest pain/pressure, found to have NSTEMI (100% occlusion to mLAD s/p RADHA x1). Reports dyspnea with minimal exertion (since discharge) but currently denies shortness of breath, chest pain/pressure, orthopnea, and leg swelling. His INR today was 1.1, but he reports running out of warfarin and not taking it for the past 2 days. He reports taking all of his other medications as directed.\par \par 's Number: [id number]\par Language: Mosotho\par \par VENTRICLES: EF estimated was 25 %.\par CORONARY VESSELS: The coronary circulation is right dominant.\par LM:   --  LM: Normal.\par LAD:   --  Proximal LAD: There was a 100 % stenosis.\par CX:   --  Circumflex: Angiography showed minor luminal irregularities with\par no flow limiting lesions.\par RCA:   --  RCA: Angiography showed minor luminal irregularities with no\par flow limiting lesions.\par COMPLICATIONS: There were no complications.\par DIAGNOSTIC RECOMMENDATIONS:\par 1. Successful PCI to the mLAD with a 3.0mm RADHA post-dilated to 3.5mm in the\par setting of a STEMI.\par 2. DAPT.\par INTERVENTIONAL RECOMMENDATIONS:\par 1. Successful PCI to the mLAD with a 3.0mm RADHA post-dilated to 3.5mm in the\par setting of a STEMI.\par 2. DAPT.\par Prepared and signed by\par Shahid Rodriguez M.D.\par \par CONCLUSIONS:\par 1. The left ventricle is mildly dilated.  There is globaly\par hypokinesis with minor regional variation.  The LVEF about\par 25%.  There is a filling defect in the apical septum with\par Definity contrast (and suggested on the non-Defiinty image\par #59), consistent with a left ventricular apical thrombus.\par 2. The right ventricle is not well visualized.  On the\par parasternal and right ventricular outflow views the\par function is normal.  However, not all the walls are well\par seen as the apical views are limited.\par 3. There is no pericardial effusion.\par 4. There is no significant valvular regurgitation or\par stenosis.\par There are no prior studies available for comparison.\par Results discussed with Dr. Reggie Santos on 8/10/2019 at\par 4:33PM when these results were seen.\par \par \par

## 2019-09-09 NOTE — ED PROVIDER NOTE - PMH
No pertinent past medical history HFrEF (heart failure with reduced ejection fraction)    HTN (hypertension)    Left ventricular thrombus    NSTEMI (non-ST elevated myocardial infarction)  100% occlusion of LAD, now s/p RADHA x 1

## 2019-09-09 NOTE — H&P ADULT - NSICDXPASTMEDICALHX_GEN_ALL_CORE_FT
PAST MEDICAL HISTORY:  HFrEF (heart failure with reduced ejection fraction)     HTN (hypertension)     Left ventricular thrombus     NSTEMI (non-ST elevated myocardial infarction) 100% occlusion of LAD, now s/p RADHA x 1

## 2019-09-10 ENCOUNTER — TRANSCRIPTION ENCOUNTER (OUTPATIENT)
Age: 54
End: 2019-09-10

## 2019-09-10 ENCOUNTER — APPOINTMENT (OUTPATIENT)
Dept: INTERNAL MEDICINE | Facility: CLINIC | Age: 54
End: 2019-09-10

## 2019-09-10 VITALS
DIASTOLIC BLOOD PRESSURE: 85 MMHG | OXYGEN SATURATION: 97 % | RESPIRATION RATE: 18 BRPM | SYSTOLIC BLOOD PRESSURE: 123 MMHG | HEART RATE: 73 BPM | TEMPERATURE: 99 F

## 2019-09-10 LAB
ANION GAP SERPL CALC-SCNC: 12 MMOL/L — SIGNIFICANT CHANGE UP (ref 5–17)
BASOPHILS # BLD AUTO: 0 K/UL — SIGNIFICANT CHANGE UP (ref 0–0.2)
BASOPHILS NFR BLD AUTO: 0.3 % — SIGNIFICANT CHANGE UP (ref 0–2)
BUN SERPL-MCNC: 21 MG/DL — SIGNIFICANT CHANGE UP (ref 7–23)
CALCIUM SERPL-MCNC: 9.3 MG/DL — SIGNIFICANT CHANGE UP (ref 8.4–10.5)
CHLORIDE SERPL-SCNC: 102 MMOL/L — SIGNIFICANT CHANGE UP (ref 96–108)
CO2 SERPL-SCNC: 21 MMOL/L — LOW (ref 22–31)
CREAT SERPL-MCNC: 0.97 MG/DL — SIGNIFICANT CHANGE UP (ref 0.5–1.3)
EOSINOPHIL # BLD AUTO: 0.6 K/UL — HIGH (ref 0–0.5)
EOSINOPHIL NFR BLD AUTO: 9.2 % — HIGH (ref 0–6)
GLUCOSE SERPL-MCNC: 92 MG/DL — SIGNIFICANT CHANGE UP (ref 70–99)
HCT VFR BLD CALC: 37.9 % — LOW (ref 39–50)
HGB BLD-MCNC: 13 G/DL — SIGNIFICANT CHANGE UP (ref 13–17)
INR BLD: 2.16 RATIO — HIGH (ref 0.88–1.16)
LYMPHOCYTES # BLD AUTO: 1.9 K/UL — SIGNIFICANT CHANGE UP (ref 1–3.3)
LYMPHOCYTES # BLD AUTO: 30.4 % — SIGNIFICANT CHANGE UP (ref 13–44)
MCHC RBC-ENTMCNC: 30.8 PG — SIGNIFICANT CHANGE UP (ref 27–34)
MCHC RBC-ENTMCNC: 34.2 GM/DL — SIGNIFICANT CHANGE UP (ref 32–36)
MCV RBC AUTO: 90.2 FL — SIGNIFICANT CHANGE UP (ref 80–100)
MONOCYTES # BLD AUTO: 1 K/UL — HIGH (ref 0–0.9)
MONOCYTES NFR BLD AUTO: 15.5 % — HIGH (ref 2–14)
NEUTROPHILS # BLD AUTO: 2.8 K/UL — SIGNIFICANT CHANGE UP (ref 1.8–7.4)
NEUTROPHILS NFR BLD AUTO: 44.6 % — SIGNIFICANT CHANGE UP (ref 43–77)
PLATELET # BLD AUTO: 219 K/UL — SIGNIFICANT CHANGE UP (ref 150–400)
POTASSIUM SERPL-MCNC: 4.3 MMOL/L — SIGNIFICANT CHANGE UP (ref 3.5–5.3)
POTASSIUM SERPL-SCNC: 4.3 MMOL/L — SIGNIFICANT CHANGE UP (ref 3.5–5.3)
PROTHROM AB SERPL-ACNC: 25.2 SEC — HIGH (ref 10–12.9)
RBC # BLD: 4.2 M/UL — SIGNIFICANT CHANGE UP (ref 4.2–5.8)
RBC # FLD: 11.6 % — SIGNIFICANT CHANGE UP (ref 10.3–14.5)
SODIUM SERPL-SCNC: 135 MMOL/L — SIGNIFICANT CHANGE UP (ref 135–145)
TROPONIN T, HIGH SENSITIVITY RESULT: 12 NG/L — SIGNIFICANT CHANGE UP (ref 0–51)
WBC # BLD: 6.3 K/UL — SIGNIFICANT CHANGE UP (ref 3.8–10.5)
WBC # FLD AUTO: 6.3 K/UL — SIGNIFICANT CHANGE UP (ref 3.8–10.5)

## 2019-09-10 PROCEDURE — 99285 EMERGENCY DEPT VISIT HI MDM: CPT

## 2019-09-10 PROCEDURE — 80048 BASIC METABOLIC PNL TOTAL CA: CPT

## 2019-09-10 PROCEDURE — 85027 COMPLETE CBC AUTOMATED: CPT

## 2019-09-10 PROCEDURE — 83880 ASSAY OF NATRIURETIC PEPTIDE: CPT

## 2019-09-10 PROCEDURE — 85610 PROTHROMBIN TIME: CPT

## 2019-09-10 PROCEDURE — 85730 THROMBOPLASTIN TIME PARTIAL: CPT

## 2019-09-10 PROCEDURE — 93005 ELECTROCARDIOGRAM TRACING: CPT

## 2019-09-10 PROCEDURE — 99239 HOSP IP/OBS DSCHRG MGMT >30: CPT | Mod: GC

## 2019-09-10 PROCEDURE — 80053 COMPREHEN METABOLIC PANEL: CPT

## 2019-09-10 PROCEDURE — 71046 X-RAY EXAM CHEST 2 VIEWS: CPT

## 2019-09-10 PROCEDURE — 84484 ASSAY OF TROPONIN QUANT: CPT

## 2019-09-10 RX ORDER — WARFARIN SODIUM 2.5 MG/1
1 TABLET ORAL
Qty: 0 | Refills: 0 | DISCHARGE

## 2019-09-10 RX ORDER — ASPIRIN/CALCIUM CARB/MAGNESIUM 324 MG
1 TABLET ORAL
Qty: 30 | Refills: 0

## 2019-09-10 RX ORDER — WARFARIN SODIUM 2.5 MG/1
5 TABLET ORAL ONCE
Refills: 0 | Status: DISCONTINUED | OUTPATIENT
Start: 2019-09-10 | End: 2019-09-10

## 2019-09-10 RX ORDER — WARFARIN SODIUM 2.5 MG/1
1 TABLET ORAL
Qty: 20 | Refills: 0
Start: 2019-09-10 | End: 2019-09-29

## 2019-09-10 RX ORDER — METOPROLOL TARTRATE 50 MG
1 TABLET ORAL
Qty: 30 | Refills: 0

## 2019-09-10 RX ORDER — ATORVASTATIN CALCIUM 80 MG/1
1 TABLET, FILM COATED ORAL
Qty: 30 | Refills: 0

## 2019-09-10 RX ORDER — LISINOPRIL 2.5 MG/1
1 TABLET ORAL
Qty: 30 | Refills: 0

## 2019-09-10 RX ORDER — INFLUENZA VIRUS VACCINE 15; 15; 15; 15 UG/.5ML; UG/.5ML; UG/.5ML; UG/.5ML
0.5 SUSPENSION INTRAMUSCULAR ONCE
Refills: 0 | Status: COMPLETED | OUTPATIENT
Start: 2019-09-10 | End: 2019-09-10

## 2019-09-10 RX ORDER — CLOPIDOGREL BISULFATE 75 MG/1
1 TABLET, FILM COATED ORAL
Qty: 30 | Refills: 0

## 2019-09-10 RX ADMIN — LISINOPRIL 20 MILLIGRAM(S): 2.5 TABLET ORAL at 05:16

## 2019-09-10 RX ADMIN — CLOPIDOGREL BISULFATE 75 MILLIGRAM(S): 75 TABLET, FILM COATED ORAL at 11:41

## 2019-09-10 RX ADMIN — Medication 162 MILLIGRAM(S): at 11:41

## 2019-09-10 RX ADMIN — Medication 50 MILLIGRAM(S): at 05:16

## 2019-09-10 NOTE — PROGRESS NOTE ADULT - PROBLEM SELECTOR PLAN 1
chest pain 24 hrs ago, trop neg x2, EKG with T inv I, V4-6 (old), currently CP free  - Trop negative x3  - tele monitor for arrhythmia, recent post MI c/b NSVT, not reproducible during EP stud  - will recheck TTE to eval LV function, thrombus and wall motion  -consider cards consult if new sx, otherwise consider d/c today chest pain 24 hrs ago, trop neg x2, EKG with T inv I, V4-6 (old), currently CP free  - Trop negative x3  - tele monitor for arrhythmia, recent post MI c/b NSVT, not reproducible during EP stud  -CXR negative. no indication for TTE  -pain likely costochondritis, can follow-up with outpt PCP

## 2019-09-10 NOTE — PROGRESS NOTE ADULT - ATTENDING COMMENTS
atypical chest pain RESOLVED.  ACS ruled out  no evidence of decompensated CHF.  clear lungs  RRR  abd soft  no edema  INR therapeutic range.  med compliance and outpatient f/up reinforced.    eager to go home  close PCP/cardio f/up suggested.    Chris Shane MD, MHA, FACP, [facility]  Pager: [phone]  If no response or off-hours, page [phone]

## 2019-09-10 NOTE — DISCHARGE NOTE PROVIDER - CARE PROVIDER_API CALL
Internal Medicine Clinic,   [address] [place] [address], [address]  [place] [address]  Phone: [phone]  Fax: (   )    -  Follow Up Time: Internal Medicine Clinic,   [address] [place] [address], [address]  [place] [address]  Phone: [phone]  Fax: (   )    -  Follow Up Time: 1-3 days

## 2019-09-10 NOTE — DISCHARGE NOTE NURSING/CASE MANAGEMENT/SOCIAL WORK - NSDCFUADDAPPT_GEN_ALL_CORE_FT
Please see your primary care doctor in the resident clinic within 2-3 days of discharge. You can call [phone] to schedule an appointment.

## 2019-09-10 NOTE — DISCHARGE NOTE PROVIDER - PROVIDER TOKENS
FREE:[LAST:[Internal Medicine Clinic],PHONE:[phone]],FAX:[(   )    -],ADDRESS:[[address] [place] [address], [place] [address]]] FREE:[LAST:[Internal Medicine Clinic],PHONE:[phone]],FAX:[(   )    -],ADDRESS:[[address] [place] [address], [place] [address]],FOLLOWUP:[1-3 days]]

## 2019-09-10 NOTE — PROGRESS NOTE ADULT - PROBLEM SELECTOR PLAN 4
- INR down to 2.1 today, gave 5mg coumadin this morning  - Patient stated he is compliant with all meds - INR down to 2.1 today  - Patient stated he is compliant with all meds

## 2019-09-10 NOTE — DISCHARGE NOTE PROVIDER - HOSPITAL COURSE
53 yo Omani M with PMH recent NSTEMI s/p mLAD stent, HFrEF with EF 25%, LV thrombus on coumadin sent in from resident clinic for chest pain. Patient stated he had an episode of chest pain lasting 1-2 hours last night about 10pm  associated with palpitations. Pt was seen at [facility] about 1month ago after not seeing doctor for many years, had stent and LV thrombus at that time.    Last night, his pain was mostly pounding/squeezing from his left back, towards front L chest, 5-6/10, while sitting in a chair, took 2 Tylenol with improvement of his symptoms. It was difficult for patient to compare this pain to his MI pain ("it hurt everywhere"). Patient presented to Medicine clinic for an INR check today and mentioned his chest pain, was sent in to ED for further evaluation.  Per documentation, patient was supposed to have increased his Coumadin dose from 5 mg to 10 mg, as well as begin Lovenox but he has not done those things.  He states that he is taking 2.5mg of Coumadin Sat-Mon and 5mg Tue-Fri.  Denies SOB, LOC/syncope, focal weakness, n/v, f/c, or urinary symptoms. Repeat TTE performed which showed ____ 55 yo Bahamian M with PMH recent NSTEMI s/p mLAD stent, HFrEF with EF 25%, LV thrombus on coumadin sent in from resident clinic for chest pain. Patient stated he had an episode of chest pain lasting 1-2 hours last night about 10pm  associated with palpitations. Pt was seen at [facility] about 1month ago after not seeing doctor for many years, had stent and LV thrombus at that time.    Last night, his pain was mostly pounding/squeezing from his left back, towards front L chest, 5-6/10, while sitting in a chair, took 2 Tylenol with improvement of his symptoms. It was difficult for patient to compare this pain to his MI pain ("it hurt everywhere"). Patient presented to Medicine clinic for an INR check today and mentioned his chest pain, was sent in to ED for further evaluation.  Per documentation, patient was supposed to have increased his Coumadin dose from 5 mg to 10 mg, as well as begin Lovenox but he has not done those things.  He states that he is taking 2.5mg of Coumadin Sat-Mon and 5mg Tue-Fri.  Denies SOB, LOC/syncope, focal weakness, n/v, f/c, or urinary symptoms. CXR was normal. Pain resolved the next morning, and ROS was still completely negative. Chest pain was likely due to costochondritis, so no medication changes were needed. Pt was advised to follow-up with PCP. Pt was stable for discharge home.

## 2019-09-10 NOTE — DISCHARGE NOTE PROVIDER - NSDCFUADDAPPT_GEN_ALL_CORE_FT
Please see your primary care doctor in the resident clinic within 1 week of discharge. You can call [phone] to schedule an appointment. Please see your primary care doctor in the resident clinic within 2-3 days of discharge. You can call [phone] to schedule an appointment.

## 2019-09-10 NOTE — DISCHARGE NOTE PROVIDER - NSDCCPCAREPLAN_GEN_ALL_CORE_FT
PRINCIPAL DISCHARGE DIAGNOSIS  Diagnosis: Chest pain  Assessment and Plan of Treatment: PRINCIPAL DISCHARGE DIAGNOSIS  Diagnosis: Chest pain  Assessment and Plan of Treatment: Your chest pain was likely related to costochondritis, a muscle injury that occurs after physical activity. Your cardiac enzymes and EKG were negative for heart attacks, and your chest X-ray was also normal. Please continue taking your heart medications, and follow-up with your primary care doctor to continue treatment.

## 2019-09-10 NOTE — PROGRESS NOTE ADULT - SUBJECTIVE AND OBJECTIVE BOX
HPI:  53 yo Argentine M with PMH recent NSTEMI s/p mLAD stent, HFrEF with EF 25%, LV thrombus on coumadin sent in from resident clinic for chest pain. Patient stated he had an episode of chest pain lasting 1-2 hours last night about 10pm  associated with palpitations.  His pain was mostly pounding/squeezing from his left back, towards front L chest, 5-6/10, while sitting in a chair, took 2 Tylenol with improvement of his symptoms. It was difficult for patient to compare this pain to his MI pain ("it hurt everywhere"). Patient presented to Medicine clinic for an INR check today and mentioned his chest pain, was sent in to ED for further evaluation.  Per documentation, patient was supposed to have increased his Coumadin dose from 5 mg to 10 mg, as well as begin Lovenox but he has not done those things.  He states that he is taking 2.5mg of Coumadin Sat-Mon and 5mg Tue-Fri.  Denies SOB, LOC/syncope, focal weakness, n/v, f/c, or urinary symptoms.  Currently no CP. Of note, overnight team used  for part of the history, but pt declined after a point. Pt also declined  this morning.    SUBJECTIVE / OVERNIGHT EVENTS:  no acute events. Repeat EKG showed T-wave inversions, consistent with previous EKG. Pt still denying any chest pain, SOB, nausea, vomiting, headache. Slept well overnight after pain resolved.     MEDICATIONS  (STANDING):  aspirin  chewable 162 milliGRAM(s) Oral daily  atorvastatin 80 milliGRAM(s) Oral at bedtime  clopidogrel Tablet 75 milliGRAM(s) Oral daily  influenza   Vaccine 0.5 milliLiter(s) IntraMuscular once  lisinopril 20 milliGRAM(s) Oral daily  metoprolol succinate ER 50 milliGRAM(s) Oral daily  warfarin 5 milliGRAM(s) Oral once    MEDICATIONS  (PRN):    Vital Signs Last 24 Hrs  T(C): 36.7 (10 Sep 2019 04:40), Max: 37.2 (09 Sep 2019 19:58)  T(F): 98 (10 Sep 2019 04:40), Max: 98.9 (09 Sep 2019 19:58)  HR: 62 (10 Sep 2019 04:40) (61 - 65)  BP: 128/86 (10 Sep 2019 04:40) (128/86 - 159/106)  BP(mean): --  RR: 18 (10 Sep 2019 04:40) (18 - 20)  SpO2: 98% (10 Sep 2019 04:40) (98% - 99%)    CAPILLARY BLOOD GLUCOSE        I&O's Summary    09 Sep 2019 07:01  -  10 Sep 2019 07:00  --------------------------------------------------------  IN: 720 mL / OUT: 0 mL / NET: 720 mL        PHYSICAL EXAM  GENERAL: NAD, appears stated age  HEAD:  Atraumatic, normocephalic  EYES: EOMI, PERRLA, conjunctiva and sclera clear  NECK: Supple, No JVD  CHEST/LUNG: Clear to auscultation bilaterally; No wheeze  HEART: Regular rate and rhythm; No murmurs, rubs, or gallops  ABDOMEN: Soft, Nontender, Nondistended; Bowel sounds present  EXTREMITIES:  2+ Peripheral Pulses, No clubbing, cyanosis, or edema  NEURO/PSYCH: AAOx3, nonfocal  SKIN: No rashes or lesions      LABS:                        13.0   6.3   )-----------( 219      ( 10 Sep 2019 06:15 )             37.9     Hemoglobin: 13.0 g/dL (09-10 @ 06:15)  Hemoglobin: 12.6 g/dL (09-09 @ 16:38)    CBC Full  -  ( 10 Sep 2019 06:15 )  WBC Count : 6.3 K/uL  RBC Count : 4.20 M/uL  Hemoglobin : 13.0 g/dL  Hematocrit : 37.9 %  Platelet Count - Automated : 219 K/uL  Mean Cell Volume : 90.2 fl  Mean Cell Hemoglobin : 30.8 pg  Mean Cell Hemoglobin Concentration : 34.2 gm/dL  Auto Neutrophil # : 2.8 K/uL  Auto Lymphocyte # : 1.9 K/uL  Auto Monocyte # : 1.0 K/uL  Auto Eosinophil # : 0.6 K/uL  Auto Basophil # : 0.0 K/uL  Auto Neutrophil % : 44.6 %  Auto Lymphocyte % : 30.4 %  Auto Monocyte % : 15.5 %  Auto Eosinophil % : 9.2 %  Auto Basophil % : 0.3 %    09-10    135  |  102  |  21  ----------------------------<  92  4.3   |  21<L>  |  0.97    Ca    9.3      10 Sep 2019 06:13    TPro  7.6  /  Alb  3.8  /  TBili  0.2  /  DBili  x   /  AST  39  /  ALT  72<H>  /  AlkPhos  63  09-09    Creatinine Trend: 0.97<--, 0.90<--, 0.96<--, 1.04<--, 0.98<--, 1.03<--  LIVER FUNCTIONS - ( 09 Sep 2019 16:38 )  Alb: 3.8 g/dL / Pro: 7.6 g/dL / ALK PHOS: 63 U/L / ALT: 72 U/L / AST: 39 U/L / GGT: x           PT/INR - ( 10 Sep 2019 06:13 )   PT: 25.2 sec;   INR: 2.16 ratio         PTT - ( 09 Sep 2019 16:38 )  PTT:37.5 sec    hs Troponin:                12 <<== 09-10-19 @ 06:13                11 <<== 09-09-19 @ 18:44                9 <<== 09-09-19 @ 16:38 HPI:  55 yo Swiss M with PMH recent NSTEMI s/p mLAD stent, HFrEF with EF 25%, LV thrombus on coumadin sent in from resident clinic for chest pain. Patient stated he had an episode of chest pain lasting 1-2 hours last night about 10pm  associated with palpitations. Pt was seen at [place] about 1month ago after not seeing doctor for many years, had stent and LV thrombus at that time.  Last night, his pain was mostly pounding/squeezing from his left back, towards front L chest, 5-6/10, while sitting in a chair, took 2 Tylenol with improvement of his symptoms. It was difficult for patient to compare this pain to his MI pain ("it hurt everywhere"). Patient presented to Medicine clinic for an INR check today and mentioned his chest pain, was sent in to ED for further evaluation.  Per documentation, patient was supposed to have increased his Coumadin dose from 5 mg to 10 mg, as well as begin Lovenox but he has not done those things.  He states that he is taking 2.5mg of Coumadin Sat-Mon and 5mg Tue-Fri.  Denies SOB, LOC/syncope, focal weakness, n/v, f/c, or urinary symptoms.  Currently no CP. Of note, overnight team used  for part of the history, but pt declined after a point. Pt also declined  this morning.    SUBJECTIVE / OVERNIGHT EVENTS:  no acute events. Repeat EKG showed T-wave inversions, consistent with previous EKG. Pt still denying any chest pain, SOB, nausea, vomiting, headache. Slept well overnight after pain resolved.     MEDICATIONS  (STANDING):  aspirin  chewable 162 milliGRAM(s) Oral daily  atorvastatin 80 milliGRAM(s) Oral at bedtime  clopidogrel Tablet 75 milliGRAM(s) Oral daily  influenza   Vaccine 0.5 milliLiter(s) IntraMuscular once  lisinopril 20 milliGRAM(s) Oral daily  metoprolol succinate ER 50 milliGRAM(s) Oral daily  warfarin 5 milliGRAM(s) Oral once    MEDICATIONS  (PRN):    Vital Signs Last 24 Hrs  T(C): 36.7 (10 Sep 2019 04:40), Max: 37.2 (09 Sep 2019 19:58)  T(F): 98 (10 Sep 2019 04:40), Max: 98.9 (09 Sep 2019 19:58)  HR: 62 (10 Sep 2019 04:40) (61 - 65)  BP: 128/86 (10 Sep 2019 04:40) (128/86 - 159/106)  BP(mean): --  RR: 18 (10 Sep 2019 04:40) (18 - 20)  SpO2: 98% (10 Sep 2019 04:40) (98% - 99%)    CAPILLARY BLOOD GLUCOSE        I&O's Summary    09 Sep 2019 07:01  -  10 Sep 2019 07:00  --------------------------------------------------------  IN: 720 mL / OUT: 0 mL / NET: 720 mL        PHYSICAL EXAM  GENERAL: NAD, appears stated age  HEAD:  Atraumatic, normocephalic  EYES: EOMI, PERRLA, conjunctiva and sclera clear  NECK: Supple, No JVD  CHEST/LUNG: Clear to auscultation bilaterally; No wheeze  HEART: Regular rate and rhythm; No murmurs, rubs, or gallops  ABDOMEN: Soft, Nontender, Nondistended; Bowel sounds present  EXTREMITIES:  2+ Peripheral Pulses, No clubbing, cyanosis, or edema  NEURO/PSYCH: AAOx3, nonfocal  SKIN: No rashes or lesions      LABS:                        13.0   6.3   )-----------( 219      ( 10 Sep 2019 06:15 )             37.9     Hemoglobin: 13.0 g/dL (09-10 @ 06:15)  Hemoglobin: 12.6 g/dL (09-09 @ 16:38)    CBC Full  -  ( 10 Sep 2019 06:15 )  WBC Count : 6.3 K/uL  RBC Count : 4.20 M/uL  Hemoglobin : 13.0 g/dL  Hematocrit : 37.9 %  Platelet Count - Automated : 219 K/uL  Mean Cell Volume : 90.2 fl  Mean Cell Hemoglobin : 30.8 pg  Mean Cell Hemoglobin Concentration : 34.2 gm/dL  Auto Neutrophil # : 2.8 K/uL  Auto Lymphocyte # : 1.9 K/uL  Auto Monocyte # : 1.0 K/uL  Auto Eosinophil # : 0.6 K/uL  Auto Basophil # : 0.0 K/uL  Auto Neutrophil % : 44.6 %  Auto Lymphocyte % : 30.4 %  Auto Monocyte % : 15.5 %  Auto Eosinophil % : 9.2 %  Auto Basophil % : 0.3 %    09-10    135  |  102  |  21  ----------------------------<  92  4.3   |  21<L>  |  0.97    Ca    9.3      10 Sep 2019 06:13    TPro  7.6  /  Alb  3.8  /  TBili  0.2  /  DBili  x   /  AST  39  /  ALT  72<H>  /  AlkPhos  63  09-09    Creatinine Trend: 0.97<--, 0.90<--, 0.96<--, 1.04<--, 0.98<--, 1.03<--  LIVER FUNCTIONS - ( 09 Sep 2019 16:38 )  Alb: 3.8 g/dL / Pro: 7.6 g/dL / ALK PHOS: 63 U/L / ALT: 72 U/L / AST: 39 U/L / GGT: x           PT/INR - ( 10 Sep 2019 06:13 )   PT: 25.2 sec;   INR: 2.16 ratio         PTT - ( 09 Sep 2019 16:38 )  PTT:37.5 sec    hs Troponin:                12 <<== 09-10-19 @ 06:13                11 <<== 09-09-19 @ 18:44                9 <<== 09-09-19 @ 16:38

## 2019-09-10 NOTE — PROGRESS NOTE ADULT - ASSESSMENT
55 yo Egyptian M with PMH recent NSTEMI s/p mLAD stent, HFrEF with EF 25%, LV thrombus on coumadin p/w chest pain admitted to eval for ACS.

## 2019-09-10 NOTE — DISCHARGE NOTE NURSING/CASE MANAGEMENT/SOCIAL WORK - PATIENT PORTAL LINK FT
You can access the FollowMyHealth Patient Portal offered by [facility] by registering at the following website: [email or web address]. By joining [employer]’s FollowMyHealth portal, you will also be able to view your health information using other applications (apps) compatible with our system.

## 2019-09-10 NOTE — PROGRESS NOTE ADULT - PROBLEM SELECTOR PLAN 5
therapeutic on coumadin, INR 2.4 DVT: INR down to 2.1, will give 5mg warfarin this PM  Diet: DASH  Dispo: pending TTE and r/o ACS DVT: INR down to 2.1  Diet: DASH  Dispo: today

## 2019-09-12 PROBLEM — I51.3 INTRACARDIAC THROMBOSIS, NOT ELSEWHERE CLASSIFIED: Chronic | Status: ACTIVE | Noted: 2019-09-09

## 2019-09-12 PROBLEM — I21.4 NON-ST ELEVATION (NSTEMI) MYOCARDIAL INFARCTION: Chronic | Status: ACTIVE | Noted: 2019-09-09

## 2019-09-12 PROBLEM — I50.20 UNSPECIFIED SYSTOLIC (CONGESTIVE) HEART FAILURE: Chronic | Status: ACTIVE | Noted: 2019-09-09

## 2019-09-12 PROBLEM — I10 ESSENTIAL (PRIMARY) HYPERTENSION: Chronic | Status: ACTIVE | Noted: 2019-09-09

## 2019-09-13 ENCOUNTER — OUTPATIENT (OUTPATIENT)
Dept: OUTPATIENT SERVICES | Facility: HOSPITAL | Age: 54
LOS: 1 days | End: 2019-09-13
Payer: SELF-PAY

## 2019-09-13 ENCOUNTER — APPOINTMENT (OUTPATIENT)
Dept: INTERNAL MEDICINE | Facility: CLINIC | Age: 54
End: 2019-09-13

## 2019-09-13 ENCOUNTER — RESULT CHARGE (OUTPATIENT)
Age: 54
End: 2019-09-13

## 2019-09-13 VITALS
WEIGHT: 176 LBS | DIASTOLIC BLOOD PRESSURE: 90 MMHG | BODY MASS INDEX: 26.67 KG/M2 | SYSTOLIC BLOOD PRESSURE: 120 MMHG | HEIGHT: 68 IN
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
## 2019-09-13 DIAGNOSIS — I23.6 THROMBOSIS OF ATRIUM, AURICULAR APPENDAGE, AND VENTRICLE AS CURRENT COMPLICATIONS FOLLOWING ACUTE MYOCARDIAL INFARCTION: ICD-10-CM

## 2019-09-13 DIAGNOSIS — Z95.5 PRESENCE OF CORONARY ANGIOPLASTY IMPLANT AND GRAFT: Chronic | ICD-10-CM

## 2019-09-13 DIAGNOSIS — Z00.00 ENCOUNTER FOR GENERAL ADULT MEDICAL EXAMINATION W/OUT ABNORMAL FINDINGS: ICD-10-CM

## 2019-09-13 DIAGNOSIS — I50.1 LEFT VENTRICULAR FAILURE, UNSPECIFIED: ICD-10-CM

## 2019-09-13 DIAGNOSIS — I21.4 NON-ST ELEVATION (NSTEMI) MYOCARDIAL INFARCTION: ICD-10-CM

## 2019-09-13 DIAGNOSIS — I10 ESSENTIAL (PRIMARY) HYPERTENSION: ICD-10-CM

## 2019-09-13 LAB
INR PPP: 1.1 RATIO
INR PPP: 1.8 RATIO
POCT-PROTHROMBIN TIME: 13.5 SECS
POCT-PROTHROMBIN TIME: 22.8 SECS
QUALITY CONTROL: YES
QUALITY CONTROL: YES

## 2019-09-13 PROCEDURE — G0463: CPT

## 2019-09-13 RX ORDER — CLOBETASOL PROPIONATE 0.5 MG/G
0.05 CREAM TOPICAL TWICE DAILY
Refills: 0 | Status: DISCONTINUED | COMMUNITY
Start: 2019-08-22 | End: 2019-09-13

## 2019-09-13 RX ORDER — HYDROXYZINE HYDROCHLORIDE 25 MG/1
25 TABLET ORAL
Refills: 0 | Status: DISCONTINUED | COMMUNITY
Start: 2019-08-22 | End: 2019-09-13

## 2019-09-13 RX ORDER — CALAMINE
LOTION (ML) TOPICAL
Refills: 0 | Status: DISCONTINUED | COMMUNITY
Start: 2019-08-22 | End: 2019-09-13

## 2019-09-13 RX ORDER — PREDNISONE 10 MG/1
10 TABLET ORAL
Refills: 0 | Status: DISCONTINUED | COMMUNITY
Start: 2019-08-22 | End: 2019-09-13

## 2019-09-13 RX ORDER — DOCUSATE SODIUM 100 MG
100 TABLET ORAL DAILY
Refills: 0 | Status: DISCONTINUED | COMMUNITY
Start: 2019-08-22 | End: 2019-09-13

## 2019-09-13 RX ORDER — ENOXAPARIN SODIUM 100 MG/ML
80 INJECTION SUBCUTANEOUS
Qty: 8 | Refills: 0 | Status: DISCONTINUED | COMMUNITY
Start: 2019-09-06 | End: 2019-09-13

## 2019-09-13 RX ORDER — POLYETHYLENE GLYCOL 3350 17 G/17G
17 POWDER, FOR SOLUTION ORAL DAILY
Refills: 0 | Status: DISCONTINUED | COMMUNITY
Start: 2019-08-22 | End: 2019-09-13

## 2019-09-13 NOTE — PLAN
[FreeTextEntry1] : #LV Thrombus\par - Repeat INR (POCT) was 1.9. Patient encouraged to take Coumadin 5 q Day\par - Will reach out to cardiologist to possibly change Coumadin to a NOAC given patient's history of medication non-adherence.\par - RTC on Tuesday for a repeat INR\par \par #NSTEMI\par - Continue medical management\par - Cardiology f/u in a few weeks\par \par #HCM\par - RTC for CPE

## 2019-09-13 NOTE — PHYSICAL EXAM
[No Acute Distress] : no acute distress [Well Nourished] : well nourished [Well Developed] : well developed [Normal Sclera/Conjunctiva] : normal sclera/conjunctiva [Well-Appearing] : well-appearing [PERRL] : pupils equal round and reactive to light [EOMI] : extraocular movements intact [Normal Oropharynx] : the oropharynx was normal [Normal Outer Ear/Nose] : the outer ears and nose were normal in appearance [No JVD] : no jugular venous distention [No Lymphadenopathy] : no lymphadenopathy [Supple] : supple [Thyroid Normal, No Nodules] : the thyroid was normal and there were no nodules present [No Respiratory Distress] : no respiratory distress  [Clear to Auscultation] : lungs were clear to auscultation bilaterally [No Accessory Muscle Use] : no accessory muscle use [Normal Rate] : normal rate  [Regular Rhythm] : with a regular rhythm [Normal S1, S2] : normal S1 and S2 [No Murmur] : no murmur heard [No Abdominal Bruit] : a ~M bruit was not heard ~T in the abdomen [No Carotid Bruits] : no carotid bruits [No Varicosities] : no varicosities [Pedal Pulses Present] : the pedal pulses are present [No Palpable Aorta] : no palpable aorta [No Edema] : there was no peripheral edema [No Extremity Clubbing/Cyanosis] : no extremity clubbing/cyanosis [Non Tender] : non-tender [Soft] : abdomen soft [No Masses] : no abdominal mass palpated [Non-distended] : non-distended [Normal Bowel Sounds] : normal bowel sounds [No HSM] : no HSM [Normal Anterior Cervical Nodes] : no anterior cervical lymphadenopathy [Normal Posterior Cervical Nodes] : no posterior cervical lymphadenopathy [No Spinal Tenderness] : no spinal tenderness [No CVA Tenderness] : no CVA  tenderness [No Joint Swelling] : no joint swelling [Grossly Normal Strength/Tone] : grossly normal strength/tone [No Rash] : no rash [Coordination Grossly Intact] : coordination grossly intact [Normal Gait] : normal gait [No Focal Deficits] : no focal deficits [Deep Tendon Reflexes (DTR)] : deep tendon reflexes were 2+ and symmetric [Normal Affect] : the affect was normal [Normal Insight/Judgement] : insight and judgment were intact [66833 - Moderate Complexity requires multiple possible diagnoses and/or the management options, moderate complexity of the medical data (tests, etc.) to be reviewed, and moderate risk of significant complications, morbidity, and/or mortality as well as co] : Moderate Complexity

## 2019-09-13 NOTE — HEALTH RISK ASSESSMENT
[Intercurrent ED visits] : went to ED [Intercurrent hospitalizations] : was admitted to the hospital  [Yes] : Yes [] : No

## 2019-09-13 NOTE — HISTORY OF PRESENT ILLNESS
[Post-hospitalization from ___ Hospital] : Post-hospitalization from [unfilled] Hospital [Discharge Summary] : discharge summary [Pertinent Labs] : pertinent labs [Radiology Findings] : radiology findings [Discharge Med List] : discharge medication list [Med Reconciliation] : medication reconciliation has been completed [FreeTextEntry2] : Patient is a 53 yo male with recenlty dx HFrEF (25%), LV thrombus, and NSTEMI who was sent to the ED for pleurtic chest pain in the setting of being subtherapeutic on INR. Patient was admitted to [facility] for 1 day for r/o ACS. His troponins and CT angio was negative. Patinet was d/c and recommended to have close d/c follow up.

## 2019-09-23 ENCOUNTER — APPOINTMENT (OUTPATIENT)
Dept: INTERNAL MEDICINE | Facility: CLINIC | Age: 54
End: 2019-09-23

## 2019-09-23 ENCOUNTER — OUTPATIENT (OUTPATIENT)
Dept: OUTPATIENT SERVICES | Facility: HOSPITAL | Age: 54
LOS: 1 days | End: 2019-09-23
Payer: SELF-PAY

## 2019-09-23 ENCOUNTER — RESULT CHARGE (OUTPATIENT)
Age: 54
End: 2019-09-23

## 2019-09-23 DIAGNOSIS — Z79.01 LONG TERM (CURRENT) USE OF ANTICOAGULANTS: ICD-10-CM

## 2019-09-23 DIAGNOSIS — I10 ESSENTIAL (PRIMARY) HYPERTENSION: ICD-10-CM
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
## 2019-09-23 DIAGNOSIS — Z95.5 PRESENCE OF CORONARY ANGIOPLASTY IMPLANT AND GRAFT: Chronic | ICD-10-CM

## 2019-09-23 LAB
INR PPP: 2.8 RATIO
POCT-PROTHROMBIN TIME: 33.3 SECS
QUALITY CONTROL: YES

## 2019-09-23 PROCEDURE — 85610 PROTHROMBIN TIME: CPT

## 2019-09-24 DIAGNOSIS — I21.4 NON-ST ELEVATION (NSTEMI) MYOCARDIAL INFARCTION: ICD-10-CM

## 2019-09-24 DIAGNOSIS — I50.1 LEFT VENTRICULAR FAILURE, UNSPECIFIED: ICD-10-CM

## 2019-09-24 DIAGNOSIS — I23.6 THROMBOSIS OF ATRIUM, AURICULAR APPENDAGE, AND VENTRICLE AS CURRENT COMPLICATIONS FOLLOWING ACUTE MYOCARDIAL INFARCTION: ICD-10-CM

## 2019-09-28 ENCOUNTER — NON-APPOINTMENT (OUTPATIENT)
Age: 54
End: 2019-09-28

## 2019-09-28 NOTE — PLAN
[FreeTextEntry1] : 54M recently admitted to [facility] found to have NSTEMI (100% occlusion to mLAD s/p RADHA x1), HFrEF (EF 25%) and LV thrombus on TTE who presented to clinic for an INR check c/o chest pain, palpitations, and shortness of breath i/s/o subtherapetic INR, concerning for a pulmonary embolism.\par - Patient sent immediately to the emergency room.\par - Patient will need a BMP (to assess creatinine), CT Angio vs V/Q Scan to assess for PE, and possibly transthoracic echo (LV thrombus) and lower extremity dopplers to r/o DVTs.

## 2019-09-28 NOTE — HISTORY OF PRESENT ILLNESS
[FreeTextEntry8] : 54M recently admitted to [facility] found to have NSTEMI (100% occlusion to mLAD s/p RADHA x1), HFrEF (EF 25%) and LV thrombus on TTE came to clinic for an INR check. \par \par Patient was found to have an INR on 9/3 of 1.1 at the outpatient clinic. He was recommended to increase his Coumadin dose to 10 mg q Day (from 5 mg q Day) as well as start a Lovenox bridge. He returned to clinic today on 9/9, had an INR of 2.2 today. During nursing visit for an INR check, patient complained of chest pain, shortness of breath, and leg pain. He states that he is not actively having these symptoms currently. However, he endorsed a 3 hour episode of chest pain and palpitations last night. Of note, patient was non-adherent to his medication regimen. Patient took Coumadin 5 q day instead of Coumdain 10 mg q day. In addition, patient delayed picking up his Lovenox injections. Therefore, did not take any of his Lovenox.

## 2019-09-28 NOTE — REVIEW OF SYSTEMS
[Chest Pain] : chest pain [Palpitations] : palpitations [Lower Ext Edema] : lower extremity edema [Claudication] : leg claudication [Orthopena] : orthopnea [Shortness Of Breath] : shortness of breath [Dyspnea on Exertion] : dyspnea on exertion [Negative] : Musculoskeletal [Fever] : no fever [Chills] : no chills [Paroysmal Nocturnal Dyspnea] : no paroysmal nocturnal dyspnea [Wheezing] : no wheezing [Cough] : no cough

## 2019-09-30 ENCOUNTER — APPOINTMENT (OUTPATIENT)
Dept: INTERNAL MEDICINE | Facility: CLINIC | Age: 54
End: 2019-09-30

## 2019-09-30 ENCOUNTER — RESULT CHARGE (OUTPATIENT)
Age: 54
End: 2019-09-30

## 2019-09-30 ENCOUNTER — OUTPATIENT (OUTPATIENT)
Dept: OUTPATIENT SERVICES | Facility: HOSPITAL | Age: 54
LOS: 1 days | End: 2019-09-30
Payer: SELF-PAY
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
## 2019-09-30 DIAGNOSIS — Z95.5 PRESENCE OF CORONARY ANGIOPLASTY IMPLANT AND GRAFT: Chronic | ICD-10-CM

## 2019-09-30 LAB
INR PPP: 1.7 RATIO
POCT-PROTHROMBIN TIME: 20.1 SECS
QUALITY CONTROL: YES

## 2019-09-30 PROCEDURE — 85610 PROTHROMBIN TIME: CPT

## 2019-10-01 DIAGNOSIS — I21.4 NON-ST ELEVATION (NSTEMI) MYOCARDIAL INFARCTION: ICD-10-CM

## 2019-10-01 DIAGNOSIS — I23.6 THROMBOSIS OF ATRIUM, AURICULAR APPENDAGE, AND VENTRICLE AS CURRENT COMPLICATIONS FOLLOWING ACUTE MYOCARDIAL INFARCTION: ICD-10-CM

## 2019-10-01 DIAGNOSIS — I50.1 LEFT VENTRICULAR FAILURE, UNSPECIFIED: ICD-10-CM

## 2019-10-01 DIAGNOSIS — Z79.01 LONG TERM (CURRENT) USE OF ANTICOAGULANTS: ICD-10-CM

## 2019-10-08 ENCOUNTER — APPOINTMENT (OUTPATIENT)
Dept: CARDIOLOGY | Facility: HOSPITAL | Age: 54
End: 2019-10-08

## 2019-10-25 DIAGNOSIS — I10 ESSENTIAL (PRIMARY) HYPERTENSION: ICD-10-CM

## 2019-10-25 PROBLEM — Z78.9 OTHER SPECIFIED HEALTH STATUS: Chronic | Status: INACTIVE | Noted: 2019-08-10 | Resolved: 2019-09-09

## 2019-10-28 DIAGNOSIS — I21.4 NON-ST ELEVATION (NSTEMI) MYOCARDIAL INFARCTION: ICD-10-CM

## 2019-10-30 ENCOUNTER — OUTPATIENT (OUTPATIENT)
Dept: OUTPATIENT SERVICES | Facility: HOSPITAL | Age: 54
LOS: 1 days | End: 2019-10-30
Payer: SELF-PAY

## 2019-10-30 ENCOUNTER — APPOINTMENT (OUTPATIENT)
Dept: INTERNAL MEDICINE | Facility: CLINIC | Age: 54
End: 2019-10-30
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
## 2019-10-30 DIAGNOSIS — I21.4 NON-ST ELEVATION (NSTEMI) MYOCARDIAL INFARCTION: ICD-10-CM

## 2019-10-30 DIAGNOSIS — I23.6 THROMBOSIS OF ATRIUM, AURICULAR APPENDAGE, AND VENTRICLE AS CURRENT COMPLICATIONS FOLLOWING ACUTE MYOCARDIAL INFARCTION: ICD-10-CM

## 2019-10-30 DIAGNOSIS — Z79.01 LONG TERM (CURRENT) USE OF ANTICOAGULANTS: ICD-10-CM

## 2019-10-30 DIAGNOSIS — I50.1 LEFT VENTRICULAR FAILURE, UNSPECIFIED: ICD-10-CM

## 2019-10-30 DIAGNOSIS — Z95.5 PRESENCE OF CORONARY ANGIOPLASTY IMPLANT AND GRAFT: Chronic | ICD-10-CM

## 2019-10-30 LAB
INR PPP: 1.5 RATIO
QUALITY CONTROL: YES

## 2019-10-30 PROCEDURE — 85610 PROTHROMBIN TIME: CPT

## 2019-10-30 RX ORDER — LISINOPRIL 20 MG/1
20 TABLET ORAL DAILY
Qty: 1 | Refills: 3 | Status: ACTIVE | COMMUNITY
Start: 2019-08-22 | End: 1900-01-01

## 2019-10-30 RX ORDER — WARFARIN 5 MG/1
5 TABLET ORAL
Qty: 60 | Refills: 1 | Status: ACTIVE | COMMUNITY
Start: 2019-09-03 | End: 1900-01-01

## 2019-10-30 RX ORDER — CLOPIDOGREL BISULFATE 75 MG/1
75 TABLET, FILM COATED ORAL DAILY
Qty: 30 | Refills: 3 | Status: ACTIVE | COMMUNITY
Start: 2019-08-22 | End: 1900-01-01

## 2019-10-30 RX ORDER — WARFARIN 2.5 MG/1
2.5 TABLET ORAL
Qty: 22 | Refills: 0 | Status: ACTIVE | COMMUNITY
Start: 2019-08-22 | End: 1900-01-01

## 2019-10-30 RX ORDER — METOPROLOL SUCCINATE 50 MG/1
50 TABLET, EXTENDED RELEASE ORAL DAILY
Qty: 30 | Refills: 3 | Status: ACTIVE | COMMUNITY
Start: 2019-08-22 | End: 1900-01-01

## 2019-10-30 RX ORDER — ATORVASTATIN CALCIUM 80 MG/1
80 TABLET, FILM COATED ORAL
Qty: 30 | Refills: 3 | Status: ACTIVE | COMMUNITY
Start: 2019-08-22 | End: 1900-01-01

## 2019-11-01 ENCOUNTER — OUTPATIENT (OUTPATIENT)
Dept: OUTPATIENT SERVICES | Facility: HOSPITAL | Age: 54
LOS: 1 days | End: 2019-11-01
Payer: SELF-PAY

## 2019-11-01 ENCOUNTER — APPOINTMENT (OUTPATIENT)
Dept: INTERNAL MEDICINE | Facility: CLINIC | Age: 54
End: 2019-11-01
Payer: MEDICAID

## 2019-11-01 VITALS
WEIGHT: 178 LBS | DIASTOLIC BLOOD PRESSURE: 80 MMHG | SYSTOLIC BLOOD PRESSURE: 142 MMHG | HEIGHT: 68 IN | BODY MASS INDEX: 26.98 KG/M2

## 2019-11-01 DIAGNOSIS — I10 ESSENTIAL (PRIMARY) HYPERTENSION: ICD-10-CM
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
## 2019-11-01 DIAGNOSIS — Z95.5 PRESENCE OF CORONARY ANGIOPLASTY IMPLANT AND GRAFT: Chronic | ICD-10-CM

## 2019-11-01 DIAGNOSIS — M10.9 GOUT, UNSPECIFIED: ICD-10-CM

## 2019-11-01 PROCEDURE — 99214 OFFICE O/P EST MOD 30 MIN: CPT | Mod: GC

## 2019-11-01 PROCEDURE — G0463: CPT

## 2019-11-01 RX ORDER — METHYLPREDNISOLONE 4 MG/1
4 TABLET ORAL
Qty: 1 | Refills: 0 | Status: ACTIVE | COMMUNITY
Start: 2019-11-01 | End: 1900-01-01

## 2019-11-01 RX ADMIN — METHYLPREDNISOLONE 0 MG: 4 TABLET ORAL at 00:00

## 2019-11-01 NOTE — HISTORY OF PRESENT ILLNESS
[FreeTextEntry8] : MARCIA Sewell [id number] (Russian)\par \par 54M with recently dx HFrEF (25%), LV thrombus, and NSTEMI s/p RADHA presents to the clinic with foot swelling.\par \par Patient reports pain and swelling began 5 days ago, and it has been hard for him to walk or put on his shoes. He has been using bengay and a tube from [place] ( with diclofenac logo) on it daily. He has never had similar symptoms before. Patient denies any history of gout, arthritis or foot pathology. He states the pain and swelling are a little better than before; and a few days prior, he could not even touch the toe. \par \par Patient denies fevers, chills, or other constitutional symptoms. Has been taking his medications as prescribed. \par

## 2019-11-01 NOTE — PHYSICAL EXAM
[No Acute Distress] : no acute distress [Well Nourished] : well nourished [Normal Sclera/Conjunctiva] : normal sclera/conjunctiva [PERRL] : pupils equal round and reactive to light [Normal Outer Ear/Nose] : the outer ears and nose were normal in appearance [No Respiratory Distress] : no respiratory distress  [No Accessory Muscle Use] : no accessory muscle use [Normal Rate] : normal rate  [Regular Rhythm] : with a regular rhythm [No Varicosities] : no varicosities [No Edema] : there was no peripheral edema [Soft] : abdomen soft [Non Tender] : non-tender [No HSM] : no HSM [Normal Bowel Sounds] : normal bowel sounds [de-identified] : swelling and erythema at tarso-metatarsal joint with pain on dorsiflexion of toe; no allodynia; ROM intact

## 2019-11-01 NOTE — REVIEW OF SYSTEMS
[Fever] : no fever [Chills] : no chills [Chest Pain] : no chest pain [Palpitations] : no palpitations [Shortness Of Breath] : no shortness of breath [Wheezing] : no wheezing [Abdominal Pain] : no abdominal pain [Vomiting] : no vomiting [Joint Stiffness] : no joint stiffness [Muscle Pain] : no muscle pain [Back Pain] : no back pain [Itching] : no itching [Skin Rash] : no skin rash [Headache] : no headache [Memory Loss] : no memory loss [FreeTextEntry9] : foot pain

## 2019-11-01 NOTE — ASSESSMENT
[FreeTextEntry1] : 53 yo male with recenlty dx HFrEF (25%), LV thrombus, and NSTEMI s/p LAD presents to the clinic with foot swelling likely secondary to gout.\par \par 1. Acute gout flare--> podagra\par - now resolving\par - no inciting events per patient; medications reviewed and unlikely side effect\par - given patient on ASA and warfarin, will not prescribe NSAID; patient given medrol dose pack\par - RTC in 5 weeks for CPE, at which time we will test for uric acid (after flare resolved)\par - if no improvement, may need further testing for arthritis \par - xray of right foot to r/o tophi\par \par 2. HCM\par - RTC in 5 weeks for CPE and in 1 week for repeat INR check

## 2019-11-07 ENCOUNTER — APPOINTMENT (OUTPATIENT)
Dept: INTERNAL MEDICINE | Facility: CLINIC | Age: 54
End: 2019-11-07

## 2019-11-07 DIAGNOSIS — M10.9 GOUT, UNSPECIFIED: ICD-10-CM

## 2019-12-06 ENCOUNTER — APPOINTMENT (OUTPATIENT)
Dept: INTERNAL MEDICINE | Facility: CLINIC | Age: 54
End: 2019-12-06

## 2020-02-26 NOTE — ED ADULT NURSE NOTE - CADM POA URETHRAL CATHETER
[facility].   Thank you    [facility]   [address] [place]   [phone]    
No

## 2021-10-06 PROBLEM — I10 ESSENTIAL HYPERTENSION: Status: ACTIVE | Noted: 2019-08-22

## 2022-09-26 NOTE — PROGRESS NOTE ADULT - PROBLEM SELECTOR PROBLEM 1
Rx Refill Note  Requested Prescriptions      No prescriptions requested or ordered in this encounter      Last office visit with prescribing clinician: 7/15/2022      Next office visit with prescribing clinician: 11/7/2022            Joy Felton LPN  09/26/22, 12:50 EDT   Nonsustained ventricular tachycardia

## 2023-01-01 NOTE — H&P ADULT - NSHPPHYSICALEXAM_GEN_ALL_CORE
No PHYSICAL EXAM:  T(C): 36.5 (08-10-19 @ 00:32), Max: 37 (08-10-19 @ 00:04)  HR: 89 (08-10-19 @ 01:45) (85 - 94)  BP: 155/117 (08-10-19 @ 01:45) (154/104 - 155/117)  RR: 18 (08-10-19 @ 01:45) (18 - 20)  SpO2: 96% (08-10-19 @ 01:45) (96% - 98%)    GENERAL: NAD, speaks in full sentences, no signs of respiratory distress  HEAD:  Atraumatic, Normocephalic  EYES: EOMI, PERRLA, conjunctiva and sclera clear  NECK: Supple, No JVD  CHEST/LUNG: Clear to auscultation bilaterally; No wheeze; No crackles; No accessory muscles used  HEART: Regular rate and rhythm; No murmurs;   ABDOMEN: Soft, Nontender, Nondistended; Bowel sounds present; No guarding  EXTREMITIES:  2+ Peripheral Pulses, No cyanosis or edema  PSYCH: AAOx3  NEUROLOGY: non-focal  SKIN: pruritic rash __ PHYSICAL EXAM:  T(C): 36.5 (08-10-19 @ 00:32), Max: 37 (08-10-19 @ 00:04)  HR: 89 (08-10-19 @ 01:45) (85 - 94)  BP: 155/117 (08-10-19 @ 01:45) (154/104 - 155/117)  RR: 18 (08-10-19 @ 01:45) (18 - 20)  SpO2: 96% (08-10-19 @ 01:45) (96% - 98%)    GENERAL: middle aged male laying flat in bed in NAD, speaks in full sentences, no signs of respiratory distress  HEAD:  Atraumatic, Normocephalic  EYES: EOMI, PERRLA, conjunctiva and sclera clear  NECK: Supple, No JVD  CHEST/LUNG: Clear to auscultation bilaterally; No wheeze; No crackles; No accessory muscles used  HEART: Regular rate and rhythm; No murmurs;   ABDOMEN: Soft, Nontender, Nondistended; Bowel sounds present; No guarding  EXTREMITIES:  2+ Peripheral Pulses, No cyanosis or edema  PSYCH: AAOx3  NEUROLOGY: non-focal  SKIN: dark erythematous rash on b/l forearms with large vesicles and some weeping on posterior surface. also has small vesicles between digits. also has pinpoint erythema on anterior chest near RUQ and on b/l shins in a linear distribution extending to ankles

## 2023-07-27 NOTE — H&P ADULT - ASSESSMENT
53 yo M with PMH HTN, HF, NSTEMI, LV thrombus sent in from resident clinic p/w CP Epidural 55 yo [place] M with PMH recent NSTEMI s/p mLAD stent, HFrEF with EF 25%, LV thrombus on coumadin p/w chest pain admitted to eval for ACS.

## 2023-08-04 NOTE — PHARMACOTHERAPY INTERVENTION NOTE - COMMENTS
education given to patient via  phone
confirmed with [facility] - cost for 12 medications sent is $116 - informed med np
defer to cardiology for management  -iso ischemic CM w/ severe MR: s/p PCI to RCA w/ residual CFx,  ASA, Lipitor, metoprolol tartrate BID   EF 35%

## 2024-08-13 NOTE — ED ADULT NURSE NOTE - CAS EDP DISCH DISPOSITION ADMI
Please see pt response below  Pt confirms he was fasting for 12+ hours and had not drank any alcohol the week of labs  Pt concerned about adding an additional medication  Also asking you to address his testosterone level   Telemetry

## 2025-02-20 NOTE — PATIENT PROFILE ADULT - NSPROGENARRIVEDFROM_GEN_A_NUR
Patient called and the  answered the  phone as voicemail. Message ;left with the office phone number to call later if needed. I will try to reach out later  
emergency department